# Patient Record
Sex: FEMALE | Race: WHITE | NOT HISPANIC OR LATINO | Employment: UNEMPLOYED | URBAN - METROPOLITAN AREA
[De-identification: names, ages, dates, MRNs, and addresses within clinical notes are randomized per-mention and may not be internally consistent; named-entity substitution may affect disease eponyms.]

---

## 2017-01-05 ENCOUNTER — TRANSFERRED RECORDS (OUTPATIENT)
Dept: HEALTH INFORMATION MANAGEMENT | Facility: CLINIC | Age: 32
End: 2017-01-05

## 2017-01-06 ENCOUNTER — TRANSFERRED RECORDS (OUTPATIENT)
Dept: HEALTH INFORMATION MANAGEMENT | Facility: CLINIC | Age: 32
End: 2017-01-06

## 2017-01-06 ENCOUNTER — APPOINTMENT (OUTPATIENT)
Dept: CT IMAGING | Facility: CLINIC | Age: 32
End: 2017-01-06
Attending: EMERGENCY MEDICINE
Payer: COMMERCIAL

## 2017-01-06 ENCOUNTER — HOSPITAL ENCOUNTER (OUTPATIENT)
Facility: CLINIC | Age: 32
Setting detail: OBSERVATION
Discharge: HOME | End: 2017-01-07
Attending: EMERGENCY MEDICINE | Admitting: INTERNAL MEDICINE
Payer: COMMERCIAL

## 2017-01-06 DIAGNOSIS — R10.12 ABDOMINAL PAIN, LEFT UPPER QUADRANT: Primary | ICD-10-CM

## 2017-01-06 DIAGNOSIS — R11.2 INTRACTABLE VOMITING WITH NAUSEA, UNSPECIFIED VOMITING TYPE: ICD-10-CM

## 2017-01-06 LAB
ALBUMIN SERPL-MCNC: 4 G/DL (ref 3.4–5)
ALP SERPL-CCNC: 46 U/L (ref 40–150)
ALT SERPL W P-5'-P-CCNC: 23 U/L (ref 0–50)
ANION GAP SERPL CALCULATED.3IONS-SCNC: 10 MMOL/L (ref 3–14)
AST SERPL W P-5'-P-CCNC: 20 U/L (ref 0–45)
BASOPHILS # BLD AUTO: 0.1 10E9/L (ref 0–0.2)
BASOPHILS NFR BLD AUTO: 0.4 %
BILIRUB DIRECT SERPL-MCNC: <0.1 MG/DL (ref 0–0.2)
BILIRUB SERPL-MCNC: 0.3 MG/DL (ref 0.2–1.3)
BUN SERPL-MCNC: 10 MG/DL (ref 7–30)
CALCIUM SERPL-MCNC: 8.8 MG/DL (ref 8.5–10.1)
CHLORIDE SERPL-SCNC: 109 MMOL/L (ref 94–109)
CO2 SERPL-SCNC: 23 MMOL/L (ref 20–32)
CREAT SERPL-MCNC: 0.75 MG/DL (ref 0.52–1.04)
DIFFERENTIAL METHOD BLD: ABNORMAL
EOSINOPHIL # BLD AUTO: 0 10E9/L (ref 0–0.7)
EOSINOPHIL NFR BLD AUTO: 0.3 %
ERYTHROCYTE [DISTWIDTH] IN BLOOD BY AUTOMATED COUNT: 12.4 % (ref 10–15)
GFR SERPL CREATININE-BSD FRML MDRD: ABNORMAL ML/MIN/1.7M2
GLUCOSE SERPL-MCNC: 196 MG/DL (ref 70–99)
HCG SERPL QL: NEGATIVE
HCT VFR BLD AUTO: 40.3 % (ref 35–47)
HGB BLD-MCNC: 13.9 G/DL (ref 11.7–15.7)
IMM GRANULOCYTES # BLD: 0 10E9/L (ref 0–0.4)
IMM GRANULOCYTES NFR BLD: 0.2 %
LIPASE SERPL-CCNC: 105 U/L (ref 73–393)
LYMPHOCYTES # BLD AUTO: 1.6 10E9/L (ref 0.8–5.3)
LYMPHOCYTES NFR BLD AUTO: 12.2 %
MCH RBC QN AUTO: 30.9 PG (ref 26.5–33)
MCHC RBC AUTO-ENTMCNC: 34.5 G/DL (ref 31.5–36.5)
MCV RBC AUTO: 90 FL (ref 78–100)
MONOCYTES # BLD AUTO: 0.7 10E9/L (ref 0–1.3)
MONOCYTES NFR BLD AUTO: 5.1 %
NEUTROPHILS # BLD AUTO: 10.7 10E9/L (ref 1.6–8.3)
NEUTROPHILS NFR BLD AUTO: 81.8 %
NRBC # BLD AUTO: 0 10*3/UL
NRBC BLD AUTO-RTO: 0 /100
PLATELET # BLD AUTO: 182 10E9/L (ref 150–450)
POTASSIUM SERPL-SCNC: 3.8 MMOL/L (ref 3.4–5.3)
PROT SERPL-MCNC: 7.2 G/DL (ref 6.8–8.8)
RBC # BLD AUTO: 4.5 10E12/L (ref 3.8–5.2)
SODIUM SERPL-SCNC: 142 MMOL/L (ref 133–144)
WBC # BLD AUTO: 13.1 10E9/L (ref 4–11)

## 2017-01-06 PROCEDURE — 25000128 H RX IP 250 OP 636: Performed by: INTERNAL MEDICINE

## 2017-01-06 PROCEDURE — 96361 HYDRATE IV INFUSION ADD-ON: CPT

## 2017-01-06 PROCEDURE — 99285 EMERGENCY DEPT VISIT HI MDM: CPT | Mod: 25

## 2017-01-06 PROCEDURE — 25500064 ZZH RX 255 OP 636: Performed by: EMERGENCY MEDICINE

## 2017-01-06 PROCEDURE — 25800025 ZZH RX 258: Performed by: EMERGENCY MEDICINE

## 2017-01-06 PROCEDURE — 96365 THER/PROPH/DIAG IV INF INIT: CPT | Mod: 59

## 2017-01-06 PROCEDURE — 80048 BASIC METABOLIC PNL TOTAL CA: CPT | Performed by: EMERGENCY MEDICINE

## 2017-01-06 PROCEDURE — 85025 COMPLETE CBC W/AUTO DIFF WBC: CPT | Performed by: EMERGENCY MEDICINE

## 2017-01-06 PROCEDURE — 99220 ZZC INITIAL OBSERVATION CARE,LEVL III: CPT | Performed by: INTERNAL MEDICINE

## 2017-01-06 PROCEDURE — 25000125 ZZHC RX 250

## 2017-01-06 PROCEDURE — 25000125 ZZHC RX 250: Performed by: EMERGENCY MEDICINE

## 2017-01-06 PROCEDURE — G0378 HOSPITAL OBSERVATION PER HR: HCPCS

## 2017-01-06 PROCEDURE — 83690 ASSAY OF LIPASE: CPT | Performed by: EMERGENCY MEDICINE

## 2017-01-06 PROCEDURE — 96375 TX/PRO/DX INJ NEW DRUG ADDON: CPT

## 2017-01-06 PROCEDURE — 84703 CHORIONIC GONADOTROPIN ASSAY: CPT | Performed by: INTERNAL MEDICINE

## 2017-01-06 PROCEDURE — 71260 CT THORAX DX C+: CPT

## 2017-01-06 PROCEDURE — 96376 TX/PRO/DX INJ SAME DRUG ADON: CPT

## 2017-01-06 PROCEDURE — 96366 THER/PROPH/DIAG IV INF ADDON: CPT

## 2017-01-06 PROCEDURE — 74177 CT ABD & PELVIS W/CONTRAST: CPT

## 2017-01-06 PROCEDURE — 25000125 ZZHC RX 250: Performed by: INTERNAL MEDICINE

## 2017-01-06 PROCEDURE — 80076 HEPATIC FUNCTION PANEL: CPT | Performed by: EMERGENCY MEDICINE

## 2017-01-06 RX ORDER — SODIUM CHLORIDE 9 MG/ML
INJECTION, SOLUTION INTRAVENOUS CONTINUOUS
Status: DISCONTINUED | OUTPATIENT
Start: 2017-01-06 | End: 2017-01-06

## 2017-01-06 RX ORDER — DEXTROSE MONOHYDRATE, SODIUM CHLORIDE, AND POTASSIUM CHLORIDE 50; 1.49; 4.5 G/1000ML; G/1000ML; G/1000ML
INJECTION, SOLUTION INTRAVENOUS ONCE
Status: COMPLETED | OUTPATIENT
Start: 2017-01-06 | End: 2017-01-07

## 2017-01-06 RX ORDER — METOCLOPRAMIDE HYDROCHLORIDE 5 MG/ML
5 INJECTION INTRAMUSCULAR; INTRAVENOUS ONCE
Status: COMPLETED | OUTPATIENT
Start: 2017-01-06 | End: 2017-01-06

## 2017-01-06 RX ORDER — ONDANSETRON 4 MG/1
4 TABLET, ORALLY DISINTEGRATING ORAL EVERY 6 HOURS PRN
Status: DISCONTINUED | OUTPATIENT
Start: 2017-01-06 | End: 2017-01-07 | Stop reason: HOSPADM

## 2017-01-06 RX ORDER — ACETAMINOPHEN 325 MG/1
650 TABLET ORAL EVERY 4 HOURS PRN
Status: DISCONTINUED | OUTPATIENT
Start: 2017-01-06 | End: 2017-01-07 | Stop reason: HOSPADM

## 2017-01-06 RX ORDER — LORAZEPAM 2 MG/ML
INJECTION INTRAMUSCULAR
Status: COMPLETED
Start: 2017-01-06 | End: 2017-01-06

## 2017-01-06 RX ORDER — ALUMINA, MAGNESIA, AND SIMETHICONE 2400; 2400; 240 MG/30ML; MG/30ML; MG/30ML
30 SUSPENSION ORAL ONCE
Status: DISCONTINUED | OUTPATIENT
Start: 2017-01-06 | End: 2017-01-07 | Stop reason: HOSPADM

## 2017-01-06 RX ORDER — PROCHLORPERAZINE MALEATE 5 MG
5-10 TABLET ORAL EVERY 6 HOURS PRN
Status: DISCONTINUED | OUTPATIENT
Start: 2017-01-06 | End: 2017-01-07 | Stop reason: HOSPADM

## 2017-01-06 RX ORDER — NALOXONE HYDROCHLORIDE 0.4 MG/ML
.1-.4 INJECTION, SOLUTION INTRAMUSCULAR; INTRAVENOUS; SUBCUTANEOUS
Status: DISCONTINUED | OUTPATIENT
Start: 2017-01-06 | End: 2017-01-07 | Stop reason: HOSPADM

## 2017-01-06 RX ORDER — ONDANSETRON 2 MG/ML
4 INJECTION INTRAMUSCULAR; INTRAVENOUS EVERY 6 HOURS PRN
Status: DISCONTINUED | OUTPATIENT
Start: 2017-01-06 | End: 2017-01-07 | Stop reason: HOSPADM

## 2017-01-06 RX ORDER — PROCHLORPERAZINE 25 MG
25 SUPPOSITORY, RECTAL RECTAL EVERY 12 HOURS PRN
Status: DISCONTINUED | OUTPATIENT
Start: 2017-01-06 | End: 2017-01-07 | Stop reason: HOSPADM

## 2017-01-06 RX ORDER — HYDROMORPHONE HYDROCHLORIDE 1 MG/ML
0.2 INJECTION, SOLUTION INTRAMUSCULAR; INTRAVENOUS; SUBCUTANEOUS
Status: DISCONTINUED | OUTPATIENT
Start: 2017-01-06 | End: 2017-01-07 | Stop reason: HOSPADM

## 2017-01-06 RX ORDER — IOPAMIDOL 755 MG/ML
76 INJECTION, SOLUTION INTRAVASCULAR ONCE
Status: COMPLETED | OUTPATIENT
Start: 2017-01-06 | End: 2017-01-06

## 2017-01-06 RX ADMIN — METOCLOPRAMIDE HYDROCHLORIDE 5 MG: 5 INJECTION INTRAMUSCULAR; INTRAVENOUS at 14:06

## 2017-01-06 RX ADMIN — LORAZEPAM 0.5 MG: 2 INJECTION INTRAMUSCULAR; INTRAVENOUS at 10:41

## 2017-01-06 RX ADMIN — POTASSIUM CHLORIDE, DEXTROSE MONOHYDRATE AND SODIUM CHLORIDE: 150; 5; 450 INJECTION, SOLUTION INTRAVENOUS at 14:12

## 2017-01-06 RX ADMIN — METOCLOPRAMIDE 5 MG: 5 INJECTION, SOLUTION INTRAMUSCULAR; INTRAVENOUS at 11:37

## 2017-01-06 RX ADMIN — ONDANSETRON HYDROCHLORIDE 4 MG: 2 SOLUTION INTRAMUSCULAR; INTRAVENOUS at 17:06

## 2017-01-06 RX ADMIN — PANTOPRAZOLE SODIUM 40 MG: 40 INJECTION, POWDER, FOR SOLUTION INTRAVENOUS at 20:32

## 2017-01-06 RX ADMIN — IOPAMIDOL 76 ML: 755 INJECTION, SOLUTION INTRAVENOUS at 12:35

## 2017-01-06 RX ADMIN — SODIUM CHLORIDE 86 ML: 9 INJECTION, SOLUTION INTRAVENOUS at 12:35

## 2017-01-06 RX ADMIN — SODIUM CHLORIDE 100 ML: 9 INJECTION, SOLUTION INTRAVENOUS at 17:06

## 2017-01-06 RX ADMIN — DEXTROSE AND SODIUM CHLORIDE: 5; 900 INJECTION, SOLUTION INTRAVENOUS at 10:50

## 2017-01-06 RX ADMIN — PROCHLORPERAZINE EDISYLATE 10 MG: 5 INJECTION INTRAMUSCULAR; INTRAVENOUS at 10:41

## 2017-01-06 ASSESSMENT — ENCOUNTER SYMPTOMS
ABDOMINAL PAIN: 1
DIARRHEA: 0
VOMITING: 1
SHORTNESS OF BREATH: 0
NAUSEA: 1
CHILLS: 0
FEVER: 0

## 2017-01-06 NOTE — ED NOTES
"[facility]  ED Nurse Handoff Report    ED Chief complaint: Abdominal Pain      ED Diagnosis:   Final diagnoses:   Intractable vomiting with nausea, unspecified vomiting type       Code Status: Full Code    Allergies:   Allergies   Allergen Reactions     Ceclor [Cefaclor] Hives       Activity level:  Independent     Needed?: No    Isolation: No  Infection: Not Applicable    Bariatric?: No      Vital Signs:   Filed Vitals:    01/06/17 0937   BP: 140/89   Temp: 98.5  F (36.9  C)   TempSrc: Temporal   Resp: 28   Height: 1.6 m (5' 3\")   Weight: 69.4 kg (153 lb)   SpO2: 99%       Cardiac Rhythm: ,        Pain level: 0-10 Pain Scale: 4    Is this patient confused?: No    Patient Report: Initial Complaint: n/v. abd pain  Focused Assessment: pt arrives via private car. Pt crying c/o lower abd pain and intermittent vomiting and retching. Pt had just come from having an upper gi with biopsy for abd pain. Pt had zofran 8 mgs prior to arrival here. pt treated here with compazine,reglan x 2 and iv fluids.Tests Performed:blood. ctTreatments provided: as stated normal ct scan. Glucose 196. Wbc 13.1  Family Comments:dad and son here    OBS brochure/video discussed/provided to patient: N/A    ED Medications:   Medications   dextrose 5% and 0.9% NaCl infusion ( Intravenous Stopped 1/6/17 1400)   alum & mag hydroxide-simethicone (MYLANTA ES/MAALOX  ES) suspension 30 mL (not administered)   prochlorperazine (COMPAZINE) 5 MG/ML injection (10 mg  Given 1/6/17 1041)   LORazepam (ATIVAN) 2 MG/ML injection (0.5 mg  Given 1/6/17 1041)   metoclopramide (REGLAN) injection 5 mg (5 mg Intravenous Given 1/6/17 1137)   iopamidol (ISOVUE-370) solution 76 mL (76 mLs Intravenous Given 1/6/17 1235)   sodium chloride 0.9 % for CT scan flush dose 86 mL (86 mLs Intravenous Given 1/6/17 1235)   metoclopramide (REGLAN) injection 5 mg (5 mg Intravenous Given 1/6/17 1406)   dextrose 5% and 0.45% NaCl + KCl 20 mEq/L infusion ( " Intravenous New Bag 1/6/17 1412)       Drips infusing?:  No      ED NURSE PHONE NUMBER: [phone]

## 2017-01-06 NOTE — CONSULTS
GASTROINTESTINAL CONSULTATION      PRIMARY CARE PHYSICIAN:  Susan Haase APRN, CNP      REASON FOR CONSULTATION:  Postprocedural abdominal pain, nausea and vomiting. .      HISTORY OF PRESENT ILLNESS:  Earlier today, Jonelle Barba underwent upper GI endoscopy at our [facility] her chronic abdominal pain.  The patient reports for the past year or so she has had left upper quadrant pain on an intermittent basis.  Nothing triggers the pain and nothing makes it better.  She has it about twice a month.      EGD today showed 1 focal area of esophagitis.  This was biopsied.  Gastric biopsies were taken for H. pylori.  There is a note that the stomach was shaped possible and paraesophageal hernia was present.  Duodenum was normal.      As soon as patient woke up, she began feeling nauseous and had nausea and vomiting.  Initially threw up bile with small red flecks in it.  She also had abdominal pain that would not resolve despite burping the air.  She presented to [facility] Emergency Room for evaluation.  Upon presentation to the emergency room, she had a CT chest, abdomen and pelvis.  CT scan was unremarkable.  Chest x-ray was normal.  Blood tests were only remarkable for a mild elevation in her white count and her glucose.      She continues to have some nausea and vomiting.  She has not vomited any more blood.  She continues to have diffuse abdominal pain.  She also had some diarrhea since presenting to the emergency room.      PAST MEDICAL HISTORY:  The patient reports having pneumonia last week.   History of  and cystoscopy.      FAMILY HISTORY:  No family history of colon cancer or colon polyps at an early age.      SOCIAL HISTORY:  The patient does use tobacco.      REVIEW OF SYSTEMS:  A 10-point review of systems was done and other than those mentioned was positive for dizziness.      PHYSICAL EXAMINATION:   VITAL SIGNS:  Temperature 98.5, pulse 92, respiratory rate 28, BP is 127/80, pulse  ox 98% on room air.   GENERAL:  The patient is awake, alert and oriented in no acute distress.   HEENT:  Normocephalic, atraumatic.  Pupils equal, round, reactive to light.  Extraocular eye muscles intact.  Sclerae anicteric.  Oropharynx is clear.  Mucous membranes are moist.   NECK:  Supple without lymphadenopathy.  There is no thyromegaly.   CHEST:  Clear to auscultation bilaterally and nontender to palpation.   ABDOMEN:  Soft, mildly diffusely tender without rebound or guarding.  Bowel sounds are normal.   EXTREMITIES:  Warm, without lower extremity edema.   NEUROLOGIC:  Cranial nerves II-XII grossly intact.   PSYCHIATRIC:  Reveals no obvious anxiety or depression.      ASSESSMENT:  A 31-year-old female with history of post-procedural nausea, vomiting, and abdominal pain.  CT has not shown any worrisome pathology.  Suspect that perhaps air insufflation of her abdomen this triggered her nausea and vomiting.  The blood that she initially vomited were likely due to the biopsies.      PLAN:   1.  Start PPIs.   2.  Start clear liquids and advance as tolerated.   3.  Expect the patient will be able to be discharged tomorrow.      Thank you for allowing me to participate in the care of this patient.  Please contact me with any questions or concerns.         IRON MILLER MD             D: 2017 15:19   T: 2017 17:51   MT: CHERRY      Name:     STARLA LÓPEZ   MRN:      [id number]        Account:       [id number]   :      1985           Consult Date:  2017      Document: [id number]       cc: Susan Haase APRN, CNP

## 2017-01-06 NOTE — ED AVS SNAPSHOT
MRN:[id number]                      After Visit Summary   1/6/2017    Jonelle Barba    MRN: [id number]           Thank you!     Thank you for choosing [facility] for your care. Our goal is always to provide you with excellent care. Hearing back from our patients is one way we can continue to improve our services. Please take a few minutes to complete the written survey that you may receive in the mail after you visit with us. Thank you!        Patient Information     Date Of Birth          1985        About your hospital stay     You were admitted on:  January 6, 2017 [facility]    You were discharged on:  January 7, 2017        Reason for your hospital stay       You were admitted due to severe nausea and vomiting after an upper GI procedure.  You were treated with supportive care and acid reducing medication with improvement.  You are encouraged to trial Omeprazole (Prilosec) for two weeks and monitor symptoms.  Followup as previously indicated with GI.                  Who to Call     For medical emergencies, please call 911.  For non-urgent questions about your medical care, please call your primary care provider or clinic, [phone]          Attending Provider     Provider    Lalo Rain MD Bhattarai, Nimesh, MD       Primary Care Provider Office Phone # Fax #    Susan Rachele Haase, APRN [phone]       [address] [address]        After Care Instructions     Activity       Your activity upon discharge: activity as tolerated            Diet       Follow this diet upon discharge:   Regular Diet Adult                  Follow-up Appointments     Follow-up and recommended labs and tests        Follow up with primary care provider, Susan Haase, within 1-2 weeks, to evaluate medication change and for hospital follow- up. No follow up labs or test are needed.    Follow  "up with Gastroenterology as previously recommended.                  Pending Results     No orders found for last 2 day(s).            Statement of Approval     Ordered          17 1058  I have reviewed and agree with all the recommendations and orders detailed in this document.   EFFECTIVE NOW     Approved and electronically signed by:  Angel Paredes PA-C             Admission Information        Provider Department Dept Phone    2017 Manolo Marley MD [facility] [phone]      Your Vitals Were     Blood Pressure Temperature Respirations    106/60 mmHg 98.9  F (37.2  C) (Oral) 16    Height Weight BMI (Body Mass Index)    1.6 m (5' 3\") 69.4 kg (153 lb) 27.11 kg/m2    Pulse Oximetry          96%        MyChart Information     YouScan lets you send messages to your doctor, view your test results, renew your prescriptions, schedule appointments and more. To sign up, go to [email or web address] . Click on \"Log in\" on the left side of the screen, which will take you to the Welcome page. Then click on \"Sign up Now\" on the right side of the page.     You will be asked to enter the access code listed below, as well as some personal information. Please follow the directions to create your username and password.     Your access code is: [email or web address]  Expires: 3/26/2017 11:52 AM     Your access code will  in 90 days. If you need help or a new code, please call your [facility] clinic or [phone].        Care EveryWhere ID     This is your Care EveryWhere ID. This could be used by other organizations to access your [facility] medical records  [id number]           Review of your medicines      START taking        Dose / Directions    omeprazole 20 MG CR capsule   Commonly known as:  priLOSEC   Used for:  Intractable vomiting with nausea, unspecified vomiting type        Dose:  20 mg   Take 1 capsule (20 mg) by mouth daily   Quantity:  90 capsule   Refills:  0         CONTINUE these medicines which have " NOT CHANGED        Dose / Directions    * albuterol 108 (90 BASE) MCG/ACT Inhaler   Commonly known as:  PROAIR HFA/PROVENTIL HFA/VENTOLIN HFA   Used for:  Mild intermittent asthma without complication        Dose:  2 puff   Inhale 2 puffs into the lungs every 6 hours as needed for shortness of breath / dyspnea or wheezing   Quantity:  1 Inhaler   Refills:  0       * albuterol (2.5 MG/3ML) 0.083% neb solution   Used for:  Cough        Dose:  1 vial   Take 1 vial (2.5 mg) by nebulization every 6 hours as needed for shortness of breath / dyspnea or wheezing   Quantity:  75 mL   Refills:  0       nicotine polacrilex 2 MG gum   Commonly known as:  NICORELIEF   Used for:  Cigarette nicotine dependence without complication        Dose:  2 mg   Place 1 each (2 mg) inside cheek as needed for smoking cessation   Quantity:  90 tablet   Refills:  1       sertraline 100 MG tablet   Commonly known as:  ZOLOFT   Used for:  Anxiety   Notes to Patient:  Resume taking as you do at home        Dose:  100 mg   Take 1 tablet (100 mg) by mouth daily   Quantity:  30 tablet   Refills:  1       * Notice:  This list has 2 medication(s) that are the same as other medications prescribed for you. Read the directions carefully, and ask your doctor or other care provider to review them with you.         Where to get your medicines      Some of these will need a paper prescription and others can be bought over the counter. Ask your nurse if you have questions.     You don't need a prescription for these medications    - omeprazole 20 MG CR capsule             Protect others around you: Learn how to safely use, store and throw away your medicines at www.disposemymeds.org.             Medication List: This is a list of all your medications and when to take them. Check marks below indicate your daily home schedule. Keep this list as a reference.      Medications           Morning Afternoon Evening Bedtime As Needed    * albuterol 108 (90 BASE)  MCG/ACT Inhaler   Commonly known as:  PROAIR HFA/PROVENTIL HFA/VENTOLIN HFA   Inhale 2 puffs into the lungs every 6 hours as needed for shortness of breath / dyspnea or wheezing                                   * albuterol (2.5 MG/3ML) 0.083% neb solution   Take 1 vial (2.5 mg) by nebulization every 6 hours as needed for shortness of breath / dyspnea or wheezing                                   nicotine polacrilex 2 MG gum   Commonly known as:  NICORELIEF   Place 1 each (2 mg) inside cheek as needed for smoking cessation                                   omeprazole 20 MG CR capsule   Commonly known as:  priLOSEC   Take 1 capsule (20 mg) by mouth daily                                sertraline 100 MG tablet   Commonly known as:  ZOLOFT   Take 1 tablet (100 mg) by mouth daily   Notes to Patient:  Resume taking as you do at home                                * Notice:  This list has 2 medication(s) that are the same as other medications prescribed for you. Read the directions carefully, and ask your doctor or other care provider to review them with you.

## 2017-01-06 NOTE — ED AVS SNAPSHOT
[facility] Observation Unit    [address] [address]    Phone:  [phone]                                       Jonelle Barba   MRN: [id number]    Department:  [facility]   Date of Visit:  1/6/2017           After Visit Summary Signature Page     I have received my discharge instructions, and my questions have been answered. I have discussed any challenges I see with this plan with the nurse or doctor.    ..........................................................................................................................................  Patient/Patient Representative Signature      ..........................................................................................................................................  Patient Representative Print Name and Relationship to Patient    ..................................................               ................................................  Date                                            Time    ..........................................................................................................................................  Reviewed by Signature/Title    ...................................................              ..............................................  Date                                                            Time

## 2017-01-06 NOTE — PHARMACY-ADMISSION MEDICATION HISTORY
Admission medication history interview status for the 1/6/2017  admission is complete. See EPIC admission navigator for prior to admission medications     Medication history source reliability:Good    Actions taken by pharmacist (provider contacted, etc):None     Additional medication history information not noted on PTA med list :None    Medication reconciliation/reorder completed by provider prior to medication history? No    Time spent in this activity: 10 minutes    Prior to Admission medications    Medication Sig Last Dose Taking? Auth Provider   albuterol (2.5 MG/3ML) 0.083% neb solution Take 1 vial (2.5 mg) by nebulization every 6 hours as needed for shortness of breath / dyspnea or wheezing prn Yes Aaseby-Aguilera, Ramona Ann, PA-C   nicotine polacrilex (NICORELIEF) 2 MG gum Place 1 each (2 mg) inside cheek as needed for smoking cessation prn Yes Haase, Susan Rachele, APRN CNP   sertraline (ZOLOFT) 100 MG tablet Take 1 tablet (100 mg) by mouth daily 1/5/2017 at evening Yes Haase, Susan Rachele, APRN CNP   albuterol (PROAIR HFA, PROVENTIL HFA, VENTOLIN HFA) 108 (90 BASE) MCG/ACT inhaler Inhale 2 puffs into the lungs every 6 hours as needed for shortness of breath / dyspnea or wheezing prn Yes Haase, Susan Rachele, APRN CNP Tiffany M. Reinitz, PharmD

## 2017-01-06 NOTE — ED PROVIDER NOTES
History     Chief Complaint:  Abdominal Pain    HPI   Jonelle Barba is a 31 year old female who presents immediately after an endoscopy with an acute onset of suprapubic abdominal pain with associated nausea and vomiting. Per the patient's discharge note, today the patient had an endoscopy performed for evaluation of non improving left upper quadrant abdominal she had prior to surgery. During the procedure, she received 200 mg of propofol IV, 8 mg Zofran and 500 ccs of saline. Findings of the endoscopy showed a possible paraesophageal hernia and mild esophagitis. Biopsy of the stomach and distal esophagus was obtained. During the procedure, the patient had a lot of retching and coughing. Following the procedure the patient vomited purple flecks and Dr. Jason, of [facility] who preformed the procedure, became concerned about a possible perforation and that the patient may have a small jennifer wise tear. The patient was subsequently advised to come to the emergency department for further evaluation. The patient does not report any fevers, chills, chest pain, shortness of breath, diarrhea, or other related symptoms. She voices no other concerns at this time.     Allergies:  Ceclor      Medications:    Albuterol   Zithromax   Nicrorelief   Zoloft   Proair       Past Medical History:    Anxiety  Mild major depression  Bipolar 2 disorder     Past Surgical History:    Chalazion removal    section   Cholecystectomy     Family History:    Heart disease (paternal grandfather)  Cancer (maternal grandmother)  Diabetes, substance abuse (paternal aunt)   GI disease (father)    Social History:  The patient smokes lightly. The patient does not use alcohol.   Marital Status:  Single     Review of Systems   Constitutional: Negative for fever and chills.   Respiratory: Negative for shortness of breath.    Cardiovascular: Negative for chest pain.   Gastrointestinal: Positive for nausea, vomiting and abdominal pain.  "Negative for diarrhea.   All other systems reviewed and are negative.      Physical Exam     Patient Vitals for the past 24 hrs:   BP Temp Temp src Heart Rate Resp SpO2 Height Weight   01/06/17 0937 140/89 mmHg 98.5  F (36.9  C) Temporal 92 28 99 % 1.6 m (5' 3\") 69.4 kg (153 lb)      Physical Exam  General: Resting comfortably on the gurney  Head:  The scalp, face, and head appear normal  Eyes:  The pupils are equal, round, and reactive to light    There is no nystagmus    Extraocular muscles are intact    Conjunctivae and sclerae are normal  ENT:    The nose is normal    Pinnae are normal    The oropharynx is normal    Uvula is in the midline  Neck:  Normal range of motion    There is no rigidity noted    There is no midline cervical spine pain/tenderness    Trachea is in the midline    No mass is detected  CV:  Regular rate and underlying rhythm     Normal S1/S2, no S3/S4    No pathological murmur detected  Resp:  Lungs are clear    There is no tachypnea    Non-labored    No rales    No wheezing   GI:  Abdomen is soft, there is no rigidity    No distension    No tympani    No rebound tenderness     Non-surgical without peritoneal features  MS:  Normal muscular tone    Symmetric motor strength    No major joint effusions    No asymmetric leg swelling, no calf tenderness  Skin:  No rash or acute skin lesions noted  Neuro: Speech is normal and fluent  Psych:  Awake. Alert.      Normal affect.  Appropriate interactions.  Lymph: No anterior cervical lymphadenopathy noted    Emergency Department Course     Imaging:  CT Chest/Abdomen/Pelvis w contrast: 1. No evidence for pulmonary embolus. 2. Prominent hilar lymph nodes bilaterally. 3. No evidence for bowel perforation in the chest, abdomen, or pelvis. 4. Fluid in the distal small bowel which can be seen with gastroenteritis. 5. Probable right ovarian cyst. Readings per radiology.     Laboratory:  CBC: WNL (HGB 13.9, ), WBC 13.1 (H), otherwise within normal limits "   BMP: WNL (Creatinine 0.75), glucose 196 (H)  Hepatic Function Panel: all within normal limits   Lipase: 105 (WNL)    Interventions:  1134 Reglan 5 mg IV  1044 Dextrose 1/2 NS  1041 Ativan 2 mg/mL IV (o.5 mg IV)  1038 Compazine 5 mg/mL (10 mg IV)  Reglan 5 mg IV      Emergency Department Course:  Past medical records, nursing notes, and vitals reviewed. I performed an exam of the patient and obtained history, as documented above. Blood was obtained. A peripheral IV was established.     1400: on re-evaluation the patient has ongoing retching, nausea, and vomiting. She continues to not feel well despite numerous antiemetics     Findings and plan explained to the Patient who consents to admission. Discussed the patient with Dr. Alex, who will admit the patient to a obs bed for further monitoring, evaluation, and treatment.    Impression & Plan      Medical Decision Making:  Jonelle Barba is a 31 year old female who presents with sweating, nausea and intractable vomiting after upper endoscopy. The patient was sent over for CT scan imaging to exclude perforation and no perforation is seen. The patient has been in the emergency department for approximately four hours and has ongoing retching, nausea, and vomiting. She continues to have some mild diaphoresis as well. No life threatening pathology is seen on CT imaging. She has been given IV hydration, Zofran, compazine, Reglan, Lorazepam and continues to feel ill. She will be admitted for observation, hydration, and addition antiemetics. This appears to be all post procedural related. Interestingly, the only sedative she was given was Propofol which does have some intrinsic antiemetic effect. The patient will be placed on observation with Dr. Alex with [facility]     Diagnosis:    ICD-10-CM    1. Intractable vomiting with nausea, unspecified vomiting type post EGD R11.2         Disposition:  Placed on obs with Dr. Alex with [facility]     I, Maddie Glover, am serving as a scribe at 10:18 AM on 1/6/2017 to document services personally performed by Lalo Rain MD based on my observations and the provider's statements to me.            Lalo Rain MD  01/06/17 1734

## 2017-01-07 VITALS
HEIGHT: 63 IN | RESPIRATION RATE: 16 BRPM | TEMPERATURE: 98.9 F | OXYGEN SATURATION: 96 % | BODY MASS INDEX: 27.11 KG/M2 | DIASTOLIC BLOOD PRESSURE: 60 MMHG | WEIGHT: 153 LBS | SYSTOLIC BLOOD PRESSURE: 106 MMHG

## 2017-01-07 LAB
ANION GAP SERPL CALCULATED.3IONS-SCNC: 8 MMOL/L (ref 3–14)
BUN SERPL-MCNC: 7 MG/DL (ref 7–30)
CALCIUM SERPL-MCNC: 8.6 MG/DL (ref 8.5–10.1)
CHLORIDE SERPL-SCNC: 110 MMOL/L (ref 94–109)
CO2 SERPL-SCNC: 24 MMOL/L (ref 20–32)
CREAT SERPL-MCNC: 0.59 MG/DL (ref 0.52–1.04)
ERYTHROCYTE [DISTWIDTH] IN BLOOD BY AUTOMATED COUNT: 12.9 % (ref 10–15)
GFR SERPL CREATININE-BSD FRML MDRD: ABNORMAL ML/MIN/1.7M2
GLUCOSE SERPL-MCNC: 89 MG/DL (ref 70–99)
HCT VFR BLD AUTO: 38.4 % (ref 35–47)
HGB BLD-MCNC: 12.9 G/DL (ref 11.7–15.7)
MCH RBC QN AUTO: 30.5 PG (ref 26.5–33)
MCHC RBC AUTO-ENTMCNC: 33.6 G/DL (ref 31.5–36.5)
MCV RBC AUTO: 91 FL (ref 78–100)
PLATELET # BLD AUTO: 175 10E9/L (ref 150–450)
POTASSIUM SERPL-SCNC: 3.5 MMOL/L (ref 3.4–5.3)
RBC # BLD AUTO: 4.23 10E12/L (ref 3.8–5.2)
SODIUM SERPL-SCNC: 142 MMOL/L (ref 133–144)
WBC # BLD AUTO: 9.7 10E9/L (ref 4–11)

## 2017-01-07 PROCEDURE — 96361 HYDRATE IV INFUSION ADD-ON: CPT

## 2017-01-07 PROCEDURE — 25000125 ZZHC RX 250: Performed by: INTERNAL MEDICINE

## 2017-01-07 PROCEDURE — 80048 BASIC METABOLIC PNL TOTAL CA: CPT | Performed by: INTERNAL MEDICINE

## 2017-01-07 PROCEDURE — G0378 HOSPITAL OBSERVATION PER HR: HCPCS

## 2017-01-07 PROCEDURE — 96376 TX/PRO/DX INJ SAME DRUG ADON: CPT

## 2017-01-07 PROCEDURE — 36415 COLL VENOUS BLD VENIPUNCTURE: CPT | Performed by: INTERNAL MEDICINE

## 2017-01-07 PROCEDURE — 85027 COMPLETE CBC AUTOMATED: CPT | Performed by: INTERNAL MEDICINE

## 2017-01-07 PROCEDURE — 99217 ZZC OBSERVATION CARE DISCHARGE: CPT | Performed by: PHYSICIAN ASSISTANT

## 2017-01-07 RX ADMIN — PANTOPRAZOLE SODIUM 40 MG: 40 INJECTION, POWDER, FOR SOLUTION INTRAVENOUS at 09:35

## 2017-01-07 NOTE — PLAN OF CARE
Problem: Goal Outcome Summary  Goal: Goal Outcome Summary  Outcome: Improving  pT DENIES NAUSEA. NO VOMITING SINCE ADMISSION TO FLOOR. NO DIARRHEA WANTING TO DRINK FLUIDS GIVEN.

## 2017-01-07 NOTE — PROGRESS NOTES
Goals to be met before discharge    1. Improve n/v- Partially met    2. Able to tolerate PO- Partially met

## 2017-01-07 NOTE — PROGRESS NOTES
Goals to be met before discharge    1. Improve n/v-partially met. Denies currently  2. able to tolerate PO-partially met. Tolerating clears

## 2017-01-07 NOTE — PLAN OF CARE
Problem: Discharge Planning  Goal: Discharge Planning (Adult, OB, Behavioral, Peds)  Outcome: Adequate for Discharge Date Met:  01/07/17  A&Ox4. Up independently. Tolerating regular diet, denies nausea. VSS on RA. Infrequent dry cough and throat irritation from EGD yesterday. Denies pain. Mother and son at bedside.    Pt given discharge instructions. Questions answered. Discharge medications reviewed with patient- to be picked up OTC. Follow up appointment to be scheduled by pt. Pt to DC home with mother.

## 2017-01-07 NOTE — DISCHARGE SUMMARY
[facility]    Discharge Summary  Hospitalist    Date of Admission:  1/6/2017  Date of Discharge:  1/7/2017  Discharging Provider: Angel Paredes PA-C  Date of Service (when I saw the patient): 01/07/2017    Discharge Diagnoses  Intractable vomiting with nausea, unspecified vomiting type    History of Present Illness  Jonelle Barba is an 31 year old female who presented with intractable nausea, vomiting and abdominal pain following an outpatient EGD performed same-day.      For complete details of admission, refer to H&P performed by Dr. Marley.    Hospital Course  Jonelle Barba was admitted on 1/6/2017.  She underwent outpatient EGD at [facility] due to ongoing abdominal pain.  EGD with findings of paraesophageal hernia and mild esophagitis.  Immediately following procedure, patient with intractable retching, coughing, and severe abdominal pain.  She was referred to the Emergency Department for evaluation, where a CT Chest/Abdomen/Pelvis was obtained and negative for perforation or other acute finding.  She was admitted under observation, provided IV pain control and antiemetics with subsequent improvement.  She was able to advance to a regular diet and discharged home with recommendations to start a PPI daily until follow-up in clinic with GI as previously indicated.  She was discharged home in stable condition with adequate pain control using tylenol and tolerating regular diet.    Angel Paredes PA-C    Significant Results and Procedures  As noted below    Pending Results  These results will be followed up by n/a   Unresulted Labs Ordered in the Past 30 Days of this Admission     No orders found for last 60 day(s).          Code Status  Full Code       Primary Care Physician  Susan Haase    Physical Exam  Temp: 98.9  F (37.2  C) Temp src: Oral BP: 106/60 mmHg   Heart Rate: 77 Resp: 16 SpO2: 96 % O2 Device: None (Room air)    Filed Vitals:    01/06/17 0937   Weight: 69.4 kg (153 lb)      Vital Signs with Ranges  Temp:  [98.5  F (36.9  C)-98.9  F (37.2  C)] 98.9  F (37.2  C)  Heart Rate:  [70-82] 77  Resp:  [16] 16  BP: (106-152)/(58-92) 106/60 mmHg  SpO2:  [96 %-99 %] 96 %  I/O last 3 completed shifts:  In: 1590 [P.O.:120; I.V.:1470]  Out: 300 [Urine:300]    GEN: well-developed, well-nourished, appears comfortable  PULM: lungs CTA bilaterally, no increased work of breathing, no wheeze, rales, rhonchi  CV: RRR, S1 & S2, no murmur  GI: soft, nontender, nondistended, no guarding or rigidity, +BS in all 4 quadrants  SKIN: warm & dry without rash, wound, or pedal edema, no bruising or bleeding    Discharge Disposition  Discharged to home  Condition at discharge: Stable    Consultations This Hospital Stay  GASTROENTEROLOGY IP CONSULT    Time Spent on This Encounter  IAngel, personally saw the patient today and spent less than or equal to 30 minutes discharging this patient.    Discharge Orders    Reason for your hospital stay   You were admitted due to severe nausea and vomiting after an upper GI procedure.  You were treated with supportive care and acid reducing medication with improvement.  You are encouraged to trial Omeprazole (Prilosec) for two weeks and monitor symptoms.  Followup as previously indicated with GI.     Follow-up and recommended labs and tests    Follow up with primary care provider, Susan Haase, within 1-2 weeks, to evaluate medication change and for hospital follow- up. No follow up labs or test are needed.    Follow up with Gastroenterology as previously recommended.     Activity   Your activity upon discharge: activity as tolerated     Diet   Follow this diet upon discharge:   Regular Diet Adult       Discharge Medications  Current Discharge Medication List      START taking these medications    Details   omeprazole (PRILOSEC) 20 MG CR capsule Take 1 capsule (20 mg) by mouth daily  Qty: 90 capsule, Refills: 0    Associated Diagnoses: Intractable vomiting with nausea,  unspecified vomiting type         CONTINUE these medications which have NOT CHANGED    Details   albuterol (2.5 MG/3ML) 0.083% neb solution Take 1 vial (2.5 mg) by nebulization every 6 hours as needed for shortness of breath / dyspnea or wheezing  Qty: 75 mL, Refills: 0    Associated Diagnoses: Cough      nicotine polacrilex (NICORELIEF) 2 MG gum Place 1 each (2 mg) inside cheek as needed for smoking cessation  Qty: 90 tablet, Refills: 1    Associated Diagnoses: Cigarette nicotine dependence without complication      sertraline (ZOLOFT) 100 MG tablet Take 1 tablet (100 mg) by mouth daily  Qty: 30 tablet, Refills: 1    Associated Diagnoses: Anxiety      albuterol (PROAIR HFA, PROVENTIL HFA, VENTOLIN HFA) 108 (90 BASE) MCG/ACT inhaler Inhale 2 puffs into the lungs every 6 hours as needed for shortness of breath / dyspnea or wheezing  Qty: 1 Inhaler, Refills: 0    Comments: Patient will call when needed.  Associated Diagnoses: Mild intermittent asthma without complication           Allergies  Allergies   Allergen Reactions     Ceclor [Cefaclor] Hives     Data  Most Recent 3 CBC's:  Recent Labs   Lab Test  01/07/17   0620  01/06/17   1100  04/27/16   0840   WBC  9.7  13.1*  6.6   HGB  12.9  13.9  13.9   MCV  91  90  93   PLT  175  182  143*      Most Recent 3 BMP's:  Recent Labs   Lab Test  01/07/17   0620  01/06/17   1100  11/11/15   1626   NA  142  142  137   POTASSIUM  3.5  3.8  4.0   CHLORIDE  110*  109  105   CO2  24  23  26   BUN  7  10  9   CR  0.59  0.75  0.72   ANIONGAP  8  10  6   AASHISH  8.6  8.8  9.5   GLC  89  196*  92     Most Recent 2 LFT's:  Recent Labs   Lab Test  01/06/17   1100  09/14/15   1310   AST  20  19   ALT  23  28   ALKPHOS  46  51   BILITOTAL  0.3  0.3     Most Recent INR's and Anticoagulation Dosing History:        Anticoagulation Dose History     There is no flowsheet data to display.        Most Recent 3 Troponin's:  Recent Labs   Lab Test  09/14/15   1310   TROPI  <0.015  The 99th percentile  for upper reference range is 0.045 ug/L.  Troponin values in   the range of 0.045 - 0.120 ug/L may be associated with risks of adverse   clinical events.       Most Recent Cholesterol Panel:  Recent Labs   Lab Test  07/20/10   1022   CHOL  127   LDL  73   HDL  34*   TRIG  100     Most Recent 6 Bacteria Isolates From Any Culture (See EPIC Reports for Culture Details):  Recent Labs   Lab Test  08/18/16   1811  04/27/16   0839  10/22/15   1545  07/06/15   1443  08/07/13   1655  03/30/10   1910   CULT  No Beta Streptococcus isolated  No Beta Streptococcus isolated  No Beta Streptococcus isolated  No Beta Streptococcus isolated  No growth  No growth after 6 days     Most Recent TSH, T4 and A1c Labs:  Recent Labs   Lab Test  03/20/15   1535   TSH  2.55     Results for orders placed or performed during the hospital encounter of 01/06/17   CT Chest/Abdomen/Pelvis w Contrast    Narrative    CT CHEST/ABDOMEN/PELVIS WITH CONTRAST 1/6/2017 12:50 PM    HISTORY: Post endoscopy. Bleeding. Intractable vomiting. Rule out  perforation.    TECHNIQUE: Scans were obtained from the lung apices through the pelvis  with IV contrast. Radiation dose for this scan was reduced using  automated exposure control, adjustment of the mA and/or kV according  to patient size, or iterative reconstruction technique.    CONTRAST GIVEN: 76mL Isovue-370.    COMPARISON: None.    FINDINGS:  Chest: Pulmonary arteries are well-opacified and appear patent without  evidence of pulmonary embolus. Thoracic aorta is normal without  evidence of aneurysm or dissection. Mild gaseous distention of the  esophagus. No extraluminal air or hemorrhage identified in the  posterior mediastinum. Several mildly prominent lymph nodes identified  in both pete slightly more marked on the right. No definite  mediastinal adenopathy. Lungs are clear.    Abdomen and pelvis: Liver, spleen, pancreas, and both kidneys are  normal. Previous cholecystectomy. Moderate amount of fluid and  air in  the stomach. No evidence for pneumoperitoneum. 2.8 x 2.5 cm cyst in  the right adnexa which most likely represents an ovarian cyst. Fluid  in the distal small bowel which can be seen with gastroenteritis.  Colon and small bowel are otherwise unremarkable without evidence of  obstruction. Appendix is normal. No abdominal or pelvic adenopathy.  Remainder of the scan is negative.      Impression    IMPRESSION:   1. No evidence for pulmonary embolus.  2. Prominent hilar lymph nodes bilaterally.  3. No evidence for bowel perforation in the chest, abdomen, or pelvis.  4. Fluid in the distal small bowel which can be seen with  gastroenteritis.  5. Probable right ovarian cyst.    RONNELL GARNER MD

## 2017-01-07 NOTE — PROGRESS NOTES
ASSISTED TO BED. ORIENTED TO ROOM. STATES  PAIN TOLERABLE, BUT HAVING NAUSEA.  ZOFRAN GIVEN WITH RELIEF.

## 2017-01-07 NOTE — PLAN OF CARE
Problem: Goal Outcome Summary  Goal: Goal Outcome Summary  Outcome: Improving  A&Ox4. VSS on RA. Up independently. C/O 2/10 abdominal pain that she has had since admission. No emesis or diarrhea this shift. Tolerating clear liquid diets, will attempt to advance further this AM. GI consult done.

## 2017-01-07 NOTE — PROGRESS NOTES
"[facility]  Gastroenterology Progress note      Assessment        abdominal pain after EGD improved this am.  Discomfort back to mild LUQ.  CT scan of chest abd pelvis negative for PE or perforation. Physical exam benign abdomen      Plan     discharge home      Interval History     improved overnight      Physical Exam    /60 mmHg  Temp(Src) 98.9  F (37.2  C) (Oral)  Resp 16  Ht 1.6 m (5' 3\")  Wt 69.4 kg (153 lb)  BMI 27.11 kg/m2  SpO2 96%  Temp (24hrs), Av.7  F (37.1  C), Min:98.5  F (36.9  C), Max:98.9  F (37.2  C)    Patient Vitals for the past 72 hrs:   Weight   17 0937 69.4 kg (153 lb)       Intake/Output Summary (Last 24 hours) at 17 0912  Last data filed at 17 0428   Gross per 24 hour   Intake   1590 ml   Output    300 ml   Net   1290 ml         Constitutional: NAD, comfortable  Cardiovascular: RRR, normal S1, S2   Respiratory: CTAB  Abdomen: soft, non-tender, nondistended,  BS+      Additional Comments     ROS, FH, SH: See initial GI consult for details.    Lab Data     Recent Labs   Lab Test  17   0620  17   1100  16   0840   WBC  9.7  13.1*  6.6   HGB  12.9  13.9  13.9   MCV  91  90  93   PLT  175  182  143*     Recent Labs   Lab Test  17   0620  17   1100  11/11/15   1626   NA  142  142  137   POTASSIUM  3.5  3.8  4.0   CHLORIDE  110*  109  105   CO2  24  23  26   BUN  7  10  9   CR  0.59  0.75  0.72   ANIONGAP  8  10  6   AASHISH  8.6  8.8  9.5     Recent Labs   Lab Test  17   1100  16   1718  12/23/15   1422  11/11/15   1618   09/14/15   1310   03/20/15   1535   13   2254   ALBUMIN  4.0   --    --    --    --   4.1   --   4.3   --   5.1   BILITOTAL  0.3   --    --    --    --   0.3   --   0.4   --   0.7   ALT  23   --    --    --    --   28   --   22   < >  38   AST  20   --    --    --    --   19   --   15   < >  46*   ALKPHOS  46   --    --    --    --   51   --   66   --   58   PROTEIN   --   " Negative  Negative  Negative   < >   --    < >   --    < >   --    LIPASE  105   --    --    --    --   227   --    --    --   42    < > = values in this interval not displayed.               MONA Ruiz MD  [facility]  Office: [phone] call if needed after 5PM or on weekends  Cell: [phone], not available after 5PM at this number

## 2017-01-07 NOTE — H&P
PRIMARY CARE PROVIDER:  Susan Haase       DATE OF ADMISSION:  01/06/2017      CHIEF COMPLAINT:  Intractable nausea and vomiting, abdominal pain.      HISTORY OF PRESENT ILLNESS:  Ms. Jonelle Barba is a 31-year-old female with history of mild intermittent asthma, anxiety and depression and ongoing abdominal pain who presents to the emergency room after she had sudden-onset abdominal pain and retching after upper GI endoscopy today.  The patient reportedly has been having on-and-off abdominal pain for the past year and eventually she had an endoscopy done today.  It was done by Dr. Jason of [facility], and she was found to have paraesophageal hernia with mild esophagitis and biopsy of the stomach was done.  Right after the procedure, she started having intractable retching and coughing and severe abdominal pain.  She also had vomiting of purplish flecks and clear emesis and there was concern about either perforation or Lubna-Tse tear and so she was sent to the emergency room.  The patient, after coming to the emergency room, had a CT done, which was negative for perforation.  Abdominal pain was slightly better, but she continued to have ongoing retching, was not able to tolerate even clear liquids and is being admitted for symptom control.  On questioning further, she denies any other ongoing symptoms.  No chest pain, shortness of breath or palpitations.  No dysuria, urinary urgency or frequency.  No fever or chills.  Her urine output has been slightly low because she has not had much to eat or drink.  Last menstrual period was 2 weeks ago.      PAST MEDICAL HISTORY:    1.  Depression and anxiety.   2.  Mild intermittent asthma.      ALLERGIES:  Ceclor.      SOCIAL AND PERSONAL HISTORY:  She is single.  She works at [email or web address]; she does  work.  Was smoking about 1 pack a week until today.  Drinks alcohol socially.  No recreational drug use.      FAMILY HISTORY:  Paternal grandfather with heart disease  and maternal grandmother with cancer, otherwise unremarkable.      HOME MEDICATIONS:   1.  Albuterol inhaler as needed.   2.  Zoloft 100 mg daily.      REVIEW OF SYSTEMS:  A complete review of system was done and was negative for anything else other than that mentioned in the HPI.      PHYSICAL EXAMINATION:   GENERAL:  A 31-year-old female.  She is in mild distress because of ongoing nausea and retching.   VITAL SIGNS:  Temperature 98.5, blood pressure 127/80, pulse 92, respiration rate 28, O2 sats 98% on room air.   HEENT:  Atraumatic, normocephalic.  No pallor or icterus.   NECK:  Supple with good range of motion.   RESPIRATORY:  Good air entry bilaterally with normal effort of breathing.  No crackles or wheeze.   CARDIOVASCULAR:  Regular rate and rhythm.     GASTROINTESTINAL:  She has got nonspecific tenderness throughout her abdomen.  There is no guarding, rebound or rigidity.  Bowel sound is normoactive.   EXTREMITIES:  No edema.   SKIN:  Warm and dry.   NEUROLOGIC:  Awake, alert, oriented.  Cranial nerves II-XII are intact.      LABORATORY AND DIAGNOSTIC DATA:  Electrolytes are okay with normal lipase and LFTs.  Blood glucose was 196.  White cell count is 13.1.      IMAGING:  CT chest, abdomen and pelvis is negative for bowel perforation.      ASSESSMENT AND PLAN:  Ms. Jonelle Barba is a 31-year-old female with history of on-and-off abdominal pain for a year who underwent EGD today and had intractable retching and abdominal pain post procedure.   1.  Intractable nausea and vomiting, unclear as far as the etiology of this.  Procedure just revealed paraesophageal hernia with mild esophagitis.  Unsure if this is an intolerant reaction to the endoscopy probe.  At this time she continues to have ongoing nausea, vomiting and retching.  She will be admitted for symptom control.  I will put her on Zofran and Compazine.  Will also place her on IV Protonix b.i.d.  GI will be consulted.  CT abdomen does not show any  perforation.  She did have small flecks of purplish vomiting, so will watch her closely for any clinical evidence of [place]-Tse tear.  I will keep her n.p.o. for now.   2.  Depression and anxiety.  Will resume her Zoloft when she starts taking p.o.      Anticipated length of stay less than 2 midnights.      CODE STATUS:  Full code.         PATO MENA MD             D: 2017 15:08   T: 2017 18:50   MT: BAMBI      Name:     STARLA LÓPEZ   MRN:      [id number]        Account:      [id number]   :      1985           Admitted:     [id number]      Document: [id number]

## 2017-01-09 ENCOUNTER — TELEPHONE (OUTPATIENT)
Dept: FAMILY MEDICINE | Facility: CLINIC | Age: 32
End: 2017-01-09

## 2017-01-09 NOTE — TELEPHONE ENCOUNTER
"Hospital/TCU/ED for chronic condition Discharge Protocol    \"Hi, my name is Madalyn NEWBY, a registered nurse, and I am calling from [facility].  I am calling to follow up and see how things are going for you after your recent emergency visit/hospital/TCU stay.\"    Tell me how you are doing now that you are home?\" good home      Discharge Instructions    \"Let's review your discharge instructions.  What is/are the follow-up recommendations?  Pt. Response: see PCP    \"Has an appointment with your primary care provider been scheduled?\"   No (schedule appointment)    \"When you see the provider, I would recommend that you bring your medications with you.\"    Medications    \"Tell me what changed about your medicines when you discharged?\"    Changes to chronic meds?    none    \"What questions do you have about your medications?\"    None     New diagnoses of heart failure, COPD, diabetes, or MI?    No              Medication reconciliation completed? Yes  Was MTM referral placed (*Make sure to put transitions as reason for referral)?   No    Call Summary    \"What questions or concerns do you have about your recent visit and your follow-up care?\"     none    \"If you have questions or things don't continue to improve, we encourage you contact us through the main clinic number (give number).  Even if the clinic is not open, triage nurses are available 24/7 to help you.     We would like you to know that our clinic has extended hours (provide information).  We also have urgent care (provide details on closest location and hours/contact info)\"      \"Thank you for your time and take care!\"  Madalyn López RN, BSN  Message handled by Nurse Triage.               "

## 2017-01-09 NOTE — TELEPHONE ENCOUNTER
ED / Discharge Outreach Protocol    Patient Contact    Attempt # 1    Was call answered?  No.  Left message on voicemail with information to call me back.  Madalyn López RN, BSN  Message handled by Nurse Triage.

## 2017-01-09 NOTE — TELEPHONE ENCOUNTER
Chief Complaint   Patient presents with     Hospital F/U     Inpatient discharge from [facility] on 1/7/2017 Abdominal Pain, Left Upper Quadrant, Intractable Vomiting With Nausea, Unspecified Vomiting Type       Kristal Araujo/

## 2017-01-11 ENCOUNTER — OFFICE VISIT (OUTPATIENT)
Dept: FAMILY MEDICINE | Facility: CLINIC | Age: 32
End: 2017-01-11
Payer: COMMERCIAL

## 2017-01-11 VITALS
WEIGHT: 148 LBS | OXYGEN SATURATION: 98 % | RESPIRATION RATE: 14 BRPM | SYSTOLIC BLOOD PRESSURE: 124 MMHG | BODY MASS INDEX: 26.22 KG/M2 | TEMPERATURE: 98 F | DIASTOLIC BLOOD PRESSURE: 86 MMHG | HEART RATE: 84 BPM | HEIGHT: 63 IN

## 2017-01-11 DIAGNOSIS — R11.0 NAUSEA: ICD-10-CM

## 2017-01-11 DIAGNOSIS — F41.9 ANXIETY: ICD-10-CM

## 2017-01-11 DIAGNOSIS — R10.32 ABDOMINAL PAIN, LEFT LOWER QUADRANT: Primary | ICD-10-CM

## 2017-01-11 PROCEDURE — 99214 OFFICE O/P EST MOD 30 MIN: CPT | Performed by: NURSE PRACTITIONER

## 2017-01-11 RX ORDER — ONDANSETRON 4 MG/1
4 TABLET, FILM COATED ORAL EVERY 8 HOURS PRN
Qty: 12 TABLET | Refills: 0 | Status: ON HOLD | OUTPATIENT
Start: 2017-01-11 | End: 2018-12-22

## 2017-01-11 RX ORDER — HYDROXYZINE HYDROCHLORIDE 25 MG/1
25-50 TABLET, FILM COATED ORAL EVERY 6 HOURS PRN
Qty: 60 TABLET | Refills: 1 | Status: SHIPPED | OUTPATIENT
Start: 2017-01-11 | End: 2017-05-16

## 2017-01-11 NOTE — MR AVS SNAPSHOT
"              After Visit Summary   2017    Jonelle Barba    MRN: [id number]           Patient Information     Date Of Birth          1985        Visit Information        Provider Department      2017 2:30 PM Haase, Susan Rachele, APRN CNP [facility]        Today's Diagnoses     Anxiety    -  1     Nausea            Follow-ups after your visit        Follow-up notes from your care team     Return in about 2 weeks (around 2017).      Who to contact     If you have questions or need follow up information about today's clinic visit or your schedule please contact [facility] directly at [phone].  Normal or non-critical lab and imaging results will be communicated to you by MyChart, letter or phone within 4 business days after the clinic has received the results. If you do not hear from us within 7 days, please contact the clinic through MyChart or phone. If you have a critical or abnormal lab result, we will notify you by phone as soon as possible.  Submit refill requests through [employer] or call your pharmacy and they will forward the refill request to us. Please allow 3 business days for your refill to be completed.          Additional Information About Your Visit        MyChart Information     [employer] lets you send messages to your doctor, view your test results, renew your prescriptions, schedule appointments and more. To sign up, go to [email or web address] . Click on \"Log in\" on the left side of the screen, which will take you to the Welcome page. Then click on \"Sign up Now\" on the right side of the page.     You will be asked to enter the access code listed below, as well as some personal information. Please follow the directions to create your username and password.     Your access code is: [email or web address]  Expires: 3/26/2017 11:52 AM     Your access code will  in 90 days. If you need help or a new code, please call your [facility] or " "[phone].        Care EveryWhere ID     This is your Care EveryWhere ID. This could be used by other organizations to access your [facility] medical records  [id number]        Your Vitals Were     Pulse Temperature Respirations Height BMI (Body Mass Index) Pulse Oximetry    84 98  F (36.7  C) (Oral) 14 5' 3\" (1.6 m) 26.22 kg/m2 98%       Blood Pressure from Last 3 Encounters:   01/11/17 124/86   01/07/17 106/60   12/26/16 137/83    Weight from Last 3 Encounters:   01/11/17 148 lb (67.132 kg)   01/06/17 153 lb (69.4 kg)   12/26/16 151 lb 0.6 oz (68.511 kg)              We Performed the Following     Asthma Action Plan (AAP)          Today's Medication Changes          These changes are accurate as of: 1/11/17  3:07 PM.  If you have any questions, ask your nurse or doctor.               Start taking these medicines.        Dose/Directions    hydrOXYzine 25 MG tablet   Commonly known as:  ATARAX   Used for:  Anxiety   Started by:  Haase, Susan Rachele, APRN CNP        Dose:  25-50 mg   Take 1-2 tablets (25-50 mg) by mouth every 6 hours as needed for anxiety   Quantity:  60 tablet   Refills:  1       ondansetron 4 MG tablet   Commonly known as:  ZOFRAN   Used for:  Nausea   Started by:  Haase, Susan Rachele, APRN CNP        Dose:  4 mg   Take 1 tablet (4 mg) by mouth every 8 hours as needed for nausea   Quantity:  12 tablet   Refills:  0            Where to get your medicines      These medications were sent to [facility] [address] [address]     Phone:  [phone]    - hydrOXYzine 25 MG tablet  - ondansetron 4 MG tablet             Primary Care Provider Office Phone # Fax #    Susan Rachele Haase, APRN [phone]       [address] [address]        Thank you!     Thank you for choosing [facility]  for your care. Our goal is always to provide you with " excellent care. Hearing back from our patients is one way we can continue to improve our services. Please take a few minutes to complete the written survey that you may receive in the mail after your visit with us. Thank you!             Your Updated Medication List - Protect others around you: Learn how to safely use, store and throw away your medicines at www.disposemymeds.org.          This list is accurate as of: 1/11/17  3:07 PM.  Always use your most recent med list.                   Brand Name Dispense Instructions for use    * albuterol 108 (90 BASE) MCG/ACT Inhaler    PROAIR HFA/PROVENTIL HFA/VENTOLIN HFA    1 Inhaler    Inhale 2 puffs into the lungs every 6 hours as needed for shortness of breath / dyspnea or wheezing       * albuterol (2.5 MG/3ML) 0.083% neb solution     75 mL    Take 1 vial (2.5 mg) by nebulization every 6 hours as needed for shortness of breath / dyspnea or wheezing       hydrOXYzine 25 MG tablet    ATARAX    60 tablet    Take 1-2 tablets (25-50 mg) by mouth every 6 hours as needed for anxiety       nicotine polacrilex 2 MG gum    NICORELIEF    90 tablet    Place 1 each (2 mg) inside cheek as needed for smoking cessation       omeprazole 20 MG CR capsule    priLOSEC    90 capsule    Take 1 capsule (20 mg) by mouth daily       ondansetron 4 MG tablet    ZOFRAN    12 tablet    Take 1 tablet (4 mg) by mouth every 8 hours as needed for nausea       sertraline 100 MG tablet    ZOLOFT    30 tablet    Take 1 tablet (100 mg) by mouth daily       * Notice:  This list has 2 medication(s) that are the same as other medications prescribed for you. Read the directions carefully, and ask your doctor or other care provider to review them with you.

## 2017-01-11 NOTE — PROGRESS NOTES
SUBJECTIVE:                                                    Jonelle Barba is a 31 year old female who presents to clinic today for the following health issues:    Hospital Follow-up Visit:  Hospital/Nursing Home/IP Rehab Facility: [facility]  Date of Admission: 1/6/2017  Date of Discharge: 1/7/2017  Reason(s) for Admission: abdominal pain            Problems taking medications regularly:  None       Medication changes since discharge: None       Problems adhering to non-medication therapy:  None    Summary of hospitalization:  [facility] discharge summary reviewed  Diagnostic Tests/Treatments reviewed.  Follow up needed: [facility] and barium swallow, scheduled for 1/12/2017.    Other Healthcare Providers Involved in Patient s Care: [facility]       Update since discharge: over the past year has had abdominal pain, gradually increasing.  Was seen by [facility] and an endoscopy was ordered for further evaluation of the pain.  On 1/16 had the endoscopy completed, after the procedure developed increased abdominal pain with nausea and vomiting  was admitted overnight.  Since discharge on 1/17 has continues to have abdominal pain that is located in the LUQ and LLQ.  Nauseated, tolerating sips of fluid and crackers.  Denies vomiting, fever.  Last BM was this morning and was diarrhea.  Taking omeprazole on daily basis as prescribed.   Anxiety:  Feels that anxiety has been increased over the past 2 weeks, unknown reason since work, home life has been stable.  Taking sertraline 100 mg every day.  Denies thoughts of harming self or others.     Post Discharge Medication Reconciliation: discharge medications reconciled, continue medications without change.  Plan of care communicated with patient     Coding guidelines for this visit:  Type of Medical   Decision Making Face-to-Face Visit       within 7 Days of discharge Face-to-Face Visit        within 14 days of discharge   Moderate Complexity [id number]   High  Complexity [id number]        Problem list and histories reviewed & adjusted, as indicated.  Additional history: as documented    Patient Active Problem List   Diagnosis     Allergic rhinitis     Health Care Home     ACP (advance care planning)     Bipolar 2 disorder (H)     Migraine without aura and without status migrainosus, not intractable     Mild intermittent asthma without complication     Intractable nausea and vomiting     Past Surgical History   Procedure Laterality Date     C nonspecific procedure  age 10      chalazion removal under general       section  12/10/2013     Procedure:  SECTION;   Section for failure to progress. ;  Surgeon: Monique Can MD;  Location: [facility] L+D     Laparoscopic cholecystectomy N/A 2015     Procedure: LAPAROSCOPIC CHOLECYSTECTOMY;  Surgeon: Yuliet Hutton MD;  Location: [facility] OR       Social History   Substance Use Topics     Smoking status: Light Tobacco Smoker     Types: Cigarettes     Smokeless tobacco: Never Used      Comment: occasional     Alcohol Use: No     Family History   Problem Relation Age of Onset     HEART DISEASE Paternal Grandfather      MI in his 60's      Breast Cancer Maternal Grandmother       M great grandmother  in her 60's      Depression Maternal Grandmother      Family History Negative Mother      DIABETES Paternal Aunt      Dx in her mid to late 30's (sounds like DM2)     Depression Paternal Aunt      Anxiety Disorder Paternal Aunt      Substance Abuse Paternal Aunt      CANCER Maternal Grandfather      skin CA      Asthma Father      GASTROINTESTINAL DISEASE Father      s/p maninder. Also Paunt and Mgf s/p maninder.      Asthma Brother      Asthma Sister      Depression Sister      Anxiety Disorder Sister      C.A.D. Paternal Grandmother      Stent placed age 63      Depression Paternal Grandmother      Depression Paternal Uncle      Substance Abuse Paternal Uncle      Anxiety Disorder Cousin      Gallbladder  "Disease Mother      Gallbladder Disease Maternal Grandfather      Coronary Artery Disease Maternal Grandmother          Current Outpatient Prescriptions   Medication Sig Dispense Refill     omeprazole (PRILOSEC) 20 MG CR capsule Take 1 capsule (20 mg) by mouth daily 90 capsule 0     albuterol (2.5 MG/3ML) 0.083% neb solution Take 1 vial (2.5 mg) by nebulization every 6 hours as needed for shortness of breath / dyspnea or wheezing 75 mL 0     nicotine polacrilex (NICORELIEF) 2 MG gum Place 1 each (2 mg) inside cheek as needed for smoking cessation 90 tablet 1     sertraline (ZOLOFT) 100 MG tablet Take 1 tablet (100 mg) by mouth daily 30 tablet 1     albuterol (PROAIR HFA, PROVENTIL HFA, VENTOLIN HFA) 108 (90 BASE) MCG/ACT inhaler Inhale 2 puffs into the lungs every 6 hours as needed for shortness of breath / dyspnea or wheezing 1 Inhaler 0     Allergies   Allergen Reactions     Ceclor [Cefaclor] Hives       ROS:  C: NEGATIVE for fever, chills, change in weight  E/M: NEGATIVE for ear, mouth and throat problems  R: NEGATIVE for significant cough or SOB  CV: NEGATIVE for chest pain, palpitations or peripheral edema  GI: see HPI  PSYCH:  See HPI  OBJECTIVE:                                                    /86 mmHg  Pulse 84  Temp(Src) 98  F (36.7  C) (Oral)  Resp 14  Ht 5' 3\" (1.6 m)  Wt 148 lb (67.132 kg)  BMI 26.22 kg/m2  SpO2 98%  Body mass index is 26.22 kg/(m^2).   GENERAL: healthy, alert and no distress  NECK: no adenopathy, no asymmetry, masses, or scars and thyroid normal to palpation  RESP: lungs clear to auscultation - no rales, rhonchi or wheezes  CV: regular rate and rhythm, normal S1 S2, no S3 or S4, no murmur, click or rub, no peripheral edema  ABDOMEN: soft, LUQ and LLQ tenderness, no hepatosplenomegaly, no masses and bowel sounds normal     ASSESSMENT:                                                    See below    PLAN:                                                    Jonelle was seen " today for hospital f/u.    Diagnoses and all orders for this visit:    Abdominal pain, left lower quadrant:  Encouraged taking frequent sips of water, bland foods, Continue on omeprazole 20 mg every day 30 min prior to breakfast,  GI follow up tomorrow.   -     ondansetron (ZOFRAN) 4 MG tablet; Take 1 tablet (4 mg) by mouth every 8 hours as needed for nausea    Nausea:  Sips of fluid and bland foods.  -     ondansetron (ZOFRAN) 4 MG tablet; Take 1 tablet (4 mg) by mouth every 8 hours as needed for nausea    Anxiety: continue on sertraline 100 mg every day, will also prescribe hydroxyzine to take for prn increased anxiety  -     hydrOXYzine (ATARAX) 25 MG tablet; Take 1-2 tablets (25-50 mg) by mouth every 6 hours as needed for anxiety  FUTURE APPOINTMENTS:       - Follow-up visit in 2 weeks, sooner as needed.    Susan Haase, APRN [facility]

## 2017-01-11 NOTE — NURSING NOTE
"Chief Complaint   Patient presents with     Hospital F/U       Initial /86 mmHg  Pulse 84  Temp(Src) 98  F (36.7  C) (Oral)  Resp 14  Ht 5' 3\" (1.6 m)  Wt 148 lb (67.132 kg)  BMI 26.22 kg/m2  SpO2 98% Estimated body mass index is 26.22 kg/(m^2) as calculated from the following:    Height as of this encounter: 5' 3\" (1.6 m).    Weight as of this encounter: 148 lb (67.132 kg).  BP completed using cuff size: lien Quinn CMA      "

## 2017-01-12 ENCOUNTER — TRANSFERRED RECORDS (OUTPATIENT)
Dept: HEALTH INFORMATION MANAGEMENT | Facility: CLINIC | Age: 32
End: 2017-01-12

## 2017-01-18 ENCOUNTER — HOSPITAL ENCOUNTER (OUTPATIENT)
Dept: GENERAL RADIOLOGY | Facility: CLINIC | Age: 32
Discharge: HOME | End: 2017-01-18
Attending: PHYSICIAN ASSISTANT | Admitting: PHYSICIAN ASSISTANT
Payer: COMMERCIAL

## 2017-01-18 DIAGNOSIS — R10.12 LEFT UPPER QUADRANT PAIN: ICD-10-CM

## 2017-01-18 PROCEDURE — 74240 X-RAY XM UPR GI TRC 1CNTRST: CPT

## 2017-01-18 PROCEDURE — 25500045 ZZH RX 255: Performed by: PHYSICIAN ASSISTANT

## 2017-01-18 RX ADMIN — ANTACID/ANTIFLATULENT 4 G: 380; 550; 10; 10 GRANULE, EFFERVESCENT ORAL at 08:10

## 2017-02-08 ENCOUNTER — TRANSFERRED RECORDS (OUTPATIENT)
Dept: HEALTH INFORMATION MANAGEMENT | Facility: CLINIC | Age: 32
End: 2017-02-08

## 2017-02-14 ENCOUNTER — OFFICE VISIT (OUTPATIENT)
Dept: URGENT CARE | Facility: URGENT CARE | Age: 32
End: 2017-02-14
Payer: COMMERCIAL

## 2017-02-14 VITALS
OXYGEN SATURATION: 98 % | SYSTOLIC BLOOD PRESSURE: 102 MMHG | BODY MASS INDEX: 25.69 KG/M2 | WEIGHT: 145 LBS | DIASTOLIC BLOOD PRESSURE: 76 MMHG | HEART RATE: 74 BPM | TEMPERATURE: 99.1 F

## 2017-02-14 DIAGNOSIS — M54.50 ACUTE LEFT-SIDED LOW BACK PAIN WITHOUT SCIATICA: Primary | ICD-10-CM

## 2017-02-14 DIAGNOSIS — G43.019 INTRACTABLE MIGRAINE WITHOUT AURA AND WITHOUT STATUS MIGRAINOSUS: ICD-10-CM

## 2017-02-14 PROCEDURE — 96372 THER/PROPH/DIAG INJ SC/IM: CPT | Performed by: PHYSICIAN ASSISTANT

## 2017-02-14 PROCEDURE — 99214 OFFICE O/P EST MOD 30 MIN: CPT | Mod: 25 | Performed by: PHYSICIAN ASSISTANT

## 2017-02-14 RX ORDER — CYCLOBENZAPRINE HCL 10 MG
10 TABLET ORAL
Qty: 14 TABLET | Refills: 0 | Status: SHIPPED | OUTPATIENT
Start: 2017-02-14 | End: 2017-05-16

## 2017-02-14 RX ORDER — KETOROLAC TROMETHAMINE 30 MG/ML
30 INJECTION, SOLUTION INTRAMUSCULAR; INTRAVENOUS ONCE
Qty: 1 ML | Refills: 0 | Status: SHIPPED | OUTPATIENT
Start: 2017-02-14 | End: 2017-02-14

## 2017-02-14 RX ORDER — IBUPROFEN 600 MG/1
600 TABLET, FILM COATED ORAL EVERY 6 HOURS PRN
Qty: 30 TABLET | Refills: 1 | Status: SHIPPED | OUTPATIENT
Start: 2017-02-14 | End: 2017-07-17

## 2017-02-14 NOTE — PROGRESS NOTES
SUBJECTIVE  HPI: Jonelle Barba is a 31 year old female who presents for evaluation of back pain  Symptoms began 1 day(s) ago, have been onset acute and frequency constant and are unchanged.  Pain is located in the low back bilateral region, with radiation to radiates into the left buttocks  Personal hx of back pain is self limited episodes of low back pain in the past.  Pain is exacerbated by: sitting.  Pain is relieved by: standing  ASSOCIATED sx include: denies.  Red flag symptoms: negative.    Pt also complains of migraine onset.  Began 1 hour ago, following exposure to strong scent in female restroom at work.  Patient refuses Work comp process.      Past Medical History   Diagnosis Date     Anxiety      Asthma      Kidney infection 2010     LSIL (low grade squamous intraepithelial lesion) on Pap smear 7/20/10     resolved     Mild major depression (H)      Sleep apnea      Current Outpatient Prescriptions   Medication Sig Dispense Refill     ibuprofen (ADVIL/MOTRIN) 600 MG tablet Take 1 tablet (600 mg) by mouth every 6 hours as needed for moderate pain 30 tablet 1     cyclobenzaprine (FLEXERIL) 10 MG tablet Take 1 tablet (10 mg) by mouth nightly as needed for muscle spasms 14 tablet 0     ketorolac (TORADOL) 30 MG/ML injection Inject 1 mL (30 mg) into the muscle once for 1 dose 1 mL 0     hydrOXYzine (ATARAX) 25 MG tablet Take 1-2 tablets (25-50 mg) by mouth every 6 hours as needed for anxiety 60 tablet 1     albuterol (2.5 MG/3ML) 0.083% neb solution Take 1 vial (2.5 mg) by nebulization every 6 hours as needed for shortness of breath / dyspnea or wheezing 75 mL 0     nicotine polacrilex (NICORELIEF) 2 MG gum Place 1 each (2 mg) inside cheek as needed for smoking cessation 90 tablet 1     sertraline (ZOLOFT) 100 MG tablet Take 1 tablet (100 mg) by mouth daily 30 tablet 1     albuterol (PROAIR HFA, PROVENTIL HFA, VENTOLIN HFA) 108 (90 BASE) MCG/ACT inhaler Inhale 2 puffs into the lungs every 6 hours as needed  for shortness of breath / dyspnea or wheezing 1 Inhaler 0     ondansetron (ZOFRAN) 4 MG tablet Take 1 tablet (4 mg) by mouth every 8 hours as needed for nausea (Patient not taking: Reported on 2/14/2017) 12 tablet 0     omeprazole (PRILOSEC) 20 MG CR capsule Take 1 capsule (20 mg) by mouth daily (Patient not taking: Reported on 2/14/2017) 90 capsule 0     Social History   Substance Use Topics     Smoking status: Light Tobacco Smoker     Types: Cigarettes     Smokeless tobacco: Never Used      Comment: occasional     Alcohol use No       ROS:  Review of systems negative except as stated above.    OBJECTIVE:  /76 (BP Location: Right arm, Patient Position: Chair, Cuff Size: Adult Regular)  Pulse 74  Temp 99.1  F (37.3  C) (Tympanic)  Wt 145 lb (65.8 kg)  SpO2 98%  BMI 25.69 kg/m2  Back examination: Back symmetric, no curvature. ROM normal. No CVA tenderness.  Pain with flexion and extension.  GENERAL APPEARANCE: healthy, alert and no distress  RESP: lungs clear to auscultation - no rales, rhonchi or wheezes  CV: regular rates and rhythm, normal S1 S2, no murmur noted  ABDOMEN:  soft, nontender, no HSM or masses and bowel sounds normal  NEURO: Normal strength and tone with no weakness or sensory deficit noted, reflexes normal   SKIN: no suspicious lesions or rashes    ASSESSMENT/IMPRESSION:  (M54.5) Acute left-sided low back pain without sciatica  (primary encounter diagnosis)  Plan: ibuprofen (ADVIL/MOTRIN) 600 MG tablet,         cyclobenzaprine (FLEXERIL) 10 MG tablet       Ice, movement    (G43.019) Intractable migraine without aura and without status migrainosus  Plan: ketorolac (TORADOL) 30 MG/ML injection    Follow up with PCP if symptoms worsen or fail to improve

## 2017-02-14 NOTE — MR AVS SNAPSHOT
"              After Visit Summary   2017    Jonelle Barba    MRN: [id number]           Patient Information     Date Of Birth          1985        Visit Information        Provider Department      2017 11:45 AM Juan José Michel PA-C [facility]        Today's Diagnoses     Acute left-sided low back pain without sciatica    -  1    Intractable migraine without aura and without status migrainosus           Follow-ups after your visit        Who to contact     If you have questions or need follow up information about today's clinic visit or your schedule please contact [facility] directly at [phone].  Normal or non-critical lab and imaging results will be communicated to you by [employer], letter or phone within 4 business days after the clinic has received the results. If you do not hear from us within 7 days, please contact the clinic through [employer] or phone. If you have a critical or abnormal lab result, we will notify you by phone as soon as possible.  Submit refill requests through [employer] or call your pharmacy and they will forward the refill request to us. Please allow 3 business days for your refill to be completed.          Additional Information About Your Visit        MyChart Information     [employer] lets you send messages to your doctor, view your test results, renew your prescriptions, schedule appointments and more. To sign up, go to [email or web address] . Click on \"Log in\" on the left side of the screen, which will take you to the Welcome page. Then click on \"Sign up Now\" on the right side of the page.     You will be asked to enter the access code listed below, as well as some personal information. Please follow the directions to create your username and password.     Your access code is: [email or web address]  Expires: 3/26/2017 11:52 AM     Your access code will  in 90 days. If you need help or a new code, please call your [place] clinic or " [phone].        Care EveryWhere ID     This is your Care EveryWhere ID. This could be used by other organizations to access your [facility] medical records  [id number]        Your Vitals Were     Pulse Temperature Pulse Oximetry BMI (Body Mass Index)          74 99.1  F (37.3  C) (Tympanic) 98% 25.69 kg/m2         Blood Pressure from Last 3 Encounters:   02/14/17 102/76   01/11/17 124/86   01/07/17 106/60    Weight from Last 3 Encounters:   02/14/17 145 lb (65.8 kg)   01/11/17 148 lb (67.1 kg)   01/06/17 153 lb (69.4 kg)              Today, you had the following     No orders found for display         Today's Medication Changes          These changes are accurate as of: 2/14/17  3:52 PM.  If you have any questions, ask your nurse or doctor.               Start taking these medicines.        Dose/Directions    cyclobenzaprine 10 MG tablet   Commonly known as:  FLEXERIL   Used for:  Acute left-sided low back pain without sciatica   Started by:  Juan José Michel PA-C        Dose:  10 mg   Take 1 tablet (10 mg) by mouth nightly as needed for muscle spasms   Quantity:  14 tablet   Refills:  0       ibuprofen 600 MG tablet   Commonly known as:  ADVIL/MOTRIN   Used for:  Acute left-sided low back pain without sciatica   Started by:  Juan José Michel PA-C        Dose:  600 mg   Take 1 tablet (600 mg) by mouth every 6 hours as needed for moderate pain   Quantity:  30 tablet   Refills:  1       ketorolac 30 MG/ML injection   Commonly known as:  TORADOL   Used for:  Intractable migraine without aura and without status migrainosus   Started by:  Juan José Michel PA-C        Dose:  30 mg   Inject 1 mL (30 mg) into the muscle once for 1 dose   Quantity:  1 mL   Refills:  0            Where to get your medicines      These medications were sent to [facility] [address]   [address]  [address], [place] [address]     Phone:  [phone]      cyclobenzaprine 10 MG tablet    ibuprofen 600 MG tablet         Some of these will need a paper prescription and others can be bought over the counter.  Ask your nurse if you have questions.     Bring a paper prescription for each of these medications     ketorolac 30 MG/ML injection                Primary Care Provider Office Phone # Fax #    Susan Rachele Haase, APRN [phone]       [address] [address]        Thank you!     Thank you for choosing [facility]  for your care. Our goal is always to provide you with excellent care. Hearing back from our patients is one way we can continue to improve our services. Please take a few minutes to complete the written survey that you may receive in the mail after your visit with us. Thank you!             Your Updated Medication List - Protect others around you: Learn how to safely use, store and throw away your medicines at www.disposemymeds.org.          This list is accurate as of: 2/14/17  3:52 PM.  Always use your most recent med list.                   Brand Name Dispense Instructions for use    * albuterol 108 (90 BASE) MCG/ACT Inhaler    PROAIR HFA/PROVENTIL HFA/VENTOLIN HFA    1 Inhaler    Inhale 2 puffs into the lungs every 6 hours as needed for shortness of breath / dyspnea or wheezing       * albuterol (2.5 MG/3ML) 0.083% neb solution     75 mL    Take 1 vial (2.5 mg) by nebulization every 6 hours as needed for shortness of breath / dyspnea or wheezing       cyclobenzaprine 10 MG tablet    FLEXERIL    14 tablet    Take 1 tablet (10 mg) by mouth nightly as needed for muscle spasms       hydrOXYzine 25 MG tablet    ATARAX    60 tablet    Take 1-2 tablets (25-50 mg) by mouth every 6 hours as needed for anxiety       ibuprofen 600 MG tablet    ADVIL/MOTRIN    30 tablet    Take 1 tablet (600 mg) by mouth every 6 hours as needed for moderate pain       ketorolac 30 MG/ML injection     TORADOL    1 mL    Inject 1 mL (30 mg) into the muscle once for 1 dose       nicotine polacrilex 2 MG gum    NICORELIEF    90 tablet    Place 1 each (2 mg) inside cheek as needed for smoking cessation       omeprazole 20 MG CR capsule    priLOSEC    90 capsule    Take 1 capsule (20 mg) by mouth daily       ondansetron 4 MG tablet    ZOFRAN    12 tablet    Take 1 tablet (4 mg) by mouth every 8 hours as needed for nausea       sertraline 100 MG tablet    ZOLOFT    30 tablet    Take 1 tablet (100 mg) by mouth daily       * Notice:  This list has 2 medication(s) that are the same as other medications prescribed for you. Read the directions carefully, and ask your doctor or other care provider to review them with you.

## 2017-03-21 ENCOUNTER — OFFICE VISIT (OUTPATIENT)
Dept: PEDIATRICS | Facility: CLINIC | Age: 32
End: 2017-03-21
Payer: COMMERCIAL

## 2017-03-21 VITALS
SYSTOLIC BLOOD PRESSURE: 124 MMHG | HEART RATE: 73 BPM | TEMPERATURE: 97.8 F | DIASTOLIC BLOOD PRESSURE: 72 MMHG | BODY MASS INDEX: 26.17 KG/M2 | OXYGEN SATURATION: 98 % | HEIGHT: 63 IN | WEIGHT: 147.7 LBS

## 2017-03-21 DIAGNOSIS — J02.9 ACUTE PHARYNGITIS, UNSPECIFIED ETIOLOGY: ICD-10-CM

## 2017-03-21 DIAGNOSIS — R20.2 PARESTHESIAS: Primary | ICD-10-CM

## 2017-03-21 DIAGNOSIS — N91.2 AMENORRHEA: ICD-10-CM

## 2017-03-21 LAB
BETA HCG QUAL IFA URINE: NEGATIVE
DEPRECATED S PYO AG THROAT QL EIA: NORMAL
MICRO REPORT STATUS: NORMAL
SPECIMEN SOURCE: NORMAL

## 2017-03-21 PROCEDURE — 99214 OFFICE O/P EST MOD 30 MIN: CPT | Performed by: PHYSICIAN ASSISTANT

## 2017-03-21 PROCEDURE — 84703 CHORIONIC GONADOTROPIN ASSAY: CPT | Performed by: PHYSICIAN ASSISTANT

## 2017-03-21 PROCEDURE — 87081 CULTURE SCREEN ONLY: CPT | Performed by: PHYSICIAN ASSISTANT

## 2017-03-21 PROCEDURE — 87880 STREP A ASSAY W/OPTIC: CPT | Performed by: PHYSICIAN ASSISTANT

## 2017-03-21 NOTE — PROGRESS NOTES
"  SUBJECTIVE:                                                    Jonelle Barba is a 31 year old female who presents to clinic today for the following health issues:    Joint Pain     Onset: 1 week ago    Description:   Location: left foot  Character: tingly, numb    Intensity: severe    Progression of Symptoms: worse; constant    Accompanying Signs & Symptoms:  Other symptoms: no back pain or hip pain  No redness, swelling, bruising of foot  No pain  No new activities, new shoes  Tingling of the dorsal distal foot including toes   History:   Previous similar pain: no       Precipitating factors:   Trauma or overuse: no     Alleviating factors:  Improved by: massage makes it feel better but sx do not go away       Therapies Tried and outcome: nothing  Three days late with period. LMP: 2/17  Sexually active. Protection: intermittent  Lower abdominal cramping.   No fatigue, nausea, vomiting, breast tenderness.     ST x few days. Intermittent. Achy, sore. No fevers, chills. No ear pain. Intermittent cough. Lots of exposures.     ROS:  ROS otherwise negative    OBJECTIVE:                                                    /72 (BP Location: Right arm, Patient Position: Chair, Cuff Size: Adult Regular)  Pulse 73  Temp 97.8  F (36.6  C) (Oral)  Ht 5' 2.5\" (1.588 m)  Wt 147 lb 11.2 oz (67 kg)  SpO2 98%  BMI 26.58 kg/m2  Body mass index is 26.58 kg/(m^2).   GENERAL: alert, no distress  HENT: ear canals- normal; TMs- normal; Nose- normal; Mouth- mildly erythemic posterior pharynx; no sinus tenderness   NECK: no tenderness, no adenopathy  RESP: lungs clear to auscultation - no rales, no rhonchi, no wheezes  CV: regular rates and rhythm, normal S1 S2, no S3 or S4 and no murmur, no click or rub   Foot Exam: Inspection:  No swelling, redness, bruising. No tenderness of foot or ankle.   No decreased sensation.  Range of Motion: full ROM    Diagnostic test results:  Results for orders placed or performed in visit on " 03/21/17 (from the past 24 hour(s))   Strep, Rapid Screen   Result Value Ref Range    Specimen Description Throat     Rapid Strep A Screen       NEGATIVE: No Group A streptococcal antigen detected by immunoassay, await   culture report.      Micro Report Status FINAL 03/21/2017    Beta HCG qual IFA urine   Result Value Ref Range    Beta HCG Qual IFA Urine Negative NEG        ASSESSMENT/PLAN:                                                    (R20.2) Paresthesias  (primary encounter diagnosis)  Comment: given distribution of symptoms, likely posterior tibial nerve compression. Discussed with patient. Will proceed with NSAIDs and supportive shoes. Referral to ortho will be completed if symptoms persist.   Plan:       (J02.9) Acute pharyngitis, unspecified etiology  Comment: TC pending.   Plan: Strep, Rapid Screen, Beta strep group A         culture            (N91.2) Amenorrhea  Comment: NEGATIVE. Continue to monitor symptoms.   Plan: Beta HCG qual IFA urine            See Patient Instructions    Alise Wilkinson PA-C  [facility]

## 2017-03-21 NOTE — NURSING NOTE
"Chief Complaint   Patient presents with     Foot Pain       Initial /72 (BP Location: Right arm, Patient Position: Chair, Cuff Size: Adult Regular)  Pulse 73  Temp 97.8  F (36.6  C) (Oral)  Ht 5' 2.5\" (1.588 m)  Wt 147 lb 11.2 oz (67 kg)  SpO2 98%  BMI 26.58 kg/m2 Estimated body mass index is 26.58 kg/(m^2) as calculated from the following:    Height as of this encounter: 5' 2.5\" (1.588 m).    Weight as of this encounter: 147 lb 11.2 oz (67 kg).  Medication Reconciliation: complete  "

## 2017-03-21 NOTE — MR AVS SNAPSHOT
After Visit Summary   3/21/2017    Jonelle Barba    MRN: [id number]           Patient Information     Date Of Birth          1985        Visit Information        Provider Department      3/21/2017 11:10 AM Alise Wilkinson PA-C [facility]        Today's Diagnoses     Amenorrhea    -  1    Acute pharyngitis, unspecified etiology        Paresthesias          Care Instructions    1. Posterior tibial nerve compression: begin ibuprofen four times daily and wear supportive shoes. If no improvement, will refer to ortho.   2. Rapid strep test is negative. Throat culture is pending.   Continue to check temperatures three times daily. Continue with rest and fluids. May use ibuprofen or salt water rinses for symptomatic relief.   Do not share drinks/food with others.   Return to clinic if symptoms persist or worsen.   3. Pregnancy test is NEGATIVE.             Follow-ups after your visit        Your next 10 appointments already scheduled     Mar 23, 2017  6:30 PM CDT   SHORT with Susan Rachele Haase, APRN CNP   [facility] ([facility])    [address] [place] [id number] [phone]              Who to contact     If you have questions or need follow up information about today's clinic visit or your schedule please contact [facility] directly at [phone].  Normal or non-critical lab and imaging results will be communicated to you by MyChart, letter or phone within 4 business days after the clinic has received the results. If you do not hear from us within 7 days, please contact the clinic through MyChart or phone. If you have a critical or abnormal lab result, we will notify you by phone as soon as possible.  Submit refill requests through [employer] or call your pharmacy and they will forward the refill request to us. Please allow 3 business days for your refill to be completed.          Additional Information  "About Your Visit        [employer] Information     [employer] lets you send messages to your doctor, view your test results, renew your prescriptions, schedule appointments and more. To sign up, go to [email or web address] . Click on \"Log in\" on the left side of the screen, which will take you to the Welcome page. Then click on \"Sign up Now\" on the right side of the page.     You will be asked to enter the access code listed below, as well as some personal information. Please follow the directions to create your username and password.     Your access code is: [email or web address]  Expires: 3/26/2017 12:52 PM     Your access code will  in 90 days. If you need help or a new code, please call your [place] clinic or [phone].        Care EveryWhere ID     This is your Care EveryWhere ID. This could be used by other organizations to access your [place] medical records  [id number]        Your Vitals Were     Pulse Temperature Height Pulse Oximetry BMI (Body Mass Index)       73 97.8  F (36.6  C) (Oral) 5' 2.5\" (1.588 m) 98% 26.58 kg/m2        Blood Pressure from Last 3 Encounters:   17 124/72   17 102/76   17 124/86    Weight from Last 3 Encounters:   17 147 lb 11.2 oz (67 kg)   17 145 lb (65.8 kg)   17 148 lb (67.1 kg)              We Performed the Following     Beta HCG qual IFA urine     Beta strep group A culture     Strep, Rapid Screen          Today's Medication Changes          These changes are accurate as of: 3/21/17 11:47 AM.  If you have any questions, ask your nurse or doctor.               Stop taking these medicines if you haven't already. Please contact your care team if you have questions.     nicotine polacrilex 2 MG gum   Commonly known as:  NICORELIEF   Stopped by:  Alise Wilkinson PA-C           omeprazole 20 MG CR capsule   Commonly known as:  priLOSEC   Stopped by:  Alise Wilkinson PA-C                    Primary Care Provider Office Phone " # Fax #    Susan Rachele Haase, APRN -[phone]       [address] [address]        Thank you!     Thank you for choosing [facility]  for your care. Our goal is always to provide you with excellent care. Hearing back from our patients is one way we can continue to improve our services. Please take a few minutes to complete the written survey that you may receive in the mail after your visit with us. Thank you!             Your Updated Medication List - Protect others around you: Learn how to safely use, store and throw away your medicines at www.disposemymeds.org.          This list is accurate as of: 3/21/17 11:47 AM.  Always use your most recent med list.                   Brand Name Dispense Instructions for use    * albuterol 108 (90 BASE) MCG/ACT Inhaler    PROAIR HFA/PROVENTIL HFA/VENTOLIN HFA    1 Inhaler    Inhale 2 puffs into the lungs every 6 hours as needed for shortness of breath / dyspnea or wheezing       * albuterol (2.5 MG/3ML) 0.083% neb solution     75 mL    Take 1 vial (2.5 mg) by nebulization every 6 hours as needed for shortness of breath / dyspnea or wheezing       cyclobenzaprine 10 MG tablet    FLEXERIL    14 tablet    Take 1 tablet (10 mg) by mouth nightly as needed for muscle spasms       hydrOXYzine 25 MG tablet    ATARAX    60 tablet    Take 1-2 tablets (25-50 mg) by mouth every 6 hours as needed for anxiety       ibuprofen 600 MG tablet    ADVIL/MOTRIN    30 tablet    Take 1 tablet (600 mg) by mouth every 6 hours as needed for moderate pain       ondansetron 4 MG tablet    ZOFRAN    12 tablet    Take 1 tablet (4 mg) by mouth every 8 hours as needed for nausea       sertraline 100 MG tablet    ZOLOFT    30 tablet    Take 1 tablet (100 mg) by mouth daily       * Notice:  This list has 2 medication(s) that are the same as other medications prescribed for you. Read the directions carefully, and ask your doctor or  other care provider to review them with you.

## 2017-03-21 NOTE — PATIENT INSTRUCTIONS
1. Posterior tibial nerve compression: begin ibuprofen four times daily and wear supportive shoes. If no improvement, will refer to ortho.   2. Rapid strep test is negative. Throat culture is pending.   Continue to check temperatures three times daily. Continue with rest and fluids. May use ibuprofen or salt water rinses for symptomatic relief.   Do not share drinks/food with others.   Return to clinic if symptoms persist or worsen.   3. Pregnancy test is NEGATIVE.

## 2017-03-23 LAB
BACTERIA SPEC CULT: NORMAL
MICRO REPORT STATUS: NORMAL
SPECIMEN SOURCE: NORMAL

## 2017-05-16 ENCOUNTER — OFFICE VISIT (OUTPATIENT)
Dept: FAMILY MEDICINE | Facility: CLINIC | Age: 32
End: 2017-05-16
Payer: COMMERCIAL

## 2017-05-16 VITALS
BODY MASS INDEX: 25.34 KG/M2 | RESPIRATION RATE: 14 BRPM | SYSTOLIC BLOOD PRESSURE: 100 MMHG | OXYGEN SATURATION: 99 % | DIASTOLIC BLOOD PRESSURE: 60 MMHG | HEIGHT: 63 IN | TEMPERATURE: 98.7 F | WEIGHT: 143 LBS | HEART RATE: 88 BPM

## 2017-05-16 DIAGNOSIS — F41.9 ANXIETY: ICD-10-CM

## 2017-05-16 DIAGNOSIS — J45.20 MILD INTERMITTENT ASTHMA WITHOUT COMPLICATION: ICD-10-CM

## 2017-05-16 DIAGNOSIS — N92.6 IRREGULAR MENSES: ICD-10-CM

## 2017-05-16 DIAGNOSIS — R07.0 THROAT PAIN: Primary | ICD-10-CM

## 2017-05-16 PROCEDURE — 84703 CHORIONIC GONADOTROPIN ASSAY: CPT | Performed by: NURSE PRACTITIONER

## 2017-05-16 PROCEDURE — 99214 OFFICE O/P EST MOD 30 MIN: CPT | Performed by: NURSE PRACTITIONER

## 2017-05-16 PROCEDURE — 87880 STREP A ASSAY W/OPTIC: CPT | Performed by: NURSE PRACTITIONER

## 2017-05-16 PROCEDURE — 87081 CULTURE SCREEN ONLY: CPT | Performed by: NURSE PRACTITIONER

## 2017-05-16 RX ORDER — ALBUTEROL SULFATE 90 UG/1
2 AEROSOL, METERED RESPIRATORY (INHALATION) EVERY 6 HOURS PRN
Qty: 1 INHALER | Refills: 11 | Status: SHIPPED | OUTPATIENT
Start: 2017-05-16 | End: 2017-12-23

## 2017-05-16 RX ORDER — SERTRALINE HYDROCHLORIDE 100 MG/1
100 TABLET, FILM COATED ORAL DAILY
Qty: 90 TABLET | Refills: 1 | Status: SHIPPED | OUTPATIENT
Start: 2017-05-16 | End: 2017-11-09

## 2017-05-16 ASSESSMENT — ANXIETY QUESTIONNAIRES
2. NOT BEING ABLE TO STOP OR CONTROL WORRYING: NOT AT ALL
IF YOU CHECKED OFF ANY PROBLEMS ON THIS QUESTIONNAIRE, HOW DIFFICULT HAVE THESE PROBLEMS MADE IT FOR YOU TO DO YOUR WORK, TAKE CARE OF THINGS AT HOME, OR GET ALONG WITH OTHER PEOPLE: NOT DIFFICULT AT ALL
GAD7 TOTAL SCORE: 2
5. BEING SO RESTLESS THAT IT IS HARD TO SIT STILL: NOT AT ALL
6. BECOMING EASILY ANNOYED OR IRRITABLE: NOT AT ALL
7. FEELING AFRAID AS IF SOMETHING AWFUL MIGHT HAPPEN: NOT AT ALL
3. WORRYING TOO MUCH ABOUT DIFFERENT THINGS: NOT AT ALL
1. FEELING NERVOUS, ANXIOUS, OR ON EDGE: SEVERAL DAYS

## 2017-05-16 ASSESSMENT — PATIENT HEALTH QUESTIONNAIRE - PHQ9: 5. POOR APPETITE OR OVEREATING: SEVERAL DAYS

## 2017-05-16 NOTE — PROGRESS NOTES
SUBJECTIVE:                                                    Jonelle Barba is a 31 year old female who presents to clinic today for the following health issues:    RESPIRATORY SYMPTOMS    Duration: 3 days    Description  sore throat and cough    Severity: moderate    Accompanying signs and symptoms: None    History (predisposing factors):  none    Precipitating or alleviating factors: None    Therapies tried and outcome:  None    Jonelle reports sore throat and cough worsening over the past 3 days. Symptoms have been present for past month. Currently has headache also. Her 3 y/o son has had similar symptoms ongoing for past week.     Asthma -- States that symptoms are not well controlled. Would like refill on albuterol inhaler and neb. Denies wheeze, frequent cough. Smokes 3-4 cigg per day.  ACT SCORE: 9.    Allergies -- Seasonal. Not taking regular medication for problem.    GYN -- Last 2 periods have been about 2 days late and lasting only 4-5 days with light flow which is not normal for her. Period supposed to start today but has not, possibly late again. Experienced cramps and fatigue over past week like normal before periods start. She is not currently using birth control. Is sexually active with male partner (fiance).    Anxiety -- Taking sertraline (100 mg) daily.   PHQ-9: 6  LUIS FELIPE-7: 2     Problem list and histories reviewed & adjusted, as indicated.  Additional history: as documented    Patient Active Problem List   Diagnosis     Allergic rhinitis     Health Care Home     ACP (advance care planning)     Bipolar 2 disorder (H)     Migraine without aura and without status migrainosus, not intractable     Mild intermittent asthma without complication     Intractable nausea and vomiting     Anxiety     Past Surgical History:   Procedure Laterality Date     C NONSPECIFIC PROCEDURE  age 10     chalazion removal under general       SECTION  12/10/2013    Procedure:  SECTION;   Section  for failure to progress. ;  Surgeon: Monique Can MD;  Location: [facility] L+D     LAPAROSCOPIC CHOLECYSTECTOMY N/A 2015    Procedure: LAPAROSCOPIC CHOLECYSTECTOMY;  Surgeon: Yuliet Hutton MD;  Location: [facility] OR       Social History   Substance Use Topics     Smoking status: Light Tobacco Smoker     Types: Cigarettes     Smokeless tobacco: Never Used      Comment: occasional     Alcohol use 0.0 oz/week     0 Standard drinks or equivalent per week      Comment: 1-2 a month     Family History   Problem Relation Age of Onset     Family History Negative Mother      Gallbladder Disease Mother      CANCER Maternal Grandfather      skin CA      Gallbladder Disease Maternal Grandfather      Asthma Father      GASTROINTESTINAL DISEASE Father      s/p maninder. Also Paunt and Mgf s/p maninder.      Asthma Brother      Asthma Sister      Depression Sister      Anxiety Disorder Sister      HEART DISEASE Paternal Grandfather      MI in his 60's      Breast Cancer Maternal Grandmother       M great grandmother  in her 60's      Depression Maternal Grandmother      Coronary Artery Disease Maternal Grandmother      DIABETES Paternal Aunt      Dx in her mid to late 30's (sounds like DM2)     Depression Paternal Aunt      Anxiety Disorder Paternal Aunt      Substance Abuse Paternal Aunt      C.A.D. Paternal Grandmother      Stent placed age 63      Depression Paternal Grandmother      Depression Paternal Uncle      Substance Abuse Paternal Uncle      Anxiety Disorder Cousin          Current Outpatient Prescriptions   Medication Sig Dispense Refill     albuterol (PROAIR HFA/PROVENTIL HFA/VENTOLIN HFA) 108 (90 BASE) MCG/ACT Inhaler Inhale 2 puffs into the lungs every 6 hours as needed for shortness of breath / dyspnea or wheezing 1 Inhaler 11     sertraline (ZOLOFT) 100 MG tablet Take 1 tablet (100 mg) by mouth daily 90 tablet 1     ibuprofen (ADVIL/MOTRIN) 600 MG tablet Take 1 tablet (600 mg) by mouth every 6 hours as  "needed for moderate pain 30 tablet 1     albuterol (2.5 MG/3ML) 0.083% neb solution Take 1 vial (2.5 mg) by nebulization every 6 hours as needed for shortness of breath / dyspnea or wheezing 75 mL 0     ondansetron (ZOFRAN) 4 MG tablet Take 1 tablet (4 mg) by mouth every 8 hours as needed for nausea (Patient not taking: Reported on 5/16/2017) 12 tablet 0     [DISCONTINUED] sertraline (ZOLOFT) 100 MG tablet Take 1 tablet (100 mg) by mouth daily 30 tablet 1     [DISCONTINUED] albuterol (PROAIR HFA, PROVENTIL HFA, VENTOLIN HFA) 108 (90 BASE) MCG/ACT inhaler Inhale 2 puffs into the lungs every 6 hours as needed for shortness of breath / dyspnea or wheezing 1 Inhaler 0     Allergies   Allergen Reactions     Ceclor [Cefaclor] Hives     Reviewed and updated as needed this visit by clinical staff    Reviewed and updated as needed this visit by Provider    ROS:  Constitutional, HEENT, cardiovascular, pulmonary, GI, , musculoskeletal, neuro, skin, endocrine and psych systems are negative, except as otherwise noted.    This document serves as a record of the services and decisions personally performed and made by Susan Haase, CNP. It was created on her behalf by Ruth Fierro, a trained medical scribe. The creation of this document is based the provider's statements to the medical scribe.  Ruth Fierro May 16, 2017 10:21 AM   OBJECTIVE:                                                    /60 (BP Location: Left arm, Patient Position: Chair, Cuff Size: Adult Regular)  Pulse 88  Temp 98.7  F (37.1  C) (Oral)  Resp 14  Ht 1.588 m (5' 2.5\")  Wt 64.9 kg (143 lb)  SpO2 99%  BMI 25.74 kg/m2  Body mass index is 25.74 kg/(m^2).  GENERAL: healthy, alert and no distress  HENT: ear canals and TM's normal, nose and mouth without ulcers or lesions, posterior oropharynx erythematous  NECK: cervical lymph nodes tender to palpation, no adenopathy, no asymmetry, masses, or scars and thyroid normal to palpation  RESP: lungs " clear to auscultation - no rales, rhonchi or wheezes  CV: regular rate and rhythm, normal S1 S2, no S3 or S4, no murmur, click or rub, no peripheral edema  PSYCH: mentation appears normal, affect normal/bright    Diagnostic Test Results:  Results for orders placed or performed in visit on 05/16/17 (from the past 24 hour(s))   Strep, Rapid Screen   Result Value Ref Range    Specimen Description Throat     Rapid Strep A Screen       NEGATIVE: No Group A streptococcal antigen detected by immunoassay, await   culture report.      Micro Report Status FINAL 05/16/2017         ASSESSMENT/PLAN:                                                    Jonelle was seen today for uri and abnormal bleeding problem.    Diagnoses and all orders for this visit:    Throat pain:  Rapid strep negative, patient informed.  -     Strep, Rapid Screen  -     Beta strep group A culture    Mild intermittent asthma without complication:  Poorly controlled, will refill inhaler, also discussed using a daily allergy medication such as Claritin.  -     albuterol (PROAIR HFA/PROVENTIL HFA/VENTOLIN HFA) 108 (90 BASE) MCG/ACT Inhaler; Inhale 2 puffs into the lungs every 6 hours as needed for shortness of breath / dyspnea or wheezing    Irregular menses: HCG negative, patient informed.   -     Beta HCG qual IFA urine    Anxiety:  Well controlled, refill Zoloft  -     sertraline (ZOLOFT) 100 MG tablet; Take 1 tablet (100 mg) by mouth daily    Follow up visit as needed if symptoms worsen or do not improve, sooner as needed.    The information in this document, created by the medical scribe for me, accurately reflects the services I personally performed and the decisions made by me. I have reviewed and approved this document for accuracy.   Susan Haase, APRN [facility]

## 2017-05-16 NOTE — MR AVS SNAPSHOT
"              After Visit Summary   2017    Jonelle Barba    MRN: [id number]           Patient Information     Date Of Birth          1985        Visit Information        Provider Department      2017 10:15 AM Haase, Susan Rachele, APRN CNP [facility]        Today's Diagnoses     Throat pain    -  1    Mild intermittent asthma without complication        Irregular menses        Anxiety           Follow-ups after your visit        Follow-up notes from your care team     Return if symptoms worsen or fail to improve.      Who to contact     If you have questions or need follow up information about today's clinic visit or your schedule please contact [facility] directly at [phone].  Normal or non-critical lab and imaging results will be communicated to you by MyChart, letter or phone within 4 business days after the clinic has received the results. If you do not hear from us within 7 days, please contact the clinic through MyChart or phone. If you have a critical or abnormal lab result, we will notify you by phone as soon as possible.  Submit refill requests through [email or web address] or call your pharmacy and they will forward the refill request to us. Please allow 3 business days for your refill to be completed.          Additional Information About Your Visit        MyChart Information     [email or web address] lets you send messages to your doctor, view your test results, renew your prescriptions, schedule appointments and more. To sign up, go to [email or web address] . Click on \"Log in\" on the left side of the screen, which will take you to the Welcome page. Then click on \"Sign up Now\" on the right side of the page.     You will be asked to enter the access code listed below, as well as some personal information. Please follow the directions to create your username and password.     Your access code is: [email or web address]  Expires: 2017 10:50 AM     Your access code will  " "in 90 days. If you need help or a new code, please call your [place] clinic or [phone].        Care EveryWhere ID     This is your Care EveryWhere ID. This could be used by other organizations to access your [facility] medical records  [id number]        Your Vitals Were     Pulse Temperature Respirations Height Pulse Oximetry BMI (Body Mass Index)    88 98.7  F (37.1  C) (Oral) 14 5' 2.5\" (1.588 m) 99% 25.74 kg/m2       Blood Pressure from Last 3 Encounters:   05/16/17 100/60   03/21/17 124/72   02/14/17 102/76    Weight from Last 3 Encounters:   05/16/17 143 lb (64.9 kg)   03/21/17 147 lb 11.2 oz (67 kg)   02/14/17 145 lb (65.8 kg)              We Performed the Following     Beta HCG qual IFA urine     Beta strep group A culture     Strep, Rapid Screen          Where to get your medicines      These medications were sent to [facility] [address]  [address] [facility] [address]     Phone:  [phone]     albuterol 108 (90 BASE) MCG/ACT Inhaler    sertraline 100 MG tablet          Primary Care Provider Office Phone # Fax #    Susan Rachele Haase, APRN [phone]       [facility] [address] [place] [id number]        Thank you!     Thank you for choosing [facility]  for your care. Our goal is always to provide you with excellent care. Hearing back from our patients is one way we can continue to improve our services. Please take a few minutes to complete the written survey that you may receive in the mail after your visit with us. Thank you!             Your Updated Medication List - Protect others around you: Learn how to safely use, store and throw away your medicines at www.disposemymeds.org.          This list is accurate as of: 5/16/17 10:50 AM.  Always use your most recent med list.                   Brand Name Dispense Instructions for use    * albuterol (2.5 MG/3ML) 0.083% neb solution "     75 mL    Take 1 vial (2.5 mg) by nebulization every 6 hours as needed for shortness of breath / dyspnea or wheezing       * albuterol 108 (90 BASE) MCG/ACT Inhaler    PROAIR HFA/PROVENTIL HFA/VENTOLIN HFA    1 Inhaler    Inhale 2 puffs into the lungs every 6 hours as needed for shortness of breath / dyspnea or wheezing       ibuprofen 600 MG tablet    ADVIL/MOTRIN    30 tablet    Take 1 tablet (600 mg) by mouth every 6 hours as needed for moderate pain       ondansetron 4 MG tablet    ZOFRAN    12 tablet    Take 1 tablet (4 mg) by mouth every 8 hours as needed for nausea       sertraline 100 MG tablet    ZOLOFT    90 tablet    Take 1 tablet (100 mg) by mouth daily       * Notice:  This list has 2 medication(s) that are the same as other medications prescribed for you. Read the directions carefully, and ask your doctor or other care provider to review them with you.

## 2017-05-17 LAB
BACTERIA SPEC CULT: NORMAL
MICRO REPORT STATUS: NORMAL
SPECIMEN SOURCE: NORMAL

## 2017-05-17 ASSESSMENT — PATIENT HEALTH QUESTIONNAIRE - PHQ9: SUM OF ALL RESPONSES TO PHQ QUESTIONS 1-9: 6

## 2017-05-17 ASSESSMENT — ANXIETY QUESTIONNAIRES: GAD7 TOTAL SCORE: 2

## 2017-05-17 ASSESSMENT — ASTHMA QUESTIONNAIRES: ACT_TOTALSCORE: 9

## 2017-06-13 ENCOUNTER — TELEPHONE (OUTPATIENT)
Dept: FAMILY MEDICINE | Facility: CLINIC | Age: 32
End: 2017-06-13

## 2017-06-13 NOTE — TELEPHONE ENCOUNTER
Panel Management Review      Patient has the following on her problem list:     Depression / Dysthymia review  PHQ-9 SCORE 4/27/2016 9/15/2016 5/16/2017   Total Score - - -   Total Score MyChart - - -   Total Score 9 9 6      Patient is due for:  None    Asthma review     ACT Total Scores 5/16/2017   ACT TOTAL SCORE (Goal Greater than or Equal to 20) 9   In the past 12 months, how many times did you visit the emergency room for your asthma without being admitted to the hospital? 0   In the past 12 months, how many times were you hospitalized overnight because of your asthma? 0      1. Is Asthma diagnosis on the Problem List? Yes    2. Is Asthma listed on Health Maintenance? Yes    3. Patient is due for:  ACT      Composite cancer screening  Chart review shows that this patient is due/due soon for the following None  Summary:    Patient is due/failing the following:   ACT    Action needed:   Patient needs to do ACT.    Type of outreach:    spoke with pt and had her fill out paper copy of ACT    Questions for provider review:    Pts ACT score is 17. Do you recommend any follow up?                                                                                                                                    Jonelle Quinn [facility]       Chart routed to Provider .

## 2017-06-14 ASSESSMENT — ASTHMA QUESTIONNAIRES: ACT_TOTALSCORE: 17

## 2017-06-14 NOTE — TELEPHONE ENCOUNTER
Can you ask her to add a daily allergy medication such as singulair?  If she is already doing this have her set up an appt with me we may need to add a daily controller medication.  Thanks,  Susan Haase, CNP

## 2017-07-17 ENCOUNTER — OFFICE VISIT (OUTPATIENT)
Dept: FAMILY MEDICINE | Facility: CLINIC | Age: 32
End: 2017-07-17
Payer: COMMERCIAL

## 2017-07-17 VITALS
BODY MASS INDEX: 25.74 KG/M2 | SYSTOLIC BLOOD PRESSURE: 110 MMHG | TEMPERATURE: 98.3 F | HEART RATE: 76 BPM | WEIGHT: 143 LBS | OXYGEN SATURATION: 97 % | RESPIRATION RATE: 12 BRPM | DIASTOLIC BLOOD PRESSURE: 64 MMHG

## 2017-07-17 DIAGNOSIS — G43.009 MIGRAINE WITHOUT AURA AND WITHOUT STATUS MIGRAINOSUS, NOT INTRACTABLE: Primary | ICD-10-CM

## 2017-07-17 DIAGNOSIS — J45.20 MILD INTERMITTENT ASTHMA WITHOUT COMPLICATION: ICD-10-CM

## 2017-07-17 DIAGNOSIS — N92.6 IRREGULAR PERIODS: ICD-10-CM

## 2017-07-17 DIAGNOSIS — Z32.01 PREGNANCY TEST POSITIVE: ICD-10-CM

## 2017-07-17 DIAGNOSIS — F31.81 BIPOLAR 2 DISORDER (H): ICD-10-CM

## 2017-07-17 LAB — BETA HCG QUAL IFA URINE: POSITIVE

## 2017-07-17 PROCEDURE — 99214 OFFICE O/P EST MOD 30 MIN: CPT | Performed by: NURSE PRACTITIONER

## 2017-07-17 PROCEDURE — 84703 CHORIONIC GONADOTROPIN ASSAY: CPT | Performed by: NURSE PRACTITIONER

## 2017-07-17 NOTE — MR AVS SNAPSHOT
After Visit Summary   7/17/2017    Jonelle Barba    MRN: [id number]           Patient Information     Date Of Birth          1985        Visit Information        Provider Department      7/17/2017 1:45 PM Haase, Susan Rachele, APRN CNP [facility]        Today's Diagnoses     Migraine without aura and without status migrainosus, not intractable    -  1    Mild intermittent asthma without complication        Irregular periods        Pregnancy test positive        Bipolar 2 disorder (H)           Follow-ups after your visit        Follow-up notes from your care team     Return in about 3 days (around 7/20/2017).      Your next 10 appointments already scheduled     Jul 20, 2017  6:00 PM CDT   Office Visit with Susan Rachele Haase, APRN CNP   [facility] ([facility])    [address] [place] [id number] [phone]           Bring a current list of meds and any records pertaining to this visit.  For Physicals, please bring immunization records and any forms needing to be filled out.  Please arrive 10 minutes early to complete paperwork.              Who to contact     If you have questions or need follow up information about today's clinic visit or your schedule please contact [facility] directly at [phone].  Normal or non-critical lab and imaging results will be communicated to you by MyChart, letter or phone within 4 business days after the clinic has received the results. If you do not hear from us within 7 days, please contact the clinic through MyChart or phone. If you have a critical or abnormal lab result, we will notify you by phone as soon as possible.  Submit refill requests through [employer] or call your pharmacy and they will forward the refill request to us. Please allow 3 business days for your refill to be completed.          Additional Information About Your Visit        [employer]  "Information     [employer] lets you send messages to your doctor, view your test results, renew your prescriptions, schedule appointments and more. To sign up, go to [email or web address] . Click on \"Log in\" on the left side of the screen, which will take you to the Welcome page. Then click on \"Sign up Now\" on the right side of the page.     You will be asked to enter the access code listed below, as well as some personal information. Please follow the directions to create your username and password.     Your access code is: [email or web address]  Expires: 2017 10:50 AM     Your access code will  in 90 days. If you need help or a new code, please call your [place] clinic or [phone].        Care EveryWhere ID     This is your [facility] EveryWhere ID. This could be used by other organizations to access your [place] medical records  [id number]        Your Vitals Were     Pulse Temperature Respirations Pulse Oximetry BMI (Body Mass Index)       76 98.3  F (36.8  C) (Oral) 12 97% 25.74 kg/m2        Blood Pressure from Last 3 Encounters:   17 110/64   17 100/60   17 124/72    Weight from Last 3 Encounters:   17 143 lb (64.9 kg)   17 143 lb (64.9 kg)   17 147 lb 11.2 oz (67 kg)              We Performed the Following     Beta HCG qual IFA urine        Primary Care Provider Office Phone # Fax #    Susan Rachele Haase, JUAN [phone]       [address] [address]        Equal Access to Services     WILFREDO AVERY : Hadii joseline coulter Sotory, waaxda luqadaha, qaybta kaalmada adejazlynyada, chago paniagua. So [facility] [phone].    ATENCIÓN: Si habla español, tiene a crockett disposición servicios gratuitos de asistencia lingüística. Llame al [phone].    We comply with applicable federal civil rights laws and [place] laws. We do not discriminate on the basis of race, color, national origin, age, " disability sex, sexual orientation or gender identity.            Thank you!     Thank you for choosing [facility]  for your care. Our goal is always to provide you with excellent care. Hearing back from our patients is one way we can continue to improve our services. Please take a few minutes to complete the written survey that you may receive in the mail after your visit with us. Thank you!             Your Updated Medication List - Protect others around you: Learn how to safely use, store and throw away your medicines at www.disposemymeds.org.          This list is accurate as of: 7/17/17  7:16 PM.  Always use your most recent med list.                   Brand Name Dispense Instructions for use Diagnosis    * albuterol (2.5 MG/3ML) 0.083% neb solution     75 mL    Take 1 vial (2.5 mg) by nebulization every 6 hours as needed for shortness of breath / dyspnea or wheezing    Cough       * albuterol 108 (90 BASE) MCG/ACT Inhaler    PROAIR HFA/PROVENTIL HFA/VENTOLIN HFA    1 Inhaler    Inhale 2 puffs into the lungs every 6 hours as needed for shortness of breath / dyspnea or wheezing    Mild intermittent asthma without complication       ondansetron 4 MG tablet    ZOFRAN    12 tablet    Take 1 tablet (4 mg) by mouth every 8 hours as needed for nausea    Nausea, Abdominal pain, left lower quadrant       sertraline 100 MG tablet    ZOLOFT    90 tablet    Take 1 tablet (100 mg) by mouth daily    Anxiety       * Notice:  This list has 2 medication(s) that are the same as other medications prescribed for you. Read the directions carefully, and ask your doctor or other care provider to review them with you.

## 2017-07-17 NOTE — NURSING NOTE
"Chief Complaint   Patient presents with     Headache       Initial /64 (BP Location: Right arm, Patient Position: Chair, Cuff Size: Adult Regular)  Pulse 76  Temp 98.3  F (36.8  C) (Oral)  Resp 12  Wt 143 lb (64.9 kg)  SpO2 97%  BMI 25.74 kg/m2 Estimated body mass index is 25.74 kg/(m^2) as calculated from the following:    Height as of 5/16/17: 5' 2.5\" (1.588 m).    Weight as of this encounter: 143 lb (64.9 kg).  Medication Reconciliation: complete   Jonelle Quinn CMA      "

## 2017-07-17 NOTE — PROGRESS NOTES
SUBJECTIVE:                                                    Jonelle Barba is a 31 year old female who presents to clinic today for the following health issues:    Migraine Follow-Up    Headaches symptoms:  Worsened     Frequency: several times in the past month     Duration of headaches: 2 days    Able to do normal daily activities/work with migraines: No     Rescue/Relief medication:none     Preventative medication: None    Neurologic complications: No new stroke-like symptoms, loss of vision or speech, numbness or weakness    In the past 4 weeks, how often have you gone to Urgent Care or the emergency room because of your headaches?  0    Has had headaches off and on and worsening today. States that she vomited at work after inhaling chemicals of sanitary wipes that someone opened near her and has felt nauseated since then. Is not taking anything for headaches at this time. Has tried tylenol in the past but does not help.    Asthma -- ACT Score of 20. Symptoms are mostly controled. Sometimes worsened by heat outside.    GYN -- LMP was 6/18-6/19. This was early but lasted shorter than usual. Previously periods had been coming late and lasting longer. Has been having cramps since LMP. Patient is very excited to find out that she is pregnant.       Amount of exercise or physical activity: walking dog    Problems taking medications regularly: No    Medication side effects: none    Diet: regular (no restrictions)  Bipolar disorder: well controlled with counseling and Zoloft 100 mg every day.  Problem list and histories reviewed & adjusted, as indicated.  Additional history: as documented    Patient Active Problem List   Diagnosis     Allergic rhinitis     Health Care Home     ACP (advance care planning)     Bipolar 2 disorder (H)     Migraine without aura and without status migrainosus, not intractable     Mild intermittent asthma without complication     Intractable nausea and vomiting     Anxiety     Past  Surgical History:   Procedure Laterality Date     C NONSPECIFIC PROCEDURE  age 10     chalazion removal under general       SECTION  12/10/2013    Procedure:  SECTION;   Section for failure to progress. ;  Surgeon: Monique Can MD;  Location: [facility] L[facility]     LAPAROSCOPIC CHOLECYSTECTOMY N/A 2015    Procedure: LAPAROSCOPIC CHOLECYSTECTOMY;  Surgeon: Yuliet Hutton MD;  Location: [facility] OR       Social History   Substance Use Topics     Smoking status: Light Tobacco Smoker     Types: Cigarettes     Smokeless tobacco: Never Used      Comment: occasional     Alcohol use 0.0 oz/week     0 Standard drinks or equivalent per week      Comment: 1-2 a month     Family History   Problem Relation Age of Onset     Family History Negative Mother      Gallbladder Disease Mother      CANCER Maternal Grandfather      skin CA      Gallbladder Disease Maternal Grandfather      Asthma Father      GASTROINTESTINAL DISEASE Father      s/p maninder. Also Paunt and Mgf s/p maninder.      Asthma Brother      Asthma Sister      Depression Sister      Anxiety Disorder Sister      HEART DISEASE Paternal Grandfather      MI in his 60's      Breast Cancer Maternal Grandmother       M great grandmother  in her 60's      Depression Maternal Grandmother      Coronary Artery Disease Maternal Grandmother      DIABETES Paternal Aunt      Dx in her mid to late 30's (sounds like DM2)     Depression Paternal Aunt      Anxiety Disorder Paternal Aunt      Substance Abuse Paternal Aunt      C.A.D. Paternal Grandmother      Stent placed age 63      Depression Paternal Grandmother      Depression Paternal Uncle      Substance Abuse Paternal Uncle      Anxiety Disorder Cousin          Current Outpatient Prescriptions   Medication Sig Dispense Refill     albuterol (PROAIR HFA/PROVENTIL HFA/VENTOLIN HFA) 108 (90 BASE) MCG/ACT Inhaler Inhale 2 puffs into the lungs every 6 hours as needed for shortness of breath / dyspnea or  wheezing 1 Inhaler 11     sertraline (ZOLOFT) 100 MG tablet Take 1 tablet (100 mg) by mouth daily 90 tablet 1     albuterol (2.5 MG/3ML) 0.083% neb solution Take 1 vial (2.5 mg) by nebulization every 6 hours as needed for shortness of breath / dyspnea or wheezing 75 mL 0     ondansetron (ZOFRAN) 4 MG tablet Take 1 tablet (4 mg) by mouth every 8 hours as needed for nausea (Patient not taking: Reported on 5/16/2017) 12 tablet 0     Allergies   Allergen Reactions     Ceclor [Cefaclor] Hives     Reviewed and updated as needed this visit by clinical staff  Reviewed and updated as needed this visit by Provider    ROS:  Constitutional, HEENT, cardiovascular, pulmonary, GI, , musculoskeletal, neuro, skin, endocrine and psych systems are negative, except as otherwise noted.    This document serves as a record of the services and decisions personally performed and made by Susan Haase, CNP. It was created on her behalf by Ruth Fierro, a trained medical scribe. The creation of this document is based the provider's statements to the medical scribe.  Ruth Fierro July 17, 2017 1:15 PM   OBJECTIVE:     /64 (BP Location: Right arm, Patient Position: Chair, Cuff Size: Adult Regular)  Pulse 76  Temp 98.3  F (36.8  C) (Oral)  Resp 12  Wt 64.9 kg (143 lb)  SpO2 97%  BMI 25.74 kg/m2  Body mass index is 25.74 kg/(m^2).  GENERAL: healthy, alert and no distress  PSYCH: mentation appears normal, affect normal/bright    Diagnostic Test Results:  Recent Results (from the past 168 hour(s))   Beta HCG qual IFA urine   Result Value Ref Range Status    Beta HCG Qual IFA Urine Positive (A) NEG Final     ASSESSMENT/PLAN:   Jonelle was seen today for headache.    Diagnoses and all orders for this visit:    Migraine without aura and without status migrainosus, not intractable:  Due to pregnancy discussed use of tylenol, dark room, cool washcloth.      Mild intermittent asthma without complication: well controlled ACT  20    Irregular periods: HCG positive, patient informed, very excited.  Will follow up with [facility], unsure of dates due to irregular periods, will call for OB appt, will need Ultrasound to determine.  Is aware of need to start prenatal vitamins, no other OTC medications.    -     Beta HCG qual IFA urine; Future  -     Beta HCG qual IFA urine    Bipolar 2 disorder:  Well controlled, Plans to continue Zoloft during pregnancy  Follow up visit in next 2-4 week with OB, sooner as needed.    The information in this document, created by the medical scribe for me, accurately reflects the services I personally performed and the decisions made by me. I have reviewed and approved this document for accuracy.   Susan Haase, APRN [facility]

## 2017-07-18 ASSESSMENT — ASTHMA QUESTIONNAIRES: ACT_TOTALSCORE: 20

## 2017-07-20 ENCOUNTER — OFFICE VISIT (OUTPATIENT)
Dept: FAMILY MEDICINE | Facility: CLINIC | Age: 32
End: 2017-07-20
Payer: COMMERCIAL

## 2017-07-20 VITALS
DIASTOLIC BLOOD PRESSURE: 60 MMHG | BODY MASS INDEX: 25.74 KG/M2 | HEART RATE: 114 BPM | SYSTOLIC BLOOD PRESSURE: 118 MMHG | TEMPERATURE: 98.6 F | OXYGEN SATURATION: 100 % | RESPIRATION RATE: 12 BRPM | WEIGHT: 143 LBS

## 2017-07-20 DIAGNOSIS — Z02.9 ADMINISTRATIVE ENCOUNTER: Primary | ICD-10-CM

## 2017-07-20 DIAGNOSIS — G43.009 MIGRAINE WITHOUT AURA AND WITHOUT STATUS MIGRAINOSUS, NOT INTRACTABLE: ICD-10-CM

## 2017-07-20 DIAGNOSIS — R11.2 INTRACTABLE VOMITING WITH NAUSEA, UNSPECIFIED VOMITING TYPE: ICD-10-CM

## 2017-07-20 PROCEDURE — 99212 OFFICE O/P EST SF 10 MIN: CPT | Performed by: NURSE PRACTITIONER

## 2017-07-20 NOTE — MR AVS SNAPSHOT
"              After Visit Summary   7/20/2017    Jonelle Barba    MRN: [id number]           Patient Information     Date Of Birth          1985        Visit Information        Provider Department      7/20/2017 6:00 PM Haase, Susan Rachele, APRN CNP [facility]        Today's Diagnoses     Administrative encounter    -  1    Migraine without aura and without status migrainosus, not intractable        Intractable vomiting with nausea, unspecified vomiting type           Follow-ups after your visit        Follow-up notes from your care team     Return in about 6 months (around 1/20/2018).      Who to contact     If you have questions or need follow up information about today's clinic visit or your schedule please contact [facility] directly at [phone].  Normal or non-critical lab and imaging results will be communicated to you by MyChart, letter or phone within 4 business days after the clinic has received the results. If you do not hear from us within 7 days, please contact the clinic through MyChart or phone. If you have a critical or abnormal lab result, we will notify you by phone as soon as possible.  Submit refill requests through [employer] or call your pharmacy and they will forward the refill request to us. Please allow 3 business days for your refill to be completed.          Additional Information About Your Visit        MyChart Information     [employer] lets you send messages to your doctor, view your test results, renew your prescriptions, schedule appointments and more. To sign up, go to [email or web address] . Click on \"Log in\" on the left side of the screen, which will take you to the Welcome page. Then click on \"Sign up Now\" on the right side of the page.     You will be asked to enter the access code listed below, as well as some personal information. Please follow the directions to create your username and password.     Your access code is: " [email or web address]  Expires: 2017 10:50 AM     Your access code will  in 90 days. If you need help or a new code, please call your [place] clinic or [phone].        Care EveryWhere ID     This is your Care EveryWhere ID. This could be used by other organizations to access your [place] medical records  [id number]        Your Vitals Were     Pulse Temperature Respirations Pulse Oximetry BMI (Body Mass Index)       114 98.6  F (37  C) (Oral) 12 100% 25.74 kg/m2        Blood Pressure from Last 3 Encounters:   17 118/60   17 110/64   17 100/60    Weight from Last 3 Encounters:   17 143 lb (64.9 kg)   17 143 lb (64.9 kg)   17 143 lb (64.9 kg)              Today, you had the following     No orders found for display       Primary Care Provider Office Phone # Fax #    Swapna Rachele Haase, APRN [phone]       [address] [address]        Equal Access to Services     AZAR AVERY : Hadii aad ku hadasho Soomaali, waaxda luqadaha, qaybta kaalmada adeegyada, waxay idiin hayaan adejazlyn brandaralydia labarbara . So [facility] [phone].    ATENCIÓN: Si habla español, tiene a crockett disposición servicios gratuitos de asistencia lingüística. Llame al [phone].    We comply with applicable federal civil rights laws and [place] laws. We do not discriminate on the basis of race, color, national origin, age, disability sex, sexual orientation or gender identity.            Thank you!     Thank you for choosing [facility]  for your care. Our goal is always to provide you with excellent care. Hearing back from our patients is one way we can continue to improve our services. Please take a few minutes to complete the written survey that you may receive in the mail after your visit with us. Thank you!             Your Updated Medication List - Protect others around you: Learn how to safely use, store and throw away  your medicines at www.disposemymeds.org.          This list is accurate as of: 7/20/17  7:54 PM.  Always use your most recent med list.                   Brand Name Dispense Instructions for use Diagnosis    * albuterol (2.5 MG/3ML) 0.083% neb solution     75 mL    Take 1 vial (2.5 mg) by nebulization every 6 hours as needed for shortness of breath / dyspnea or wheezing    Cough       * albuterol 108 (90 BASE) MCG/ACT Inhaler    PROAIR HFA/PROVENTIL HFA/VENTOLIN HFA    1 Inhaler    Inhale 2 puffs into the lungs every 6 hours as needed for shortness of breath / dyspnea or wheezing    Mild intermittent asthma without complication       ondansetron 4 MG tablet    ZOFRAN    12 tablet    Take 1 tablet (4 mg) by mouth every 8 hours as needed for nausea    Nausea, Abdominal pain, left lower quadrant       sertraline 100 MG tablet    ZOLOFT    90 tablet    Take 1 tablet (100 mg) by mouth daily    Anxiety       * Notice:  This list has 2 medication(s) that are the same as other medications prescribed for you. Read the directions carefully, and ask your doctor or other care provider to review them with you.

## 2017-07-20 NOTE — PROGRESS NOTES
SUBJECTIVE:                                                    Jonelle Barba is a 31 year old female who presents to clinic today for the following health issues:    Form: FMLA.  Has recurrent migraines and abdominal pain which causes periodic absences from work  Last form completed in 2017, due every 6 months.          Problem list and histories reviewed & adjusted, as indicated.  Additional history: as documented    Patient Active Problem List   Diagnosis     Allergic rhinitis     Health Care Home     ACP (advance care planning)     Bipolar 2 disorder (H)     Migraine without aura and without status migrainosus, not intractable     Mild intermittent asthma without complication     Intractable nausea and vomiting     Anxiety     Pregnancy test positive     Past Surgical History:   Procedure Laterality Date     C NONSPECIFIC PROCEDURE  age 10     chalazion removal under general       SECTION  12/10/2013    Procedure:  SECTION;   Section for failure to progress. ;  Surgeon: Monique Can MD;  Location: [facility] L+D     LAPAROSCOPIC CHOLECYSTECTOMY N/A 2015    Procedure: LAPAROSCOPIC CHOLECYSTECTOMY;  Surgeon: Yuliet Hutton MD;  Location: [facility] OR       Social History   Substance Use Topics     Smoking status: Light Tobacco Smoker     Types: Cigarettes     Smokeless tobacco: Never Used      Comment: occasional     Alcohol use 0.0 oz/week     0 Standard drinks or equivalent per week      Comment: 1-2 a month     Family History   Problem Relation Age of Onset     Family History Negative Mother      Gallbladder Disease Mother      CANCER Maternal Grandfather      skin CA      Gallbladder Disease Maternal Grandfather      Asthma Father      GASTROINTESTINAL DISEASE Father      s/p maninder. Also Paunt and Mgf s/p maninder.      Asthma Brother      Asthma Sister      Depression Sister      Anxiety Disorder Sister      HEART DISEASE Paternal Grandfather      MI in his 60's      Breast  Cancer Maternal Grandmother       M great grandmother  in her 60's      Depression Maternal Grandmother      Coronary Artery Disease Maternal Grandmother      DIABETES Paternal Aunt      Dx in her mid to late 30's (sounds like DM2)     Depression Paternal Aunt      Anxiety Disorder Paternal Aunt      Substance Abuse Paternal Aunt      AVILAABhargavD. Paternal Grandmother      Stent placed age 63      Depression Paternal Grandmother      Depression Paternal Uncle      Substance Abuse Paternal Uncle      Anxiety Disorder Cousin          Current Outpatient Prescriptions   Medication Sig Dispense Refill     albuterol (PROAIR HFA/PROVENTIL HFA/VENTOLIN HFA) 108 (90 BASE) MCG/ACT Inhaler Inhale 2 puffs into the lungs every 6 hours as needed for shortness of breath / dyspnea or wheezing 1 Inhaler 11     sertraline (ZOLOFT) 100 MG tablet Take 1 tablet (100 mg) by mouth daily 90 tablet 1     ondansetron (ZOFRAN) 4 MG tablet Take 1 tablet (4 mg) by mouth every 8 hours as needed for nausea 12 tablet 0     albuterol (2.5 MG/3ML) 0.083% neb solution Take 1 vial (2.5 mg) by nebulization every 6 hours as needed for shortness of breath / dyspnea or wheezing 75 mL 0     Allergies   Allergen Reactions     Ceclor [Cefaclor] Hives     Reviewed and updated as needed this visit by clinical staff  Reviewed and updated as needed this visit by Provider    ROS:  Constitutional, HEENT, cardiovascular, pulmonary, GI, , musculoskeletal, neuro, skin, endocrine and psych systems are negative, except as otherwise noted.    This document serves as a record of the services and decisions personally performed and made by Susan Haase, CNP. It was created on her behalf by Ruth Fierro, a trained medical scribe. The creation of this document is based the provider's statements to the medical scribe.  Ruth Fierro 2017 6:08 PM   OBJECTIVE:   /60 (BP Location: Right arm, Patient Position: Chair, Cuff Size: Adult Regular)  Pulse 114   Temp 98.6  F (37  C) (Oral)  Resp 12  Wt 64.9 kg (143 lb)  SpO2 100%  BMI 25.74 kg/m2  Body mass index is 25.74 kg/(m^2).  GENERAL: healthy, alert and no distress  PSYCH: mentation appears normal, affect normal/bright    Diagnostic Test Results:  none     ASSESSMENT/PLAN:   Jonelle was seen today for forms.    Diagnoses and all orders for this visit:    Administrative encounter:  [facility] papers completed and faxed, original given back to Jonelle.    Migraine without aura and without status migrainosus, not intractable:  Well controlled at this time.    Intractable vomiting with nausea, unspecified vomiting type:  Well controlled at this time.       Follow up visit in 6 months, sooner as needed.    The information in this document, created by the medical scribe for me, accurately reflects the services I personally performed and the decisions made by me. I have reviewed and approved this document for accuracy.   Susan Haase, APRN [facility]

## 2017-07-20 NOTE — NURSING NOTE
"Chief Complaint   Patient presents with     Forms       Initial /60 (BP Location: Right arm, Patient Position: Chair, Cuff Size: Adult Regular)  Pulse 114  Temp 98.6  F (37  C) (Oral)  Resp 12  Wt 143 lb (64.9 kg)  SpO2 100%  BMI 25.74 kg/m2 Estimated body mass index is 25.74 kg/(m^2) as calculated from the following:    Height as of 5/16/17: 5' 2.5\" (1.588 m).    Weight as of this encounter: 143 lb (64.9 kg).  Medication Reconciliation: complete   Jonelle Quinn CMA      "

## 2017-08-28 ENCOUNTER — TRANSFERRED RECORDS (OUTPATIENT)
Dept: HEALTH INFORMATION MANAGEMENT | Facility: CLINIC | Age: 32
End: 2017-08-28

## 2017-09-05 ENCOUNTER — TRANSFERRED RECORDS (OUTPATIENT)
Dept: HEALTH INFORMATION MANAGEMENT | Facility: CLINIC | Age: 32
End: 2017-09-05

## 2017-09-06 ENCOUNTER — HOSPITAL PATHOLOGY (OUTPATIENT)
Dept: OTHER | Facility: CLINIC | Age: 32
End: 2017-09-06

## 2017-09-06 ENCOUNTER — HOSPITAL LABORATORY (OUTPATIENT)
Dept: OTHER | Facility: CLINIC | Age: 32
End: 2017-09-06

## 2017-09-13 LAB
DNA INDEX SPEC FC-ACNC: 1
DNA PLOIDY SPEC FC: NORMAL
PATHOLOGY STUDY: NORMAL
SPECIMEN SOURCE: NORMAL

## 2017-10-06 LAB — COPATH REPORT: NORMAL

## 2017-10-26 ENCOUNTER — OFFICE VISIT (OUTPATIENT)
Dept: URGENT CARE | Facility: URGENT CARE | Age: 32
End: 2017-10-26
Payer: COMMERCIAL

## 2017-10-26 VITALS
BODY MASS INDEX: 25.74 KG/M2 | SYSTOLIC BLOOD PRESSURE: 132 MMHG | OXYGEN SATURATION: 98 % | WEIGHT: 143 LBS | DIASTOLIC BLOOD PRESSURE: 91 MMHG | TEMPERATURE: 98.4 F | HEART RATE: 85 BPM

## 2017-10-26 DIAGNOSIS — J98.01 ACUTE BRONCHOSPASM: ICD-10-CM

## 2017-10-26 DIAGNOSIS — J45.901 EXACERBATION OF ASTHMA, UNSPECIFIED ASTHMA SEVERITY, UNSPECIFIED WHETHER PERSISTENT: ICD-10-CM

## 2017-10-26 DIAGNOSIS — J20.9 ACUTE BRONCHITIS WITH SYMPTOMS > 10 DAYS: Primary | ICD-10-CM

## 2017-10-26 PROCEDURE — 99214 OFFICE O/P EST MOD 30 MIN: CPT | Mod: 25 | Performed by: PHYSICIAN ASSISTANT

## 2017-10-26 PROCEDURE — 94640 AIRWAY INHALATION TREATMENT: CPT | Performed by: PHYSICIAN ASSISTANT

## 2017-10-26 RX ORDER — AZITHROMYCIN 250 MG/1
TABLET, FILM COATED ORAL
Qty: 6 TABLET | Refills: 0 | Status: SHIPPED | OUTPATIENT
Start: 2017-10-26 | End: 2017-11-09

## 2017-10-26 RX ORDER — ALBUTEROL SULFATE 0.83 MG/ML
1 SOLUTION RESPIRATORY (INHALATION) EVERY 6 HOURS PRN
Qty: 25 VIAL | Refills: 0 | Status: SHIPPED | OUTPATIENT
Start: 2017-10-26 | End: 2017-12-23

## 2017-10-26 RX ORDER — IPRATROPIUM BROMIDE AND ALBUTEROL SULFATE 2.5; .5 MG/3ML; MG/3ML
1 SOLUTION RESPIRATORY (INHALATION) ONCE
Qty: 3 ML | Refills: 0 | Status: SHIPPED | OUTPATIENT
Start: 2017-10-26 | End: 2019-05-08

## 2017-10-26 NOTE — PATIENT INSTRUCTIONS
Bronchitis with Wheezing (Viral or Bacterial: Adult)    Bronchitis is an infection of the air passages. It often occurs during a cold and is usually caused by a virus. Symptoms include cough with mucus (phlegm) and low-grade fever. This illness is contagious during the first few days and is spread through the air by coughing and sneezing, or by direct contact (touching the sick person and then touching your own eyes, nose, or mouth).  If there is a lot of inflammation, air flow is restricted. The air passages may also go into spasm, especially if you have asthma. This causes wheezing and difficulty breathing even in people who do not have asthma.  Bronchitis usually lasts 7 to 14 days. The wheezing should improve with treatment during the first week. An inhaler is often prescribed to relax the air passages and stop wheezing. Antibiotics will be prescribed if your doctor thinks there is also a secondary bacterial infection.  Home care    If symptoms are severe, rest at home for the first 2 to 3 days. When you go back to your usual activities, don't let yourself get too tired.    Do not smoke. Also avoid being exposed to secondhand smoke.    You may use over-the-counter medicine to control fever or pain, unless another medicine was prescribed. Note: If you have chronic liver or kidney disease or have ever had a stomach ulcer or gastrointestinal bleeding, talk with your healthcare provider before using these medicines. Also talk to your provider if you are taking medicine to prevent blood clots.) Aspirin should never be given to anyone younger than 18 years of age who is ill with a viral infection or fever. It may cause severe liver or brain damage.    Your appetite may be poor, so a light diet is fine. Avoid dehydration by drinking 6 to 8 glasses of fluids per day (such as water, soft drinks, sports drinks, juices, tea, or soup). Extra fluids will help loosen secretions in the nose and lungs.    Over-the-counter  cough, cold, and sore-throat medicines will not shorten the length of the illness, but they may be helpful to reduce symptoms. (Note: Do not use decongestants if you have high blood pressure.)    If you were given an inhaler, use it exactly as directed. If you need to use it more often than prescribed, your condition may be worsening. If this happens, contact your healthcare provider.    If prescribed, finish all antibiotic medicine, even if you are feeling better after only a few days.  Follow-up care  Follow up with your healthcare provider, or as advised. If you had an X-ray or ECG (electrocardiogram), a specialist will review it. You will be notified of any new findings that may affect your care.  Note: If you are age 65 or older, or if you have a chronic lung disease or condition that affects your immune system, or you smoke, talk to your healthcare provider about having a pneumococcal vaccinations and a yearly influenza vaccination (flu shot).  When to seek medical advice  Call your healthcare provider right away if any of these occur:    Fever of 100.4 F (38 C) or higher    Coughing up increasing amounts of colored sputum    Weakness, drowsiness, headache, facial pain, ear pain, or a stiff neck  Call 911, or get immediate medical care  Contact emergency services right away if any of these occur.    Coughing up blood    Worsening weakness, drowsiness, headache, or stiff neck    Increased wheezing not helped with medication, shortness of breath, or pain with breathing  Date Last Reviewed: 9/13/2015 2000-2017 [employer]. [address] [place] [address]. All rights reserved. This information is not intended as a substitute for professional medical care. Always follow your healthcare professional's instructions.    10/26/17 Urgent Care Visit Plan:     1. Start the Azithromycin (Z-Pack) to treat your bacterial bronchitis.     2.  Use your Albuterol MDI and/or your Albuterol nebs every 6  hours as needed to treat your asthma exacerbation.     3.  Follow-up with your primary care provider if your symptoms do not start improving after 2 days of antibiotics or sooner if your symptoms change or worsen.     4. Follow-up immediately if, unexpectedly, you develop any of the below:     Contact emergency services right away if any of these occur.    Coughing up blood    Worsening weakness, drowsiness, headache, or stiff neck    Increased wheezing not helped with medication, shortness of breath, or pain with breathing

## 2017-10-26 NOTE — PROGRESS NOTES
SUBJECTIVE:    Jonelle Barba is a 32 y.o. Woman with a pmhx that includes intermittent asthma presenting today with c/o nasal congestion, purulent rhinorrhea, productive cough with purulent sputum and waxing and waning body aches x 2 weeks duration.  No fever.     ROS:     HEENT: Positive nasal and sinus congestion.   RESP: Positive cough and wheezing as per above. Despite cough, denies any severe SOB.  Positive hx of intermittent asthma. Patient states typically has exercise and URI triggers. Has been using her home Albuterol MDI prn with some improvement. She has a neb machine at home but hasn't been using because she's out of solution.   GI: Denies any N/V/D. No abdominal pain. Normal BM's  SKIN: Denies rash  NEURO: Positive mild, waxing and waning sinus  HA. Denies any severe headaches, neck stiffness, photophobia, rash, mental status changes or lethargy.     Past Medical History:   Diagnosis Date     Anxiety      Asthma      Kidney infection 2010     LSIL (low grade squamous intraepithelial lesion) on Pap smear 7/20/10    resolved     Mild major depression (H)      Sleep apnea      Current Outpatient Prescriptions   Medication     sertraline (ZOLOFT) 100 MG tablet     albuterol (PROAIR HFA/PROVENTIL HFA/VENTOLIN HFA) 108 (90 BASE) MCG/ACT Inhaler     ondansetron (ZOFRAN) 4 MG tablet     albuterol (2.5 MG/3ML) 0.083% neb solution     No current facility-administered medications for this visit.      Allergies   Allergen Reactions     Ceclor [Cefaclor] Hives       OBJECTIVE:  BP (!) 132/91 (BP Location: Right arm, Patient Position: Chair, Cuff Size: Adult Regular)  Pulse 85  Temp 98.4  F (36.9  C) (Oral)  Wt 143 lb (64.9 kg)  SpO2 98%  BMI 25.74 kg/m2    General appearance: alert and no apparent distress  Skin color is pink and without rash.  HEENT:   Conjunctiva not injected.  Sclera clear.  Left TM is normal: no effusions, no erythema, and normal landmarks.  Right TM is normal: no effusions, no  erythema, and normal landmarks.  Nasal mucosa is congested   Oropharyngeal exam is normal other than evidence of post-nasal drip: no lesions, erythema, adenopathy or exudate.  Neck is supple, FROM with no adenopathy  CARDIAC:NORMAL - regular rate and rhythm without murmur.  RESP:   PRE-NEB: No wheezing, rales or rhonchi on normal tidal volume breathing. Positive wheezing on forced expiration.   POST-NEB: Normal - CTA without rales, rhonchi, or wheezing. No wheezing on forced expiration post-neb. Good movement into all listening areas including bases bilaterally.   NEURO: Alert and oriented.  Normal speech and mentation.  CN II/XII grossly intact.  Gait within normal limits.          ASSESSMENT/PLAN:    (J20.9) Acute bronchitis with symptoms > 10 days  (primary encounter diagnosis)  Plan: azithromycin (ZITHROMAX) 250 MG tablet    10/26/17 Urgent Care Visit Plan:     1. Start the Azithromycin (Z-Pack) to treat your bacterial bronchitis.     2.  Use your Albuterol MDI and/or your Albuterol nebs every 6 hours as needed to treat your asthma exacerbation.     3.  Follow-up with your primary care provider if your symptoms do not start improving after 2 days of antibiotics or sooner if your symptoms change or worsen.     4. Follow-up immediately if, unexpectedly, you develop any of the below:     Contact emergency services right away if any of these occur.    Coughing up blood    Worsening weakness, drowsiness, headache, or stiff neck    Increased wheezing not helped with medication, shortness of breath, or pain with breathing    (J45.901) Exacerbation of asthma, unspecified asthma severity, unspecified whether persistent  Plan: albuterol (2.5 MG/3ML) 0.083% neb solution  As per above     (J98.01) Acute bronchospasm  Plan: ipratropium - albuterol 0.5 mg/2.5 mg/3 mL         (DUONEB) 0.5-2.5 (3) MG/3ML neb solution,         INHALATION/NEBULIZER TREATMENT, INITIAL  As per above

## 2017-10-26 NOTE — MR AVS SNAPSHOT
After Visit Summary   10/26/2017    Jonelle Barba    MRN: [id number]           Patient Information     Date Of Birth          1985        Visit Information        Provider Department      10/26/2017 9:55 AM Angel Bansal PA-C [facility]        Today's Diagnoses     Acute bronchitis with symptoms > 10 days    -  1    Exacerbation of asthma, unspecified asthma severity, unspecified whether persistent        Acute bronchospasm          Care Instructions      Bronchitis with Wheezing (Viral or Bacterial: Adult)    Bronchitis is an infection of the air passages. It often occurs during a cold and is usually caused by a virus. Symptoms include cough with mucus (phlegm) and low-grade fever. This illness is contagious during the first few days and is spread through the air by coughing and sneezing, or by direct contact (touching the sick person and then touching your own eyes, nose, or mouth).  If there is a lot of inflammation, air flow is restricted. The air passages may also go into spasm, especially if you have asthma. This causes wheezing and difficulty breathing even in people who do not have asthma.  Bronchitis usually lasts 7 to 14 days. The wheezing should improve with treatment during the first week. An inhaler is often prescribed to relax the air passages and stop wheezing. Antibiotics will be prescribed if your doctor thinks there is also a secondary bacterial infection.  Home care    If symptoms are severe, rest at home for the first 2 to 3 days. When you go back to your usual activities, don't let yourself get too tired.    Do not smoke. Also avoid being exposed to secondhand smoke.    You may use over-the-counter medicine to control fever or pain, unless another medicine was prescribed. Note: If you have chronic liver or kidney disease or have ever had a stomach ulcer or gastrointestinal bleeding, talk with your healthcare provider before using these  medicines. Also talk to your provider if you are taking medicine to prevent blood clots.) Aspirin should never be given to anyone younger than 18 years of age who is ill with a viral infection or fever. It may cause severe liver or brain damage.    Your appetite may be poor, so a light diet is fine. Avoid dehydration by drinking 6 to 8 glasses of fluids per day (such as water, soft drinks, sports drinks, juices, tea, or soup). Extra fluids will help loosen secretions in the nose and lungs.    Over-the-counter cough, cold, and sore-throat medicines will not shorten the length of the illness, but they may be helpful to reduce symptoms. (Note: Do not use decongestants if you have high blood pressure.)    If you were given an inhaler, use it exactly as directed. If you need to use it more often than prescribed, your condition may be worsening. If this happens, contact your healthcare provider.    If prescribed, finish all antibiotic medicine, even if you are feeling better after only a few days.  Follow-up care  Follow up with your healthcare provider, or as advised. If you had an X-ray or ECG (electrocardiogram), a specialist will review it. You will be notified of any new findings that may affect your care.  Note: If you are age 65 or older, or if you have a chronic lung disease or condition that affects your immune system, or you smoke, talk to your healthcare provider about having a pneumococcal vaccinations and a yearly influenza vaccination (flu shot).  When to seek medical advice  Call your healthcare provider right away if any of these occur:    Fever of 100.4 F (38 C) or higher    Coughing up increasing amounts of colored sputum    Weakness, drowsiness, headache, facial pain, ear pain, or a stiff neck  Call 911, or get immediate medical care  Contact emergency services right away if any of these occur.    Coughing up blood    Worsening weakness, drowsiness, headache, or stiff neck    Increased wheezing not  helped with medication, shortness of breath, or pain with breathing  Date Last Reviewed: 9/13/2015 2000-2017 [employer]. [address] [place] [address], [place] [address]. All rights reserved. This information is not intended as a substitute for professional medical care. Always follow your healthcare professional's instructions.    10/26/17 Urgent Care Visit Plan:     1. Start the Azithromycin (Z-Pack) to treat your bacterial bronchitis.     2.  Use your Albuterol MDI and/or your Albuterol nebs every 6 hours as needed to treat your asthma exacerbation.     3.  Follow-up with your primary care provider if your symptoms do not start improving after 2 days of antibiotics or sooner if your symptoms change or worsen.     4. Follow-up immediately if, unexpectedly, you develop any of the below:     Contact emergency services right away if any of these occur.    Coughing up blood    Worsening weakness, drowsiness, headache, or stiff neck    Increased wheezing not helped with medication, shortness of breath, or pain with breathing                Follow-ups after your visit        Your next 10 appointments already scheduled     Nov 09, 2017  5:15 PM CST   PHYSICAL with Susan Rachele Haase, APRN [facility]    [address] [place] [id number] [phone]              Who to contact     If you have questions or need follow up information about today's clinic visit or your schedule please contact [facility] directly at [phone].  Normal or non-critical lab and imaging results will be communicated to you by MyChart, letter or phone within 4 business days after the clinic has received the results. If you do not hear from us within 7 days, please contact the clinic through [employer] or phone. If you have a critical or abnormal lab result, we will notify you by phone as soon as possible.  Submit refill requests through [employer]  "or call your pharmacy and they will forward the refill request to us. Please allow 3 business days for your refill to be completed.          Additional Information About Your Visit        MyChart Information     [employer] lets you send messages to your doctor, view your test results, renew your prescriptions, schedule appointments and more. To sign up, go to [email or web address] . Click on \"Log in\" on the left side of the screen, which will take you to the Welcome page. Then click on \"Sign up Now\" on the right side of the page.     You will be asked to enter the access code listed below, as well as some personal information. Please follow the directions to create your username and password.     Your access code is: [email or web address]  Expires: 2018 10:43 AM     Your access code will  in 90 days. If you need help or a new code, please call your [facility] clinic or [phone].        Care EveryWhere ID     This is your Care EveryWhere ID. This could be used by other organizations to access your [facility] medical records  [id number]        Your Vitals Were     Pulse Temperature Pulse Oximetry BMI (Body Mass Index)          85 98.4  F (36.9  C) (Oral) 98% 25.74 kg/m2         Blood Pressure from Last 3 Encounters:   10/26/17 (!) 132/91   17 118/60   17 110/64    Weight from Last 3 Encounters:   10/26/17 143 lb (64.9 kg)   17 143 lb (64.9 kg)   17 143 lb (64.9 kg)              We Performed the Following     INHALATION/NEBULIZER TREATMENT, INITIAL          Today's Medication Changes          These changes are accurate as of: 10/26/17 10:43 AM.  If you have any questions, ask your nurse or doctor.               Start taking these medicines.        Dose/Directions    azithromycin 250 MG tablet   Commonly known as:  ZITHROMAX   Used for:  Acute bronchitis with symptoms > 10 days   Started by:  Angel Bansal PA-C        Two tablets first day, then one tablet daily for four " days.   Quantity:  6 tablet   Refills:  0       ipratropium - albuterol 0.5 mg/2.5 mg/3 mL 0.5-2.5 (3) MG/3ML neb solution   Commonly known as:  DUONEB   Used for:  Acute bronchospasm   Started by:  Angel Bansal PA-C        Dose:  1 vial   Take 1 vial (3 mLs) by nebulization once for 1 dose   Quantity:  3 mL   Refills:  0         These medicines have changed or have updated prescriptions.        Dose/Directions    * albuterol (2.5 MG/3ML) 0.083% neb solution   This may have changed:  Another medication with the same name was added. Make sure you understand how and when to take each.   Used for:  Cough   Changed by:  Aaseby-Aguilera, Ramona Ann, PA-C        Dose:  1 vial   Take 1 vial (2.5 mg) by nebulization every 6 hours as needed for shortness of breath / dyspnea or wheezing   Quantity:  75 mL   Refills:  0       * albuterol 108 (90 BASE) MCG/ACT Inhaler   Commonly known as:  PROAIR HFA/PROVENTIL HFA/VENTOLIN HFA   This may have changed:  Another medication with the same name was added. Make sure you understand how and when to take each.   Used for:  Mild intermittent asthma without complication   Changed by:  Haase, Susan Rachele, APRN CNP        Dose:  2 puff   Inhale 2 puffs into the lungs every 6 hours as needed for shortness of breath / dyspnea or wheezing   Quantity:  1 Inhaler   Refills:  11       * albuterol (2.5 MG/3ML) 0.083% neb solution   This may have changed:  You were already taking a medication with the same name, and this prescription was added. Make sure you understand how and when to take each.   Used for:  Exacerbation of asthma, unspecified asthma severity, unspecified whether persistent   Changed by:  Angel Bansal PA-C        Dose:  1 vial   Take 1 vial (2.5 mg) by nebulization every 6 hours as needed for shortness of breath / dyspnea or wheezing   Quantity:  25 vial   Refills:  0       * Notice:  This list has 3 medication(s) that are the same as  other medications prescribed for you. Read the directions carefully, and ask your doctor or other care provider to review them with you.         Where to get your medicines      These medications were sent to [facility] - [address]   [address], [place] [address]     Phone:  [phone]     albuterol (2.5 MG/3ML) 0.083% neb solution    azithromycin 250 MG tablet    ipratropium - albuterol 0.5 mg/2.5 mg/3 mL 0.5-2.5 (3) MG/3ML neb solution                Primary Care Provider Office Phone # Fax #    Susan Rachele Haase, APRN [phone] [address] [facility] [address]        Equal Access to Services     WILFREDO AVERY : Hadii aad ku hadasho Sotory, waaxda luqadaha, qaybta kaalmada adeegyada, chago ramirez . So [facility] [phone].    ATENCIÓN: Si habla español, tiene a crockett disposición servicios gratuitos de asistencia lingüística. Llame al [phone].    We comply with applicable federal civil rights laws and [place] laws. We do not discriminate on the basis of race, color, national origin, age, disability, sex, sexual orientation, or gender identity.            Thank you!     Thank you for choosing [facility]  for your care. Our goal is always to provide you with excellent care. Hearing back from our patients is one way we can continue to improve our services. Please take a few minutes to complete the written survey that you may receive in the mail after your visit with us. Thank you!             Your Updated Medication List - Protect others around you: Learn how to safely use, store and throw away your medicines at www.disposemymeds.org.          This list is accurate as of: 10/26/17 10:43 AM.  Always use your most recent med list.                   Brand Name Dispense Instructions for use Diagnosis    * albuterol (2.5 MG/3ML) 0.083% neb solution     75 mL    Take 1 vial  (2.5 mg) by nebulization every 6 hours as needed for shortness of breath / dyspnea or wheezing    Cough       * albuterol 108 (90 BASE) MCG/ACT Inhaler    PROAIR HFA/PROVENTIL HFA/VENTOLIN HFA    1 Inhaler    Inhale 2 puffs into the lungs every 6 hours as needed for shortness of breath / dyspnea or wheezing    Mild intermittent asthma without complication       * albuterol (2.5 MG/3ML) 0.083% neb solution     25 vial    Take 1 vial (2.5 mg) by nebulization every 6 hours as needed for shortness of breath / dyspnea or wheezing    Exacerbation of asthma, unspecified asthma severity, unspecified whether persistent       azithromycin 250 MG tablet    ZITHROMAX    6 tablet    Two tablets first day, then one tablet daily for four days.    Acute bronchitis with symptoms > 10 days       ipratropium - albuterol 0.5 mg/2.5 mg/3 mL 0.5-2.5 (3) MG/3ML neb solution    DUONEB    3 mL    Take 1 vial (3 mLs) by nebulization once for 1 dose    Acute bronchospasm       ondansetron 4 MG tablet    ZOFRAN    12 tablet    Take 1 tablet (4 mg) by mouth every 8 hours as needed for nausea    Nausea, Abdominal pain, left lower quadrant       sertraline 100 MG tablet    ZOLOFT    90 tablet    Take 1 tablet (100 mg) by mouth daily    Anxiety       * Notice:  This list has 3 medication(s) that are the same as other medications prescribed for you. Read the directions carefully, and ask your doctor or other care provider to review them with you.

## 2017-10-26 NOTE — NURSING NOTE
"Chief Complaint   Patient presents with     Urgent Care     Cough     Pt states has had cough, green drainage, headache, pain in back, body aches x 2 weeks. Pt has not taken any otc medications.        Initial BP (!) 132/91 (BP Location: Right arm, Patient Position: Chair, Cuff Size: Adult Regular)  Pulse 85  Temp 98.4  F (36.9  C) (Oral)  Wt 143 lb (64.9 kg)  SpO2 98%  BMI 25.74 kg/m2 Estimated body mass index is 25.74 kg/(m^2) as calculated from the following:    Height as of 5/16/17: 5' 2.5\" (1.588 m).    Weight as of this encounter: 143 lb (64.9 kg).  Medication Reconciliation: unable or not appropriate to perform   Alayna Hedrick CMA (AAMA) 10/26/2017 10:03 AM    "

## 2017-11-09 ENCOUNTER — OFFICE VISIT (OUTPATIENT)
Dept: FAMILY MEDICINE | Facility: CLINIC | Age: 32
End: 2017-11-09
Payer: COMMERCIAL

## 2017-11-09 VITALS
HEIGHT: 63 IN | TEMPERATURE: 99.7 F | SYSTOLIC BLOOD PRESSURE: 124 MMHG | OXYGEN SATURATION: 98 % | HEART RATE: 111 BPM | DIASTOLIC BLOOD PRESSURE: 72 MMHG | BODY MASS INDEX: 26.03 KG/M2 | WEIGHT: 146.9 LBS | RESPIRATION RATE: 16 BRPM

## 2017-11-09 DIAGNOSIS — K04.7 TOOTH INFECTION: ICD-10-CM

## 2017-11-09 DIAGNOSIS — J45.20 MILD INTERMITTENT ASTHMA WITHOUT COMPLICATION: Primary | ICD-10-CM

## 2017-11-09 DIAGNOSIS — F41.9 ANXIETY: ICD-10-CM

## 2017-11-09 PROBLEM — Z32.01 PREGNANCY TEST POSITIVE: Status: RESOLVED | Noted: 2017-07-17 | Resolved: 2017-11-09

## 2017-11-09 PROCEDURE — 99214 OFFICE O/P EST MOD 30 MIN: CPT | Performed by: NURSE PRACTITIONER

## 2017-11-09 RX ORDER — BENZONATATE 200 MG/1
200 CAPSULE ORAL 3 TIMES DAILY PRN
Qty: 21 CAPSULE | Refills: 0 | Status: SHIPPED | OUTPATIENT
Start: 2017-11-09 | End: 2017-12-23

## 2017-11-09 RX ORDER — HYDROXYZINE HYDROCHLORIDE 25 MG/1
25-50 TABLET, FILM COATED ORAL EVERY 6 HOURS PRN
Qty: 60 TABLET | Refills: 1 | Status: SHIPPED | OUTPATIENT
Start: 2017-11-09 | End: 2018-04-12

## 2017-11-09 RX ORDER — SERTRALINE HYDROCHLORIDE 100 MG/1
100 TABLET, FILM COATED ORAL DAILY
Qty: 90 TABLET | Refills: 1 | Status: SHIPPED | OUTPATIENT
Start: 2017-11-09 | End: 2018-04-12

## 2017-11-09 RX ORDER — CLINDAMYCIN HCL 300 MG
300 CAPSULE ORAL 4 TIMES DAILY
Qty: 40 CAPSULE | Refills: 0 | Status: SHIPPED | OUTPATIENT
Start: 2017-11-09 | End: 2017-12-23

## 2017-11-09 ASSESSMENT — ANXIETY QUESTIONNAIRES
5. BEING SO RESTLESS THAT IT IS HARD TO SIT STILL: SEVERAL DAYS
7. FEELING AFRAID AS IF SOMETHING AWFUL MIGHT HAPPEN: NEARLY EVERY DAY
1. FEELING NERVOUS, ANXIOUS, OR ON EDGE: NEARLY EVERY DAY
7. FEELING AFRAID AS IF SOMETHING AWFUL MIGHT HAPPEN: NEARLY EVERY DAY
GAD7 TOTAL SCORE: 17
3. WORRYING TOO MUCH ABOUT DIFFERENT THINGS: NEARLY EVERY DAY
2. NOT BEING ABLE TO STOP OR CONTROL WORRYING: NEARLY EVERY DAY
4. TROUBLE RELAXING: SEVERAL DAYS
GAD7 TOTAL SCORE: 17
6. BECOMING EASILY ANNOYED OR IRRITABLE: NEARLY EVERY DAY
GAD7 TOTAL SCORE: 17

## 2017-11-09 ASSESSMENT — PATIENT HEALTH QUESTIONNAIRE - PHQ9
10. IF YOU CHECKED OFF ANY PROBLEMS, HOW DIFFICULT HAVE THESE PROBLEMS MADE IT FOR YOU TO DO YOUR WORK, TAKE CARE OF THINGS AT HOME, OR GET ALONG WITH OTHER PEOPLE: SOMEWHAT DIFFICULT
SUM OF ALL RESPONSES TO PHQ QUESTIONS 1-9: 14
SUM OF ALL RESPONSES TO PHQ QUESTIONS 1-9: 14

## 2017-11-09 NOTE — MR AVS SNAPSHOT
After Visit Summary   11/9/2017    Jonelle Barba    MRN: [id number]           Patient Information     Date Of Birth          1985        Visit Information        Provider Department      11/9/2017 5:15 PM Haase, Susan Rachele, APRN [facility]        Today's Diagnoses     Mild intermittent asthma without complication    -  1    Anxiety        Tooth infection          Care Instructions      Preventive Health Recommendations  Female Ages 26 - 39  Yearly exam:   See your health care provider every year in order to    Review health changes.     Discuss preventive care.      Review your medicines if you your doctor has prescribed any.    Until age 30: Get a Pap test every three years (more often if you have had an abnormal result).    After age 30: Talk to your doctor about whether you should have a Pap test every 3 years or have a Pap test with HPV screening every 5 years.   You do not need a Pap test if your uterus was removed (hysterectomy) and you have not had cancer.  You should be tested each year for STDs (sexually transmitted diseases), if you're at risk.   Talk to your provider about how often to have your cholesterol checked.  If you are at risk for diabetes, you should have a diabetes test (fasting glucose).  Shots: Get a flu shot each year. Get a tetanus shot every 10 years.   Nutrition:     Eat at least 5 servings of fruits and vegetables each day.    Eat whole-grain bread, whole-wheat pasta and brown rice instead of white grains and rice.    Talk to your provider about Calcium and Vitamin D.     Lifestyle    Exercise at least 150 minutes a week (30 minutes a day, 5 days of the week). This will help you control your weight and prevent disease.    Limit alcohol to one drink per day.    No smoking.     Wear sunscreen to prevent skin cancer.    See your dentist every six months for an exam and cleaning.            Follow-ups after your visit        Follow-up notes  "from your care team     Return in about 1 week (around 2017).      Who to contact     If you have questions or need follow up information about today's clinic visit or your schedule please contact [facility] directly at [phone].  Normal or non-critical lab and imaging results will be communicated to you by MyChart, letter or phone within 4 business days after the clinic has received the results. If you do not hear from us within 7 days, please contact the clinic through MyChart or phone. If you have a critical or abnormal lab result, we will notify you by phone as soon as possible.  Submit refill requests through [employer] or call your pharmacy and they will forward the refill request to us. Please allow 3 business days for your refill to be completed.          Additional Information About Your Visit        MyChart Information     [employer] lets you send messages to your doctor, view your test results, renew your prescriptions, schedule appointments and more. To sign up, go to [email or web address] . Click on \"Log in\" on the left side of the screen, which will take you to the Welcome page. Then click on \"Sign up Now\" on the right side of the page.     You will be asked to enter the access code listed below, as well as some personal information. Please follow the directions to create your username and password.     Your access code is: [email or web address]  Expires: 2018  9:43 AM     Your access code will  in 90 days. If you need help or a new code, please call your [facility] or [phone].        Care EveryWhere ID     This is your Care EveryWhere ID. This could be used by other organizations to access your [place] medical records  [id number]        Your Vitals Were     Pulse Temperature Respirations Height Last Period Pulse Oximetry    111 99.7  F (37.6  C) (Oral) 16 5' 2.5\" (1.588 m) 2017 98%    BMI (Body Mass Index)                   26.44 kg/m2            Blood " Pressure from Last 3 Encounters:   11/09/17 124/72   10/26/17 (!) 132/91   07/20/17 118/60    Weight from Last 3 Encounters:   11/09/17 146 lb 14.4 oz (66.6 kg)   10/26/17 143 lb (64.9 kg)   07/20/17 143 lb (64.9 kg)              We Performed the Following     DEPRESSION ACTION PLAN (DAP)          Today's Medication Changes          These changes are accurate as of: 11/9/17  5:29 PM.  If you have any questions, ask your nurse or doctor.               Start taking these medicines.        Dose/Directions    benzonatate 200 MG capsule   Commonly known as:  TESSALON   Used for:  Mild intermittent asthma without complication   Started by:  Haase, Susan Rachele, APRN CNP        Dose:  200 mg   Take 1 capsule (200 mg) by mouth 3 times daily as needed for cough   Quantity:  21 capsule   Refills:  0       clindamycin 300 MG capsule   Commonly known as:  CLEOCIN   Used for:  Tooth infection   Started by:  Haase, Susan Rachele, APRN CNP        Dose:  300 mg   Take 1 capsule (300 mg) by mouth 4 times daily   Quantity:  40 capsule   Refills:  0       hydrOXYzine 25 MG tablet   Commonly known as:  ATARAX   Used for:  Anxiety   Started by:  Haase, Susan Rachele, APRN CNP        Dose:  25-50 mg   Take 1-2 tablets (25-50 mg) by mouth every 6 hours as needed for anxiety   Quantity:  60 tablet   Refills:  1            Where to get your medicines      These medications were sent to [address]  [id number] [place] [address]     Phone:  [phone]     benzonatate 200 MG capsule    clindamycin 300 MG capsule    hydrOXYzine 25 MG tablet                Primary Care Provider Office Phone # Fax #    Susan Rachele Haase, APRN [phone] [address] [facility] [address]        Equal Access to Services     WILFREDO AVERY AH: Pato Gunn, christopher luflo, qaybta kaalmafred salamanca, chago paniagua. So [facility]  [phone].    ATENCIÓN: Si zoe roper, tiene a crockett disposición servicios gratuitos de asistencia lingüística. Cesar estrada [phone].    We comply with applicable federal civil rights laws and [place] laws. We do not discriminate on the basis of race, color, national origin, age, disability, sex, sexual orientation, or gender identity.            Thank you!     Thank you for choosing [facility]  for your care. Our goal is always to provide you with excellent care. Hearing back from our patients is one way we can continue to improve our services. Please take a few minutes to complete the written survey that you may receive in the mail after your visit with us. Thank you!             Your Updated Medication List - Protect others around you: Learn how to safely use, store and throw away your medicines at www.disposemymeds.org.          This list is accurate as of: 11/9/17  5:29 PM.  Always use your most recent med list.                   Brand Name Dispense Instructions for use Diagnosis    * albuterol (2.5 MG/3ML) 0.083% neb solution     75 mL    Take 1 vial (2.5 mg) by nebulization every 6 hours as needed for shortness of breath / dyspnea or wheezing    Cough       * albuterol 108 (90 BASE) MCG/ACT Inhaler    PROAIR HFA/PROVENTIL HFA/VENTOLIN HFA    1 Inhaler    Inhale 2 puffs into the lungs every 6 hours as needed for shortness of breath / dyspnea or wheezing    Mild intermittent asthma without complication       * albuterol (2.5 MG/3ML) 0.083% neb solution     25 vial    Take 1 vial (2.5 mg) by nebulization every 6 hours as needed for shortness of breath / dyspnea or wheezing    Exacerbation of asthma, unspecified asthma severity, unspecified whether persistent       azithromycin 250 MG tablet    ZITHROMAX    6 tablet    Two tablets first day, then one tablet daily for four days.    Acute bronchitis with symptoms > 10 days       benzonatate 200 MG capsule    TESSALON    21 capsule    Take 1  capsule (200 mg) by mouth 3 times daily as needed for cough    Mild intermittent asthma without complication       clindamycin 300 MG capsule    CLEOCIN    40 capsule    Take 1 capsule (300 mg) by mouth 4 times daily    Tooth infection       hydrOXYzine 25 MG tablet    ATARAX    60 tablet    Take 1-2 tablets (25-50 mg) by mouth every 6 hours as needed for anxiety    Anxiety       ipratropium - albuterol 0.5 mg/2.5 mg/3 mL 0.5-2.5 (3) MG/3ML neb solution    DUONEB    3 mL    Take 1 vial (3 mLs) by nebulization once for 1 dose    Acute bronchospasm       ondansetron 4 MG tablet    ZOFRAN    12 tablet    Take 1 tablet (4 mg) by mouth every 8 hours as needed for nausea    Nausea, Abdominal pain, left lower quadrant       sertraline 100 MG tablet    ZOLOFT    90 tablet    Take 1 tablet (100 mg) by mouth daily    Anxiety       * Notice:  This list has 3 medication(s) that are the same as other medications prescribed for you. Read the directions carefully, and ask your doctor or other care provider to review them with you.

## 2017-11-09 NOTE — PROGRESS NOTES
SUBJECTIVE:   Jonelle Barba is a 32 year old female who presents to clinic today for the following health issues:    Upper respiratory    HPI  RESPIRATORY SYMPTOMS      Duration: 3 weeks    Description  nasal congestion, rhinorrhea and cough    Severity: severe    Accompanying signs and symptoms: None    History (predisposing factors):  tobacco abuse    Precipitating or alleviating factors: None    Therapies tried and outcome:  Antibiotics without relief.  Continues to have frequent cough, non productive along with body aches.    Smokes 2-3 cigg per day.  Using her inhaler every morning and nebulizer at night.   She can't use her CPAP because of her cough. She has not been sleeping well.    Depression and Anxiety Follow-Up    Status since last visit: Worsened     Other associated symptoms:None    Complicating factors:     Significant life event: Yes-  Miscarriage     Current substance abuse: tobacco    PHQ-9 Score and MyChart F/U Questions 9/15/2016 5/16/2017 11/9/2017   Total Score 9 6 14   Q9: Suicide Ideation Not at all Not at all Not at all     LUIS FELIPE-7 SCORE 9/15/2016 5/16/2017 11/9/2017   Total Score - - -   Total Score - - 17 (severe anxiety)   Total Score 6 2 17     Jonelle had a miscarriage in September and has been having a really hard time since. Between the miscarriage and being sick, she said she feels like she is on the verge of a mental breakdown. She complains of mood swings, and said she had a panic attack several weeks ago in front of her .    She is taking her Zoloft regularly, and she is not in counseling. She says this is because she has been very busy at work, and is on new insurance and isn't sure what is covered.  Denies thoughts of harming self or others.    Smoking: Jonelle has been smoking a few cigarettes/day. Her  has been urging her to quit but she says it has been the only way she can keep calm recently.    Dental: Jonelle recently found out she has an infection the  "root of a tooth in her lower left jaw. She needs to see an endodontist in the near future.     Problem list and histories reviewed & adjusted, as indicated.  Additional history: as documented    Reviewed and updated as needed this visit by clinical staff  Tobacco  Allergies  Med Hx  Surg Hx  Fam Hx  Soc Hx      Reviewed and updated as needed this visit by Provider       Review of Systems   Constitutional, HEENT, cardiovascular, pulmonary, GI, , musculoskeletal, neuro, skin, endocrine and psych systems are negative, except as otherwise noted.    This document serves as a record of the services and decisions personally performed and made by Susan Haase, CNP. It was created on her behalf by Anthony Cornell, a trained medical scribe. The creation of this document is based the provider's statements to the medical scribe.  Anthony Cornell November 9, 2017 5:36 PM      OBJECTIVE:   Physical Exam  /72 (BP Location: Right arm, Patient Position: Chair, Cuff Size: Adult Regular)  Pulse 111  Temp 99.7  F (37.6  C) (Oral)  Resp 16  Ht 1.588 m (5' 2.5\")  Wt 66.6 kg (146 lb 14.4 oz)  LMP 11/02/2017  SpO2 98%  BMI 26.44 kg/m2  Body mass index is 26.44 kg/(m^2).  GENERAL: healthy, alert  EYES: Eyes grossly normal to inspection, PERRL and conjunctivae and sclerae normal  HENT: ear canals and TM's normal, left lower jaw with a pocket of swelling along tooth #20, no trismus or facial swelling, tooth tender to percussion.  NECK: no adenopathy, no asymmetry, masses, or scars and thyroid normal to palpation  RESP: lungs clear to auscultation - no rales, rhonchi or wheezes,frequent cough during exam.  CV: regular rate and rhythm, normal S1 S2, no S3 or S4, no murmur, click or rub, no peripheral edema   PSYCH: mentation appears normal, affect tearful and crying during the visit.     Diagnostic Test Results:  No results found for this or any previous visit (from the past 24 hour(s)).    ASSESSMENT/PLAN:     Jonelle was seen " today for physical.    Diagnoses and all orders for this visit:    Mild intermittent asthma without complication:  Encouraged smoking cessation. Continue albuterol inhaler 2-3 times per day  -     benzonatate (TESSALON) 200 MG capsule; Take 1 capsule (200 mg) by mouth 3 times daily as needed for cough    Anxiety:  Continue zoloft 100 mg every day. Encouraged counseling due to recent miscarriage, will call [facility].    -     hydrOXYzine (ATARAX) 25 MG tablet; Take 1-2 tablets (25-50 mg) by mouth every 6 hours as needed for anxiety    Tooth infection:  Has follow up with endodontist. If swelling increases or develops fever instructed to follow up or go to the ED.  -     clindamycin (CLEOCIN) 300 MG capsule; Take 1 capsule (300 mg) by mouth 4 times daily      Follow up in 1 week, sooner as needed.     The information in this document, created by the medical scribe for me, accurately reflects the services I personally performed and the decisions made by me. I have reviewed and approved this document for accuracy.   Susan Haase, CNP Susan Haase, APRN CNP  [facility]

## 2017-11-10 ASSESSMENT — PATIENT HEALTH QUESTIONNAIRE - PHQ9: SUM OF ALL RESPONSES TO PHQ QUESTIONS 1-9: 14

## 2017-11-10 ASSESSMENT — ANXIETY QUESTIONNAIRES: GAD7 TOTAL SCORE: 17

## 2017-12-07 ENCOUNTER — OFFICE VISIT (OUTPATIENT)
Dept: URGENT CARE | Facility: URGENT CARE | Age: 32
End: 2017-12-07
Payer: COMMERCIAL

## 2017-12-07 VITALS
SYSTOLIC BLOOD PRESSURE: 120 MMHG | OXYGEN SATURATION: 99 % | TEMPERATURE: 98.5 F | DIASTOLIC BLOOD PRESSURE: 64 MMHG | HEART RATE: 69 BPM

## 2017-12-07 DIAGNOSIS — J02.9 ACUTE PHARYNGITIS, UNSPECIFIED ETIOLOGY: ICD-10-CM

## 2017-12-07 DIAGNOSIS — J02.9 VIRAL PHARYNGITIS: Primary | ICD-10-CM

## 2017-12-07 LAB
BASOPHILS # BLD AUTO: 0.1 10E9/L (ref 0–0.2)
BASOPHILS NFR BLD AUTO: 0.5 %
DEPRECATED S PYO AG THROAT QL EIA: NORMAL
DIFFERENTIAL METHOD BLD: NORMAL
EOSINOPHIL # BLD AUTO: 0.3 10E9/L (ref 0–0.7)
EOSINOPHIL NFR BLD AUTO: 2.6 %
ERYTHROCYTE [DISTWIDTH] IN BLOOD BY AUTOMATED COUNT: 12 % (ref 10–15)
HCT VFR BLD AUTO: 42.5 % (ref 35–47)
HETEROPH AB SER QL: NEGATIVE
HGB BLD-MCNC: 14.1 G/DL (ref 11.7–15.7)
LYMPHOCYTES # BLD AUTO: 2.7 10E9/L (ref 0.8–5.3)
LYMPHOCYTES NFR BLD AUTO: 26.6 %
MCH RBC QN AUTO: 30.9 PG (ref 26.5–33)
MCHC RBC AUTO-ENTMCNC: 33.2 G/DL (ref 31.5–36.5)
MCV RBC AUTO: 93 FL (ref 78–100)
MONOCYTES # BLD AUTO: 0.7 10E9/L (ref 0–1.3)
MONOCYTES NFR BLD AUTO: 6.9 %
NEUTROPHILS # BLD AUTO: 6.4 10E9/L (ref 1.6–8.3)
NEUTROPHILS NFR BLD AUTO: 63.4 %
PLATELET # BLD AUTO: 220 10E9/L (ref 150–450)
RBC # BLD AUTO: 4.56 10E12/L (ref 3.8–5.2)
SPECIMEN SOURCE: NORMAL
WBC # BLD AUTO: 10.1 10E9/L (ref 4–11)

## 2017-12-07 PROCEDURE — 86308 HETEROPHILE ANTIBODY SCREEN: CPT | Performed by: PHYSICIAN ASSISTANT

## 2017-12-07 PROCEDURE — 87880 STREP A ASSAY W/OPTIC: CPT | Performed by: PHYSICIAN ASSISTANT

## 2017-12-07 PROCEDURE — 36415 COLL VENOUS BLD VENIPUNCTURE: CPT | Performed by: PHYSICIAN ASSISTANT

## 2017-12-07 PROCEDURE — 85025 COMPLETE CBC W/AUTO DIFF WBC: CPT | Performed by: PHYSICIAN ASSISTANT

## 2017-12-07 PROCEDURE — 99213 OFFICE O/P EST LOW 20 MIN: CPT | Performed by: PHYSICIAN ASSISTANT

## 2017-12-07 PROCEDURE — 87081 CULTURE SCREEN ONLY: CPT | Performed by: PHYSICIAN ASSISTANT

## 2017-12-07 RX ORDER — DIPHENHYDRAMINE HYDROCHLORIDE AND LIDOCAINE HYDROCHLORIDE AND ALUMINUM HYDROXIDE AND MAGNESIUM HYDRO
5-10 KIT EVERY 6 HOURS PRN
Qty: 237 ML | Refills: 1 | Status: SHIPPED | OUTPATIENT
Start: 2017-12-07 | End: 2017-12-23

## 2017-12-07 NOTE — MR AVS SNAPSHOT
After Visit Summary   12/7/2017    Jonelle Khan    MRN: [id number]           Patient Information     Date Of Birth          1985        Visit Information        Provider Department      12/7/2017 2:45 PM Angel Bansal PA-C [facility]        Today's Diagnoses     Acute pharyngitis, unspecified etiology    -  1      Care Instructions      Viral Pharyngitis (Sore Throat)    You (or your child, if your child is the patient) have pharyngitis (sore throat). This infection is caused by a virus. It can cause throat pain that is worse when swallowing, aching all over, headache, and fever. The infection may be spread by coughing, kissing, or touching others after touching your mouth or nose. Antibiotic medications do not work against viruses, so they are not used for treating this condition.  Home care    If your symptoms are severe, rest at home. Return to work or school when you feel well enough.     Drink plenty of fluids to avoid dehydration.    For children: Use acetaminophen for fever, fussiness or discomfort. In infants over six months of age, you may use ibuprofen instead of acetaminophen. (NOTE: If your child has chronic liver or kidney disease or ever had a stomach ulcer or GI bleeding, talk with your doctor before using these medicines.) (NOTE: Aspirin should never be used in anyone under 18 years of age who is ill with a fever. It may cause severe liver damage.)     For adults: You may use acetaminophen or ibuprofen to control pain or fever, unless another medicine was prescribed for this. (NOTE: If you have chronic liver or kidney disease or ever had a stomach ulcer or GI bleeding, talk with your doctor before using these medicines.)    Throat lozenges or numbing throat sprays can help reduce pain. Gargling with warm salt water will also help reduce throat pain. For this, dissolve 1/2 teaspoon of salt in 1 glass of warm water. To help soothe a sore  throat, children can sip on juice or a popsicle. Children 5 years and older can also suck on a lollipop or hard candy.    Avoid salty or spicy foods, which can be irritating to the throat.  Follow-up care  Follow up with your healthcare provider or our staff if you are not improving over the next week.  When to seek medical advice  Call your healthcare provider right away if any of these occur:    Fever as directed by your doctor.  For children, seek care if:    Your child is of any age and has repeated fevers above 104 F (40 C).    Your child is younger than 2 years of age and has a fever of 100.4 F (38 C) that continues for more than 1 day.    Your child is 2 years old or older and has a fever of 100.4 F (38 C) that continues for more than 3 days.    New or worsening ear pain, sinus pain, or headache    Painful lumps in the back of neck    Stiff neck    Lymph nodes are getting larger    Inability to swallow liquids, excessive drooling, or inability to open mouth wide due to throat pain    Signs of dehydration (very dark urine or no urine, sunken eyes, dizziness)    Trouble breathing or noisy breathing    Muffled voice    New rash    Child appears to be getting sicker  Date Last Reviewed: 4/13/2015 2000-2017 The "[employer]. [address] [place] [address]. All rights reserved. This information is not intended as a substitute for professional medical care. Always follow your healthcare professional's instructions.                Follow-ups after your visit        Your next 10 appointments already scheduled     Dec 23, 2017 11:00 AM CST   PHYSICAL with Susan Rachele Haase, APRN CNP   [facility]    [address] [place] [id number] [phone]              Who to contact     If you have questions or need follow up information about today's clinic visit or your schedule please contact [facility] directly at  "[phone].  Normal or non-critical lab and imaging results will be communicated to you by [employer], letter or phone within 4 business days after the clinic has received the results. If you do not hear from us within 7 days, please contact the clinic through [employer] or phone. If you have a critical or abnormal lab result, we will notify you by phone as soon as possible.  Submit refill requests through "[employer] or call your pharmacy and they will forward the refill request to us. Please allow 3 business days for your refill to be completed.          Additional Information About Your Visit        [employer] Information     "[employer] lets you send messages to your doctor, view your test results, renew your prescriptions, schedule appointments and more. To sign up, go to [email or web address] . Click on \"Log in\" on the left side of the screen, which will take you to the Welcome page. Then click on \"Sign up Now\" on the right side of the page.     You will be asked to enter the access code listed below, as well as some personal information. Please follow the directions to create your username and password.     Your access code is: [email or web address]  Expires: 2018  9:43 AM     Your access code will  in 90 days. If you need help or a new code, please call your [place] clinic or [phone].        Care EveryWhere ID     This is your Care EveryWhere ID. This could be used by other organizations to access your [place] medical records  [id number]        Your Vitals Were     Pulse Temperature Last Period Pulse Oximetry          69 98.5  F (36.9  C) (Oral) 2017 99%         Blood Pressure from Last 3 Encounters:   17 120/64   17 124/72   10/26/17 (!) 132/91    Weight from Last 3 Encounters:   17 146 lb 14.4 oz (66.6 kg)   10/26/17 143 lb (64.9 kg)   17 143 lb (64.9 kg)              We Performed the Following     Beta strep group A culture     CBC with platelets differential     Mononucleosis " screen     Strep, Rapid Screen          Today's Medication Changes          These changes are accurate as of: 12/7/17  3:54 PM.  If you have any questions, ask your nurse or doctor.               Start taking these medicines.        Dose/Directions    FIRST-MOUTHWASH BLM MT Susp compounding kit   Used for:  Acute pharyngitis, unspecified etiology   Started by:  Angel Bansal PA-C        Dose:  5-10 mL   Swish and swallow 5-10 mLs in mouth every 6 hours as needed for mouth sores   Quantity:  237 mL   Refills:  1            Where to get your medicines      These medications were sent to [facility] - [address]   [address]  [address], [place] [address]     Phone:  [phone]     FIRST-MOUTHWASH BLM MT Susp compounding kit                Primary Care Provider Office Phone # Fax #    Swapna Maria Alejandra Harrisonase, APRN [phone] [address] [address] [facility] [address]        Equal Access to Services     [facility] AH: Hadii aad ku hadasho Soomaali, waaxda luqadaha, qaybta kaalmada adeegyada, waxay idiin hayaan adeeg kharalydia ramirez . So [facility] [phone].    ATENCIÓN: Si habla español, tiene a crockett disposición servicios gratuitos de asistencia lingüística. Ll[facility] [phone].    We comply with applicable federal civil rights laws and [place] laws. We do not discriminate on the basis of race, color, national origin, age, disability, sex, sexual orientation, or gender identity.            Thank you!     Thank you for choosing [facility]  for your care. Our goal is always to provide you with excellent care. Hearing back from our patients is one way we can continue to improve our services. Please take a few minutes to complete the written survey that you may receive in the mail after your visit with us. Thank you!             Your Updated Medication List - Protect others around you: Learn how to safely use, store  and throw away your medicines at www.disposemymeds.org.          This list is accurate as of: 12/7/17  3:54 PM.  Always use your most recent med list.                   Brand Name Dispense Instructions for use Diagnosis    * albuterol (2.5 MG/3ML) 0.083% neb solution     75 mL    Take 1 vial (2.5 mg) by nebulization every 6 hours as needed for shortness of breath / dyspnea or wheezing    Cough       * albuterol 108 (90 BASE) MCG/ACT Inhaler    PROAIR HFA/PROVENTIL HFA/VENTOLIN HFA    1 Inhaler    Inhale 2 puffs into the lungs every 6 hours as needed for shortness of breath / dyspnea or wheezing    Mild intermittent asthma without complication       * albuterol (2.5 MG/3ML) 0.083% neb solution     25 vial    Take 1 vial (2.5 mg) by nebulization every 6 hours as needed for shortness of breath / dyspnea or wheezing    Exacerbation of asthma, unspecified asthma severity, unspecified whether persistent       benzonatate 200 MG capsule    TESSALON    21 capsule    Take 1 capsule (200 mg) by mouth 3 times daily as needed for cough    Mild intermittent asthma without complication       clindamycin 300 MG capsule    CLEOCIN    40 capsule    Take 1 capsule (300 mg) by mouth 4 times daily    Tooth infection       FIRST-MOUTHWASH BLM MT Susp compounding kit     237 mL    Swish and swallow 5-10 mLs in mouth every 6 hours as needed for mouth sores    Acute pharyngitis, unspecified etiology       hydrOXYzine 25 MG tablet    ATARAX    60 tablet    Take 1-2 tablets (25-50 mg) by mouth every 6 hours as needed for anxiety    Anxiety       ipratropium - albuterol 0.5 mg/2.5 mg/3 mL 0.5-2.5 (3) MG/3ML neb solution    DUONEB    3 mL    Take 1 vial (3 mLs) by nebulization once for 1 dose    Acute bronchospasm       ondansetron 4 MG tablet    ZOFRAN    12 tablet    Take 1 tablet (4 mg) by mouth every 8 hours as needed for nausea    Nausea, Abdominal pain, left lower quadrant       sertraline 100 MG tablet    ZOLOFT    90 tablet    Take 1  tablet (100 mg) by mouth daily    Anxiety       * Notice:  This list has 3 medication(s) that are the same as other medications prescribed for you. Read the directions carefully, and ask your doctor or other care provider to review them with you.

## 2017-12-07 NOTE — PATIENT INSTRUCTIONS
Viral Pharyngitis (Sore Throat)    You (or your child, if your child is the patient) have pharyngitis (sore throat). This infection is caused by a virus. It can cause throat pain that is worse when swallowing, aching all over, headache, and fever. The infection may be spread by coughing, kissing, or touching others after touching your mouth or nose. Antibiotic medications do not work against viruses, so they are not used for treating this condition.  Home care    If your symptoms are severe, rest at home. Return to work or school when you feel well enough.     Drink plenty of fluids to avoid dehydration.    For children: Use acetaminophen for fever, fussiness or discomfort. In infants over six months of age, you may use ibuprofen instead of acetaminophen. (NOTE: If your child has chronic liver or kidney disease or ever had a stomach ulcer or GI bleeding, talk with your doctor before using these medicines.) (NOTE: Aspirin should never be used in anyone under 18 years of age who is ill with a fever. It may cause severe liver damage.)     For adults: You may use acetaminophen or ibuprofen to control pain or fever, unless another medicine was prescribed for this. (NOTE: If you have chronic liver or kidney disease or ever had a stomach ulcer or GI bleeding, talk with your doctor before using these medicines.)    Throat lozenges or numbing throat sprays can help reduce pain. Gargling with warm salt water will also help reduce throat pain. For this, dissolve 1/2 teaspoon of salt in 1 glass of warm water. To help soothe a sore throat, children can sip on juice or a popsicle. Children 5 years and older can also suck on a lollipop or hard candy.    Avoid salty or spicy foods, which can be irritating to the throat.  Follow-up care  Follow up with your healthcare provider or our staff if you are not improving over the next week.  When to seek medical advice  Call your healthcare provider right away if any of these  occur:    Fever as directed by your doctor.  For children, seek care if:    Your child is of any age and has repeated fevers above 104 F (40 C).    Your child is younger than 2 years of age and has a fever of 100.4 F (38 C) that continues for more than 1 day.    Your child is 2 years old or older and has a fever of 100.4 F (38 C) that continues for more than 3 days.    New or worsening ear pain, sinus pain, or headache    Painful lumps in the back of neck    Stiff neck    Lymph nodes are getting larger    Inability to swallow liquids, excessive drooling, or inability to open mouth wide due to throat pain    Signs of dehydration (very dark urine or no urine, sunken eyes, dizziness)    Trouble breathing or noisy breathing    Muffled voice    New rash    Child appears to be getting sicker  Date Last Reviewed: 4/13/2015 2000-2017 [employer]. [address] [place] [address], [place] [address]. All rights reserved. This information is not intended as a substitute for professional medical care. Always follow your healthcare professional's instructions.

## 2017-12-07 NOTE — PROGRESS NOTES
Jonelle Khan presents to  today for evaluation of ST x 5-6  day duration. No fever.     Despite above acute illness sxs, patient still alert, active and taking in soft PO solids and good PO fluids.  Normal BM's and urination.       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 No household Strep or Mono exposure.     ROS:     HEENT: Positive ST as per above. Positive mild nasal congestion.   RESP: No cough, wheezing or SOB   GI: Denies any N/V/D. No abdominal pain. Normal BM's  SKIN: Denies rash  NEURO: Negative for HA, neck stiffness, mental status changes or lethargy.     Social History     Social History     Marital status:      Spouse name: N/A     Number of children: N/A     Years of education: N/A     Occupational History     Not on file.     Social History Main Topics     Smoking status: Light Tobacco Smoker     Types: Cigarettes     Smokeless tobacco: Never Used      Comment: occasional     Alcohol use 0.0 oz/week     0 Standard drinks or equivalent per week      Comment: 1-2 a month     Drug use: No     Sexual activity: Yes     Partners: Male     Birth control/ protection: Condom     Other Topics Concern      Service No     Blood Transfusions No     Caffeine Concern No     Occupational Exposure No     Hobby Hazards No     Sleep Concern Yes     Stress Concern Yes     Weight Concern Yes     Special Diet No     Back Care No     Exercise Yes     Bike Helmet No     Seat Belt Yes     Self-Exams No     Parent/Sibling W/ Cabg, Mi Or Angioplasty Before 65f 55m? No     Social History Narrative       Past Medical History:   Diagnosis Date     Anxiety      Asthma      Kidney infection 2010     LSIL (low grade squamous intraepithelial lesion) on Pap smear 7/20/10    resolved     Mild major depression (H)      Sleep apnea        Current Outpatient Prescriptions   Medication     magic mouthwash suspension (diphenhydramine, lidocaine, aluminum-magnesium & simethicone)     sertraline (ZOLOFT) 100  MG tablet     ondansetron (ZOFRAN) 4 MG tablet     benzonatate (TESSALON) 200 MG capsule     hydrOXYzine (ATARAX) 25 MG tablet     clindamycin (CLEOCIN) 300 MG capsule     ipratropium - albuterol 0.5 mg/2.5 mg/3 mL (DUONEB) 0.5-2.5 (3) MG/3ML neb solution     albuterol (2.5 MG/3ML) 0.083% neb solution     albuterol (PROAIR HFA/PROVENTIL HFA/VENTOLIN HFA) 108 (90 BASE) MCG/ACT Inhaler     albuterol (2.5 MG/3ML) 0.083% neb solution     No current facility-administered medications for this visit.          Allergies   Allergen Reactions     Ceclor [Cefaclor] Hives           OBJECTIVE:  /64 (Cuff Size: Adult Regular)  Pulse 69  Temp 98.5  F (36.9  C) (Oral)  LMP 11/02/2017  SpO2 99%      General appearance: alert and no apparent distress  Skin color is pink and without rash.  HEENT:   Conjunctiva not injected.  Sclera clear.  Left TM is normal: no effusions, no erythema, and normal landmarks.  Right TM is normal: no effusions, no erythema, and normal landmarks.  Nasal mucosa is congested   Oropharyngeal exam is positive for mild, diffuse, erythema.  No plaque, exudate, lesions, or ulcers.   Neck is supple, FROM with no adenopathy  CARDIAC:NORMAL - regular rate and rhythm without murmur.  RESP: Normal - CTA without rales, rhonchi, or wheezing.  NEURO: Alert and oriented.  Normal speech and mentation.  CN II/XII grossly intact.  Gait within normal limits.        LAB:   Results for orders placed or performed in visit on 12/07/17   CBC with platelets differential   Result Value Ref Range    WBC 10.1 4.0 - 11.0 10e9/L    RBC Count 4.56 3.8 - 5.2 10e12/L    Hemoglobin 14.1 11.7 - 15.7 g/dL    Hematocrit 42.5 35.0 - 47.0 %    MCV 93 78 - 100 fl    MCH 30.9 26.5 - 33.0 pg    MCHC 33.2 31.5 - 36.5 g/dL    RDW 12.0 10.0 - 15.0 %    Platelet Count 220 150 - 450 10e9/L    Diff Method Automated Method     % Neutrophils 63.4 %    % Lymphocytes 26.6 %    % Monocytes 6.9 %    % Eosinophils 2.6 %    % Basophils 0.5 %     "Absolute Neutrophil 6.4 1.6 - 8.3 10e9/L    Absolute Lymphocytes 2.7 0.8 - 5.3 10e9/L    Absolute Monocytes 0.7 0.0 - 1.3 10e9/L    Absolute Eosinophils 0.3 0.0 - 0.7 10e9/L    Absolute Basophils 0.1 0.0 - 0.2 10e9/L   Mononucleosis screen   Result Value Ref Range    Mononucleosis Screen Negative NEG^Negative   Strep, Rapid Screen   Result Value Ref Range    Specimen Description Throat     Rapid Strep A Screen       NEGATIVE: No Group A streptococcal antigen detected by immunoassay, await culture report.         ASSESSMENT/PLAN:    (J02.9) Viral pharyngitis  (primary encounter diagnosis)  Com ment: No evidence of bacterial infection today.   Plan: Strep, Rapid Screen, magic mouthwash suspension        (diphenhydramine, lidocaine, aluminum-magnesium        & simethicone), CBC with platelets         differential, Mononucleosis screen, Beta strep         group A culture    Follow-up with PCP if sxs change, worsen or fail to resolve with home comfort care measures over the next 5-7 days.  In addition to the above, \"red flag\" signs and sxs are reviewed with pt both verbally and by way of printed educational material for home review.  Pt verbalizes understanding of and agrees to the above plan.       (J02.9) Acute pharyngitis, unspecified etiology  Plan: Strep, Rapid Screen, magic mouthwash suspension        (diphenhydramine, lidocaine, aluminum-magnesium        & simethicone), CBC with platelets         differential, Mononucleosis screen, Beta strep         group A culture        "

## 2017-12-09 LAB
BACTERIA SPEC CULT: NORMAL
SPECIMEN SOURCE: NORMAL

## 2017-12-23 ENCOUNTER — OFFICE VISIT (OUTPATIENT)
Dept: FAMILY MEDICINE | Facility: CLINIC | Age: 32
End: 2017-12-23
Payer: COMMERCIAL

## 2017-12-23 VITALS
HEIGHT: 63 IN | SYSTOLIC BLOOD PRESSURE: 104 MMHG | HEART RATE: 114 BPM | TEMPERATURE: 98.8 F | BODY MASS INDEX: 26.22 KG/M2 | OXYGEN SATURATION: 99 % | DIASTOLIC BLOOD PRESSURE: 60 MMHG | RESPIRATION RATE: 14 BRPM | WEIGHT: 148 LBS

## 2017-12-23 DIAGNOSIS — G47.33 OSA (OBSTRUCTIVE SLEEP APNEA): ICD-10-CM

## 2017-12-23 DIAGNOSIS — Z00.00 ROUTINE GENERAL MEDICAL EXAMINATION AT A HEALTH CARE FACILITY: Primary | ICD-10-CM

## 2017-12-23 DIAGNOSIS — F31.81 BIPOLAR 2 DISORDER (H): ICD-10-CM

## 2017-12-23 DIAGNOSIS — E55.9 VITAMIN D DEFICIENCY: ICD-10-CM

## 2017-12-23 DIAGNOSIS — J45.20 MILD INTERMITTENT ASTHMA WITHOUT COMPLICATION: ICD-10-CM

## 2017-12-23 LAB
ALBUMIN SERPL-MCNC: 4.3 G/DL (ref 3.4–5)
ALP SERPL-CCNC: 54 U/L (ref 40–150)
ALT SERPL W P-5'-P-CCNC: 23 U/L (ref 0–50)
ANION GAP SERPL CALCULATED.3IONS-SCNC: 5 MMOL/L (ref 3–14)
AST SERPL W P-5'-P-CCNC: 16 U/L (ref 0–45)
BILIRUB SERPL-MCNC: 0.5 MG/DL (ref 0.2–1.3)
BUN SERPL-MCNC: 8 MG/DL (ref 7–30)
CALCIUM SERPL-MCNC: 9.1 MG/DL (ref 8.5–10.1)
CHLORIDE SERPL-SCNC: 106 MMOL/L (ref 94–109)
CHOLEST SERPL-MCNC: 160 MG/DL
CO2 SERPL-SCNC: 27 MMOL/L (ref 20–32)
CREAT SERPL-MCNC: 0.58 MG/DL (ref 0.52–1.04)
GFR SERPL CREATININE-BSD FRML MDRD: >90 ML/MIN/1.7M2
GLUCOSE SERPL-MCNC: 84 MG/DL (ref 70–99)
HDLC SERPL-MCNC: 56 MG/DL
LDLC SERPL CALC-MCNC: 87 MG/DL
NONHDLC SERPL-MCNC: 104 MG/DL
POTASSIUM SERPL-SCNC: 4.1 MMOL/L (ref 3.4–5.3)
PROT SERPL-MCNC: 7.9 G/DL (ref 6.8–8.8)
SODIUM SERPL-SCNC: 138 MMOL/L (ref 133–144)
TRIGL SERPL-MCNC: 84 MG/DL
TSH SERPL DL<=0.005 MIU/L-ACNC: 2.15 MU/L (ref 0.4–4)

## 2017-12-23 PROCEDURE — 99395 PREV VISIT EST AGE 18-39: CPT | Performed by: NURSE PRACTITIONER

## 2017-12-23 PROCEDURE — 80050 GENERAL HEALTH PANEL: CPT | Performed by: NURSE PRACTITIONER

## 2017-12-23 PROCEDURE — 82306 VITAMIN D 25 HYDROXY: CPT | Performed by: NURSE PRACTITIONER

## 2017-12-23 PROCEDURE — 36415 COLL VENOUS BLD VENIPUNCTURE: CPT | Performed by: NURSE PRACTITIONER

## 2017-12-23 PROCEDURE — 80061 LIPID PANEL: CPT | Performed by: NURSE PRACTITIONER

## 2017-12-23 RX ORDER — ALBUTEROL SULFATE 0.83 MG/ML
1 SOLUTION RESPIRATORY (INHALATION) EVERY 6 HOURS PRN
Qty: 25 VIAL | Refills: 11 | Status: SHIPPED | OUTPATIENT
Start: 2017-12-23 | End: 2019-05-08

## 2017-12-23 RX ORDER — ALBUTEROL SULFATE 90 UG/1
2 AEROSOL, METERED RESPIRATORY (INHALATION) EVERY 6 HOURS PRN
Qty: 1 INHALER | Refills: 11 | Status: SHIPPED | OUTPATIENT
Start: 2017-12-23 | End: 2019-02-11

## 2017-12-23 NOTE — PROGRESS NOTES
SUBJECTIVE:   CC: Jonelle Khan is an 32 year old woman who presents for preventive health visit.     Physical   Annual:     Getting at least 3 servings of Calcium per day::  Yes    Bi-annual eye exam::  Yes    Dental care twice a year::  NO    Sleep apnea or symptoms of sleep apnea::  Sleep apnea    Diet::  Regular (no restrictions)    Frequency of exercise::  2-3 days/week    Duration of exercise::  30-45 minutes    Taking medications regularly::  Yes    Medication side effects::  None    Additional concerns today::  YES          Menses:  Periods regular, lasting 3-4 days, light flow.  Last pap in  3/2015, NIL.   Depression/BIpolar disorder:  Doing better. Taking sertraline 100 mg every day. Continues to see counselor on a weekly basis.   Asthma:  Continues to have nasal congestion, ACT of 19.       Today's PHQ-2 Score:   PHQ-2 ( 1999 [employer]) 12/23/2017   Q1: Little interest or pleasure in doing things 0   Q2: Feeling down, depressed or hopeless 0   PHQ-2 Score 0   Q1: Little interest or pleasure in doing things Not at all   Q2: Feeling down, depressed or hopeless Not at all   PHQ-2 Score 0       Abuse: Current or Past(Physical, Sexual or Emotional)- No  Do you feel safe in your environment - Yes    Social History   Substance Use Topics     Smoking status: Light Tobacco Smoker     Types: Cigarettes     Smokeless tobacco: Never Used      Comment: occasional     Alcohol use 0.0 oz/week     0 Standard drinks or equivalent per week      Comment: 1-2 a month     Alcohol Use 12/23/2017   If you drink alcohol, do you typically have greater than 3 drinks per day OR greater than 7 drinks per week?   No       Reviewed orders with patient.  Reviewed health maintenance and updated orders accordingly - Yes    Mammogram not appropriate for this patient based on age.    Pertinent mammograms are reviewed under the imaging tab.  History of abnormal Pap smear: NO - age 30- 65 PAP every 3 years recommended    Reviewed and  "updated as needed this visit by clinical staff         Reviewed and updated as needed this visit by Provider        Review of Systems  C: NEGATIVE for fever, chills, change in weight  I: NEGATIVE for worrisome rashes, moles or lesions  E: NEGATIVE for vision changes or irritation  ENT: NEGATIVE for ear, mouth and throat problems  R: NEGATIVE for significant cough or SOB  B: NEGATIVE for masses, tenderness or discharge  CV: NEGATIVE for chest pain, palpitations or peripheral edema  GI: NEGATIVE for nausea, abdominal pain, heartburn, or change in bowel habits  : NEGATIVE for unusual urinary or vaginal symptoms. Periods are regular.  M: NEGATIVE for significant arthralgias or myalgia  N: NEGATIVE for weakness, dizziness or paresthesias  P: NEGATIVE for changes in mood or affect     OBJECTIVE:   /60 (BP Location: Left arm, Patient Position: Chair, Cuff Size: Adult Regular)  Pulse 114  Temp 98.8  F (37.1  C) (Oral)  Resp 14  Ht 5' 2.5\" (1.588 m)  Wt 148 lb (67.1 kg)  SpO2 99%  BMI 26.64 kg/m2  Physical Exam  GENERAL: healthy, alert and no distress  EYES: Eyes grossly normal to inspection, PERRL and conjunctivae and sclerae normal  HENT: ear canals and TM's normal, nose and mouth without ulcers or lesions  NECK: no adenopathy, no asymmetry, masses, or scars and thyroid normal to palpation  RESP: lungs clear to auscultation - no rales, rhonchi or wheezes  BREAST: normal without masses, tenderness or nipple discharge and no palpable axillary masses or adenopathy  CV: regular rate and rhythm, normal S1 S2, no S3 or S4, no murmur, click or rub, no peripheral edema and peripheral pulses strong  ABDOMEN: soft, nontender, no hepatosplenomegaly, no masses and bowel sounds normal  MS: no gross musculoskeletal defects noted, no edema  SKIN: no suspicious lesions or rashes  NEURO: Normal strength and tone, mentation intact and speech normal  PSYCH: mentation appears normal, affect normal/bright    ASSESSMENT/PLAN: " "  Jonelle was seen today for physical.    Diagnoses and all orders for this visit:    Routine general medical examination at a health care facility  -     CBC with platelets differential  -     Comprehensive metabolic panel  -     Lipid panel reflex to direct LDL Fasting  -     Vitamin D Deficiency  -     TSH with free T4 reflex    Bipolar 2 disorder (H): well controlled, weekly counseling.     Mild intermittent asthma without complication:  ACT of 19, will recheck in 2 weeks.   -     albuterol (2.5 MG/3ML) 0.083% neb solution; Take 1 vial (2.5 mg) by nebulization every 6 hours as needed for shortness of breath / dyspnea or wheezing  -     albuterol (PROAIR HFA/PROVENTIL HFA/VENTOLIN HFA) 108 (90 BASE) MCG/ACT Inhaler; Inhale 2 puffs into the lungs every 6 hours as needed for shortness of breath / dyspnea or wheezing    ONELIA (obstructive sleep apnea):  Using CPAP    COUNSELING:  Reviewed preventive health counseling, as reflected in patient instructions       Regular exercise       Healthy diet/nutrition       Folic Acid Counseling         reports that she has been smoking Cigarettes.  She has never used smokeless tobacco.  Tobacco Cessation Action Plan: Information offered: Patient not interested at this time  Estimated body mass index is 26.44 kg/(m^2) as calculated from the following:    Height as of 11/9/17: 5' 2.5\" (1.588 m).    Weight as of 11/9/17: 146 lb 14.4 oz (66.6 kg).   Weight management plan: Discussed healthy diet and exercise guidelines and patient will follow up in 6 months in clinic to re-evaluate.    Counseling Resources:  ATP IV Guidelines  Pooled Cohorts Equation Calculator  Breast Cancer Risk Calculator  FRAX Risk Assessment  ICSI Preventive Guidelines  Dietary Guidelines for Americans, 2010  USDA's MyPlate  ASA Prophylaxis  Lung CA Screening  Follow up in 6 months, sooner as needed.  Susan Haase, APRN [facility]  Answers for HPI/ROS submitted by the patient on 12/23/2017 "   PHQ-2 Score: 0

## 2017-12-23 NOTE — MR AVS SNAPSHOT
After Visit Summary   12/23/2017    Jonelle Khan    MRN: [id number]           Patient Information     Date Of Birth          1985        Visit Information        Provider Department      12/23/2017 11:00 AM Haase, Susan Rachele, APRN [facility]        Today's Diagnoses     Routine general medical examination at a health care facility    -  1    Exacerbation of asthma, unspecified asthma severity, unspecified whether persistent        Mild intermittent asthma without complication          Care Instructions      Preventive Health Recommendations  Female Ages 26 - 39  Yearly exam:   See your health care provider every year in order to    Review health changes.     Discuss preventive care.      Review your medicines if you your doctor has prescribed any.    Until age 30: Get a Pap test every three years (more often if you have had an abnormal result).    After age 30: Talk to your doctor about whether you should have a Pap test every 3 years or have a Pap test with HPV screening every 5 years.   You do not need a Pap test if your uterus was removed (hysterectomy) and you have not had cancer.  You should be tested each year for STDs (sexually transmitted diseases), if you're at risk.   Talk to your provider about how often to have your cholesterol checked.  If you are at risk for diabetes, you should have a diabetes test (fasting glucose).  Shots: Get a flu shot each year. Get a tetanus shot every 10 years.   Nutrition:     Eat at least 5 servings of fruits and vegetables each day.    Eat whole-grain bread, whole-wheat pasta and brown rice instead of white grains and rice.    Talk to your provider about Calcium and Vitamin D.     Lifestyle    Exercise at least 150 minutes a week (30 minutes a day, 5 days of the week). This will help you control your weight and prevent disease.    Limit alcohol to one drink per day.    No smoking.     Wear sunscreen to prevent skin  "cancer.    See your dentist every six months for an exam and cleaning.            Follow-ups after your visit        Follow-up notes from your care team     Return in about 6 months (around 2018).      Who to contact     If you have questions or need follow up information about today's clinic visit or your schedule please contact [facility] directly at [phone].  Normal or non-critical lab and imaging results will be communicated to you by MyChart, letter or phone within 4 business days after the clinic has received the results. If you do not hear from us within 7 days, please contact the clinic through [employer] or phone. If you have a critical or abnormal lab result, we will notify you by phone as soon as possible.  Submit refill requests through [employer] or call your pharmacy and they will forward the refill request to us. Please allow 3 business days for your refill to be completed.          Additional Information About Your Visit        [employer] Information     [employer] lets you send messages to your doctor, view your test results, renew your prescriptions, schedule appointments and more. To sign up, go to [email or web address] . Click on \"Log in\" on the left side of the screen, which will take you to the Welcome page. Then click on \"Sign up Now\" on the right side of the page.     You will be asked to enter the access code listed below, as well as some personal information. Please follow the directions to create your username and password.     Your access code is: [email or web address]  Expires: 2018  9:43 AM     Your access code will  in 90 days. If you need help or a new code, please call your [facility] clinic or [phone].        Care EveryWhere ID     This is your Care EveryWhere ID. This could be used by other organizations to access your [facility] medical records  [id number]        Your Vitals Were     Pulse Temperature Respirations Height Pulse Oximetry BMI (Body Mass Index) " "   114 98.8  F (37.1  C) (Oral) 14 5' 2.5\" (1.588 m) 99% 26.64 kg/m2       Blood Pressure from Last 3 Encounters:   12/23/17 104/60   12/07/17 120/64   11/09/17 124/72    Weight from Last 3 Encounters:   12/23/17 148 lb (67.1 kg)   11/09/17 146 lb 14.4 oz (66.6 kg)   10/26/17 143 lb (64.9 kg)              We Performed the Following     Asthma Action Plan (AAP)     CBC with platelets differential     Comprehensive metabolic panel     Lipid panel reflex to direct LDL Fasting     Vitamin D Deficiency          Where to get your medicines      These medications were sent to [facility] [id number] [place] [address]  [address] [facility] [address]     Phone:  [phone]     albuterol (2.5 MG/3ML) 0.083% neb solution    albuterol 108 (90 BASE) MCG/ACT Inhaler          Primary Care Provider Office Phone # Fax #    Susan Rachele Haase, APRN [phone] [address] [facility] [address]        Equal Access to Services     WILFREDO AVERY AH: Hadii joseline ameso Sotory, waaxda luqadaha, qaybta kaalmada adeegyada, chago paniagua. [facility] [phone].    ATENCIÓN: Si habla español, tiene a crockett disposición servicios gratuitos de asistencia lingüística. [facility] [phone].    We comply with applicable federal civil rights laws and [place] laws. We do not discriminate on the basis of race, color, national origin, age, disability, sex, sexual orientation, or gender identity.            Thank you!     Thank you for choosing [facility]  for your care. Our goal is always to provide you with excellent care. Hearing back from our patients is one way we can continue to improve our services. Please take a few minutes to complete the written survey that you may receive in the mail after your visit with us. Thank you!             Your Updated Medication List - Protect others around you: Learn how to safely use, store and throw " away your medicines at www.disposemymeds.org.          This list is accurate as of: 12/23/17 11:20 AM.  Always use your most recent med list.                   Brand Name Dispense Instructions for use Diagnosis    * albuterol (2.5 MG/3ML) 0.083% neb solution     75 mL    Take 1 vial (2.5 mg) by nebulization every 6 hours as needed for shortness of breath / dyspnea or wheezing    Cough       * albuterol (2.5 MG/3ML) 0.083% neb solution     25 vial    Take 1 vial (2.5 mg) by nebulization every 6 hours as needed for shortness of breath / dyspnea or wheezing    Exacerbation of asthma, unspecified asthma severity, unspecified whether persistent       * albuterol 108 (90 BASE) MCG/ACT Inhaler    PROAIR HFA/PROVENTIL HFA/VENTOLIN HFA    1 Inhaler    Inhale 2 puffs into the lungs every 6 hours as needed for shortness of breath / dyspnea or wheezing    Mild intermittent asthma without complication       hydrOXYzine 25 MG tablet    ATARAX    60 tablet    Take 1-2 tablets (25-50 mg) by mouth every 6 hours as needed for anxiety    Anxiety       ipratropium - albuterol 0.5 mg/2.5 mg/3 mL 0.5-2.5 (3) MG/3ML neb solution    DUONEB    3 mL    Take 1 vial (3 mLs) by nebulization once for 1 dose    Acute bronchospasm       ondansetron 4 MG tablet    ZOFRAN    12 tablet    Take 1 tablet (4 mg) by mouth every 8 hours as needed for nausea    Nausea, Abdominal pain, left lower quadrant       sertraline 100 MG tablet    ZOLOFT    90 tablet    Take 1 tablet (100 mg) by mouth daily    Anxiety       * Notice:  This list has 3 medication(s) that are the same as other medications prescribed for you. Read the directions carefully, and ask your doctor or other care provider to review them with you.

## 2017-12-23 NOTE — LETTER
December 27, 2017      Jonelle Khan  [address] [place] [address]        Hi Jonelle,   Your lab results are as below:   1)  TSH (thyroid level) 2.15 which is normal (range 0.4-4)   2)  Cholesterol was normal at 160,  your LDL (bad cholesterol) and your HDL (good cholesterol) were also within normal range.   3)  Glucose was normal at 84 (normal fasting is <100).   4)  Your vitamin d level was low at 16, normal is 20-75. Having a low vitamin D level can cause body aches and fatigue.  I would like you to take vitamin D 50,000 iu every week for the next 12 weeks.  I have sent in a prescriptions for vitamin D to your pharmacy.  After 12 weeks I would like you to come in for a lab only appointment to recheck your vitamin D level, you do not have to be fasting for that lab appointment.     Resulted Orders   CBC with platelets differential   Result Value Ref Range    WBC 7.1 4.0 - 11.0 10e9/L    RBC Count 4.70 3.8 - 5.2 10e12/L    Hemoglobin 14.4 11.7 - 15.7 g/dL    Hematocrit 43.4 35.0 - 47.0 %    MCV 92 78 - 100 fl    MCH 30.6 26.5 - 33.0 pg    MCHC 33.2 31.5 - 36.5 g/dL    RDW 12.6 10.0 - 15.0 %    Platelet Count 201 150 - 450 10e9/L    Diff Method Automated Method     % Neutrophils 54.8 %    % Lymphocytes 28.0 %    % Monocytes 12.5 %    % Eosinophils 3.7 %    % Basophils 1.0 %    Absolute Neutrophil 3.9 1.6 - 8.3 10e9/L    Absolute Lymphocytes 2.0 0.8 - 5.3 10e9/L    Absolute Monocytes 0.9 0.0 - 1.3 10e9/L    Absolute Eosinophils 0.3 0.0 - 0.7 10e9/L    Absolute Basophils 0.1 0.0 - 0.2 10e9/L   Comprehensive metabolic panel   Result Value Ref Range    Sodium 138 133 - 144 mmol/L    Potassium 4.1 3.4 - 5.3 mmol/L    Chloride 106 94 - 109 mmol/L    Carbon Dioxide 27 20 - 32 mmol/L    Anion Gap 5 3 - 14 mmol/L    Glucose 84 70 - 99 mg/dL      Comment:      Fasting specimen    Urea Nitrogen 8 7 - 30 mg/dL    Creatinine 0.58 0.52 - 1.04 mg/dL    GFR Estimate >90 >60 mL/min/1.7m2      Comment:      Non   American GFR Calc    GFR Estimate If Black >90 >60 mL/min/1.7m2      Comment:       GFR Calc    Calcium 9.1 8.5 - 10.1 mg/dL    Bilirubin Total 0.5 0.2 - 1.3 mg/dL    Albumin 4.3 3.4 - 5.0 g/dL    Protein Total 7.9 6.8 - 8.8 g/dL    Alkaline Phosphatase 54 40 - 150 U/L    ALT 23 0 - 50 U/L    AST 16 0 - 45 U/L   Lipid panel reflex to direct LDL Fasting   Result Value Ref Range    Cholesterol 160 <200 mg/dL    Triglycerides 84 <150 mg/dL      Comment:      Fasting specimen    HDL Cholesterol 56 >49 mg/dL    LDL Cholesterol Calculated 87 <100 mg/dL      Comment:      Desirable:       <100 mg/dl    Non HDL Cholesterol 104 <130 mg/dL   Vitamin D Deficiency   Result Value Ref Range    Vitamin D Deficiency screening 16 (L) 20 - 75 ug/L      Comment:      Season, race, dietary intake, and treatment affect the concentration of   25-hydroxy-Vitamin D. Values may decrease during winter months and increase   during summer months. Values 20-29 ug/L may indicate Vitamin D insufficiency   and values <20 ug/L may indicate Vitamin D deficiency.  Vitamin D determination is routinely performed by an immunoassay specific for   25 hydroxyvitamin D3.  If an individual is on vitamin D2 (ergocalciferol)   supplementation, please specify 25 OH vitamin D2 and D3 level determination by   LCMSMS test VITD23.     TSH with free T4 reflex   Result Value Ref Range    TSH 2.15 0.40 - 4.00 mU/L       If you have any questions or concerns, please call the clinic at the number listed above.       Sincerely,    Susan Haase, APRN CNP

## 2017-12-24 ASSESSMENT — ASTHMA QUESTIONNAIRES: ACT_TOTALSCORE: 19

## 2017-12-26 ENCOUNTER — TELEPHONE (OUTPATIENT)
Dept: FAMILY MEDICINE | Facility: CLINIC | Age: 32
End: 2017-12-26

## 2017-12-26 PROBLEM — E55.9 VITAMIN D DEFICIENCY: Status: ACTIVE | Noted: 2017-12-26

## 2017-12-26 LAB
BASOPHILS # BLD AUTO: 0.1 10E9/L (ref 0–0.2)
BASOPHILS NFR BLD AUTO: 1 %
DEPRECATED CALCIDIOL+CALCIFEROL SERPL-MC: 16 UG/L (ref 20–75)
DIFFERENTIAL METHOD BLD: NORMAL
EOSINOPHIL # BLD AUTO: 0.3 10E9/L (ref 0–0.7)
EOSINOPHIL NFR BLD AUTO: 3.7 %
ERYTHROCYTE [DISTWIDTH] IN BLOOD BY AUTOMATED COUNT: 12.6 % (ref 10–15)
HCT VFR BLD AUTO: 43.4 % (ref 35–47)
HGB BLD-MCNC: 14.4 G/DL (ref 11.7–15.7)
LYMPHOCYTES # BLD AUTO: 2 10E9/L (ref 0.8–5.3)
LYMPHOCYTES NFR BLD AUTO: 28 %
MCH RBC QN AUTO: 30.6 PG (ref 26.5–33)
MCHC RBC AUTO-ENTMCNC: 33.2 G/DL (ref 31.5–36.5)
MCV RBC AUTO: 92 FL (ref 78–100)
MONOCYTES # BLD AUTO: 0.9 10E9/L (ref 0–1.3)
MONOCYTES NFR BLD AUTO: 12.5 %
NEUTROPHILS # BLD AUTO: 3.9 10E9/L (ref 1.6–8.3)
NEUTROPHILS NFR BLD AUTO: 54.8 %
PLATELET # BLD AUTO: 201 10E9/L (ref 150–450)
RBC # BLD AUTO: 4.7 10E12/L (ref 3.8–5.2)
WBC # BLD AUTO: 7.1 10E9/L (ref 4–11)

## 2017-12-26 RX ORDER — ERGOCALCIFEROL 1.25 MG/1
50000 CAPSULE, LIQUID FILLED ORAL
Qty: 12 CAPSULE | Refills: 0 | Status: SHIPPED | OUTPATIENT
Start: 2017-12-26 | End: 2019-02-11

## 2017-12-26 NOTE — TELEPHONE ENCOUNTER
Panel Management Review      Patient has the following on her problem list:     Asthma review     ACT Total Scores 12/23/2017   ACT TOTAL SCORE -   ASTHMA ER VISITS -   ASTHMA HOSPITALIZATIONS -   ACT TOTAL SCORE (Goal Greater than or Equal to 20) 19   In the past 12 months, how many times did you visit the emergency room for your asthma without being admitted to the hospital? 0   In the past 12 months, how many times were you hospitalized overnight because of your asthma? 0      1. Is Asthma diagnosis on the Problem List? Yes    2. Is Asthma listed on Health Maintenance? Yes    3. Patient is due for:  ACT        Composite cancer screening  Chart review shows that this patient is due/due soon for the following Pap Smear  Summary:    Patient is due/failing the following:   ACT    Action needed:   Patient needs to do ACT.    Type of outreach:    none currently, pt was in clinic on 12/23/2017 with a low ACT score.  pt was given a copy of ACT and told we would call her in 2 weeks to go over. will postpone message until then.    Questions for provider review:    None                                                                                                                                    Jonelle Quinn, [facility]       Chart routed to Care Team .

## 2018-01-12 ASSESSMENT — ASTHMA QUESTIONNAIRES: ACT_TOTALSCORE: 20

## 2018-04-01 ENCOUNTER — TRANSFERRED RECORDS (OUTPATIENT)
Dept: HEALTH INFORMATION MANAGEMENT | Facility: CLINIC | Age: 33
End: 2018-04-01

## 2018-04-01 LAB — PAP SMEAR - HIM PATIENT REPORTED: NEGATIVE

## 2018-04-05 ENCOUNTER — TRANSFERRED RECORDS (OUTPATIENT)
Dept: HEALTH INFORMATION MANAGEMENT | Facility: CLINIC | Age: 33
End: 2018-04-05

## 2018-04-12 ENCOUNTER — OFFICE VISIT (OUTPATIENT)
Dept: FAMILY MEDICINE | Facility: CLINIC | Age: 33
End: 2018-04-12
Payer: COMMERCIAL

## 2018-04-12 VITALS
SYSTOLIC BLOOD PRESSURE: 110 MMHG | TEMPERATURE: 98.6 F | OXYGEN SATURATION: 98 % | HEART RATE: 76 BPM | HEIGHT: 63 IN | DIASTOLIC BLOOD PRESSURE: 74 MMHG | RESPIRATION RATE: 14 BRPM | WEIGHT: 157 LBS | BODY MASS INDEX: 27.82 KG/M2

## 2018-04-12 DIAGNOSIS — N92.6 IRREGULAR PERIODS: ICD-10-CM

## 2018-04-12 DIAGNOSIS — G43.009 MIGRAINE WITHOUT AURA AND WITHOUT STATUS MIGRAINOSUS, NOT INTRACTABLE: Primary | ICD-10-CM

## 2018-04-12 DIAGNOSIS — F41.9 ANXIETY: ICD-10-CM

## 2018-04-12 DIAGNOSIS — K21.00 GASTROESOPHAGEAL REFLUX DISEASE WITH ESOPHAGITIS: ICD-10-CM

## 2018-04-12 DIAGNOSIS — F31.81 BIPOLAR 2 DISORDER (H): ICD-10-CM

## 2018-04-12 LAB — BETA HCG QUAL IFA URINE: NEGATIVE

## 2018-04-12 PROCEDURE — 99214 OFFICE O/P EST MOD 30 MIN: CPT | Performed by: NURSE PRACTITIONER

## 2018-04-12 PROCEDURE — 84703 CHORIONIC GONADOTROPIN ASSAY: CPT | Performed by: NURSE PRACTITIONER

## 2018-04-12 RX ORDER — SERTRALINE HYDROCHLORIDE 100 MG/1
100 TABLET, FILM COATED ORAL DAILY
Qty: 90 TABLET | Refills: 1 | Status: SHIPPED | OUTPATIENT
Start: 2018-04-12 | End: 2018-12-27

## 2018-04-12 RX ORDER — HYDROXYZINE HYDROCHLORIDE 25 MG/1
25-50 TABLET, FILM COATED ORAL EVERY 6 HOURS PRN
Qty: 60 TABLET | Refills: 1 | Status: SHIPPED | OUTPATIENT
Start: 2018-04-12 | End: 2019-02-11

## 2018-04-12 ASSESSMENT — PATIENT HEALTH QUESTIONNAIRE - PHQ9
10. IF YOU CHECKED OFF ANY PROBLEMS, HOW DIFFICULT HAVE THESE PROBLEMS MADE IT FOR YOU TO DO YOUR WORK, TAKE CARE OF THINGS AT HOME, OR GET ALONG WITH OTHER PEOPLE: SOMEWHAT DIFFICULT
SUM OF ALL RESPONSES TO PHQ QUESTIONS 1-9: 8
SUM OF ALL RESPONSES TO PHQ QUESTIONS 1-9: 8

## 2018-04-12 NOTE — MR AVS SNAPSHOT
"              After Visit Summary   4/12/2018    Jonelle Khan    MRN: [id number]           Patient Information     Date Of Birth          1985        Visit Information        Provider Department      4/12/2018 1:00 PM Haase, Susan Rachele, APRN CNP [facility] [place]        Today's Diagnoses     Migraine without aura and without status migrainosus, not intractable    -  1    Irregular periods        Gastroesophageal reflux disease with esophagitis        Anxiety           Follow-ups after your visit        Follow-up notes from your care team     Return in about 6 months (around 10/12/2018) for Routine Visit.      Who to contact     If you have questions or need follow up information about today's clinic visit or your schedule please contact [facility] directly at [phone].  Normal or non-critical lab and imaging results will be communicated to you by MyChart, letter or phone within 4 business days after the clinic has received the results. If you do not hear from us within 7 days, please contact the clinic through MyChart or phone. If you have a critical or abnormal lab result, we will notify you by phone as soon as possible.  Submit refill requests through [email or web address] or call your pharmacy and they will forward the refill request to us. Please allow 3 business days for your refill to be completed.          Additional Information About Your Visit        General [employer] Information     [email or web address] lets you send messages to your doctor, view your test results, renew your prescriptions, schedule appointments and more. To sign up, go to [email or web address] . Click on \"Log in\" on the left side of the screen, which will take you to the Welcome page. Then click on \"Sign up Now\" on the right side of the page.     You will be asked to enter the access code listed below, as well as some personal information. Please follow the directions to create your username and password.     Your access " "code is: [email or web address]  Expires: 2018  1:36 PM     Your access code will  in 90 days. If you need help or a new code, please call your [facility] clinic or [phone].        Care EveryWhere ID     This is your Care EveryWhere ID. This could be used by other organizations to access your [facility] medical records  [id number]        Your Vitals Were     Pulse Temperature Respirations Height Pulse Oximetry BMI (Body Mass Index)    76 98.6  F (37  C) (Oral) 14 5' 2.5\" (1.588 m) 98% 28.26 kg/m2       Blood Pressure from Last 3 Encounters:   18 110/74   17 104/60   17 120/64    Weight from Last 3 Encounters:   18 157 lb (71.2 kg)   17 148 lb (67.1 kg)   17 146 lb 14.4 oz (66.6 kg)              We Performed the Following     Beta HCG qual IFA urine - [facility] and [place]          Today's Medication Changes          These changes are accurate as of 18  1:45 PM.  If you have any questions, ask your nurse or doctor.               Start taking these medicines.        Dose/Directions    ranitidine 150 MG tablet   Commonly known as:  ZANTAC   Used for:  Gastroesophageal reflux disease with esophagitis   Started by:  Haase, Susan Rachele, APRN CNP        Dose:  150 mg   Take 1 tablet (150 mg) by mouth 2 times daily   Quantity:  30 tablet   Refills:  1            Where to get your medicines      These medications were sent to [facility] [address]  [id number] [place] [address]     Phone:  [phone]     hydrOXYzine 25 MG tablet    ranitidine 150 MG tablet    sertraline 100 MG tablet                Primary Care Provider Office Phone # Fax #    Susan Rachele Haase, APRN -[phone] [address] [facility] [address]        Equal Access to Services     WILFREDO AVERY AH: Pato coulter Sotory, washaanda luqadaha, qaybta kaalgarrett salamanca, chago paniagua. So [facility] " [phone].    ATENCIÓN: Si zoe roper, tiene a crockett disposición servicios gratuitos de asistencia lingüística. Cesar estrada [phone].    We comply with applicable federal civil rights laws and [place] laws. We do not discriminate on the basis of race, color, national origin, age, disability, sex, sexual orientation, or gender identity.            Thank you!     Thank you for choosing [facility]  for your care. Our goal is always to provide you with excellent care. Hearing back from our patients is one way we can continue to improve our services. Please take a few minutes to complete the written survey that you may receive in the mail after your visit with us. Thank you!             Your Updated Medication List - Protect others around you: Learn how to safely use, store and throw away your medicines at www.disposemymeds.org.          This list is accurate as of 4/12/18  1:45 PM.  Always use your most recent med list.                   Brand Name Dispense Instructions for use Diagnosis    * albuterol (2.5 MG/3ML) 0.083% neb solution     25 vial    Take 1 vial (2.5 mg) by nebulization every 6 hours as needed for shortness of breath / dyspnea or wheezing    Mild intermittent asthma without complication       * albuterol 108 (90 Base) MCG/ACT Inhaler    PROAIR HFA/PROVENTIL HFA/VENTOLIN HFA    1 Inhaler    Inhale 2 puffs into the lungs every 6 hours as needed for shortness of breath / dyspnea or wheezing    Mild intermittent asthma without complication       hydrOXYzine 25 MG tablet    ATARAX    60 tablet    Take 1-2 tablets (25-50 mg) by mouth every 6 hours as needed for anxiety    Anxiety       ipratropium - albuterol 0.5 mg/2.5 mg/3 mL 0.5-2.5 (3) MG/3ML neb solution    DUONEB    3 mL    Take 1 vial (3 mLs) by nebulization once for 1 dose    Acute bronchospasm       ondansetron 4 MG tablet    ZOFRAN    12 tablet    Take 1 tablet (4 mg) by mouth every 8 hours as needed for nausea    Nausea,  Abdominal pain, left lower quadrant       ranitidine 150 MG tablet    ZANTAC    30 tablet    Take 1 tablet (150 mg) by mouth 2 times daily    Gastroesophageal reflux disease with esophagitis       sertraline 100 MG tablet    ZOLOFT    90 tablet    Take 1 tablet (100 mg) by mouth daily    Anxiety       * Notice:  This list has 2 medication(s) that are the same as other medications prescribed for you. Read the directions carefully, and ask your doctor or other care provider to review them with you.

## 2018-04-13 ASSESSMENT — PATIENT HEALTH QUESTIONNAIRE - PHQ9: SUM OF ALL RESPONSES TO PHQ QUESTIONS 1-9: 8

## 2018-05-25 ENCOUNTER — HEALTH MAINTENANCE LETTER (OUTPATIENT)
Age: 33
End: 2018-05-25

## 2018-05-29 ENCOUNTER — TRANSFERRED RECORDS (OUTPATIENT)
Dept: HEALTH INFORMATION MANAGEMENT | Facility: CLINIC | Age: 33
End: 2018-05-29

## 2018-05-29 LAB
HPV ABSTRACT: NORMAL
PAP-ABSTRACT: NORMAL

## 2018-07-27 ENCOUNTER — TELEPHONE (OUTPATIENT)
Dept: FAMILY MEDICINE | Facility: CLINIC | Age: 33
End: 2018-07-27

## 2018-07-27 NOTE — LETTER
[facility]  [address] [address]  [phone]  August 9, 2018    Jonelle Khan  [address] [place] [address]    Dear Jonelle,    I care about your health and have reviewed your health plan. I have reviewed your medical conditions, medication list, and lab results and am making recommendations based on this review, to better manage your health.    You are in particular need of attention regarding:  -Asthma  -Cervical Cancer Screening    I am recommending that you:  {recommendations:-schedule a PAP SMEAR EXAM which is due.  Please disregard this reminder if you have had this exam elsewhere within the last year.  It would be helpful for us to have a copy of your recent pap smear report in our file so that we can best coordinate your care.   -Complete and return the attached ASTHMA CONTROL TEST.  If your total score is 19 or less or you have been to the ER or urgent care for your asthma, then please schedule an asthma followup appointment.    Here is a list of Health Maintenance topics that are due now or due soon:  Health Maintenance Due   Topic Date Due     PAP Q3 YR  03/20/2018     ASTHMA CONTROL TEST Q6 MOS  06/26/2018       Please call us at [phone] (or use [employer]) to address the above recommendations.     Thank you for trusting [facility] and we appreciate the opportunity to serve you.  We look forward to supporting your healthcare needs in the future.    Healthy Regards,    Susan Haase CNP

## 2018-07-27 NOTE — TELEPHONE ENCOUNTER
Panel Management Review      Patient has the following on her problem list:     Asthma review     ACT Total Scores 1/11/2018   ACT TOTAL SCORE -   ASTHMA ER VISITS -   ASTHMA HOSPITALIZATIONS -   ACT TOTAL SCORE (Goal Greater than or Equal to 20) 20   In the past 12 months, how many times did you visit the emergency room for your asthma without being admitted to the hospital? 0   In the past 12 months, how many times were you hospitalized overnight because of your asthma? 0      1. Is Asthma diagnosis on the Problem List? Yes    2. Is Asthma listed on Health Maintenance? Yes    3. Patient is due for:  ACT      Composite cancer screening  Chart review shows that this patient is due/due soon for the following Pap Smear  Summary:    Patient is due/failing the following:   ACT and PAP    Action needed:   Patient needs office visit for physical and pap also asthma follow up.    Type of outreach:    routed to panel pool for outreach    Questions for provider review:    None                                                                                                                                    Jonelle Quinn, [facility]       Chart routed to Care Team .

## 2018-11-09 RX ORDER — SODIUM CHLORIDE, SODIUM LACTATE, POTASSIUM CHLORIDE, CALCIUM CHLORIDE 600; 310; 30; 20 MG/100ML; MG/100ML; MG/100ML; MG/100ML
INJECTION, SOLUTION INTRAVENOUS CONTINUOUS
Status: CANCELLED | OUTPATIENT
Start: 2018-11-09

## 2018-11-09 RX ORDER — CLINDAMYCIN PHOSPHATE 900 MG/50ML
900 INJECTION, SOLUTION INTRAVENOUS
Status: CANCELLED | OUTPATIENT
Start: 2018-11-09

## 2018-11-09 RX ORDER — CITRIC ACID/SODIUM CITRATE 334-500MG
30 SOLUTION, ORAL ORAL
Status: CANCELLED | OUTPATIENT
Start: 2018-11-09

## 2018-11-19 ENCOUNTER — TELEPHONE (OUTPATIENT)
Dept: FAMILY MEDICINE | Facility: CLINIC | Age: 33
End: 2018-11-19

## 2018-11-19 DIAGNOSIS — J45.20 MILD INTERMITTENT ASTHMA WITHOUT COMPLICATION: Primary | ICD-10-CM

## 2018-11-19 NOTE — LETTER
[facility]  [address] [place] [address]  [phone]  December 20, 2018    Jonelle Khan  [address] [place] [address]    Dear Jonelle,    I care about your health and have reviewed your health plan. I have reviewed your medical conditions, medication list, and lab results and am making recommendations based on this review, to better manage your health.    You are in particular need of attention regarding:  -Asthma  -Depression  -Cervical Cancer Screening    I am recommending that you:  {recommendations:-schedule a PAP SMEAR EXAM which is due.  Please disregard this reminder if you have had this exam elsewhere within the last year.  It would be helpful for us to have a copy of your recent pap smear report in our file so that we can best coordinate your care.   -Complete and return the attached ASTHMA CONTROL TEST.  If your total score is 19 or less or you have been to the ER or urgent care for your asthma, then please schedule an asthma followup appointment.  Please complete enclosed PHQ-9 form and return in envelope provided.    Here is a list of Health Maintenance topics that are due now or due soon:  Health Maintenance Due   Topic Date Due     PAP Q3 YR  03/20/2018     ASTHMA CONTROL TEST Q6 MOS  06/26/2018     INFLUENZA VACCINE (1) 09/01/2018     PHQ-9 Q6 MONTHS  10/12/2018     ASTHMA ACTION PLAN Q1 YR  12/23/2018     WELLNESS VISIT Q1 YR  12/23/2018       Please call us at [phone] (or use [employer]) to address the above recommendations.     Thank you for trusting [facility] and we appreciate the opportunity to serve you.  We look forward to supporting your healthcare needs in the future.    Healthy Regards,    Susan Haase CNP

## 2018-11-19 NOTE — LETTER
My Asthma Action Plan  Name: Jonelle Khan   YOB: 1985  Date: 12/20/2018   My doctor: Susan Haase, APRN CNP   My clinic: [facility]        My Control Medicine: None  My Rescue Medicine: Albuterol nebulizer solution 0.83  Albuterol/ipratroprium  (Duoneb) nebulizer solution .   My Asthma Severity: intermittent  Avoid your asthma triggers: Patient is unaware of triggers               GREEN ZONE   Good Control    I feel good    No cough or wheeze    Can work, sleep and play without asthma symptoms       Take your asthma control medicine every day.     1. If exercise triggers your asthma, take your rescue medication    15 minutes before exercise or sports, and    During exercise if you have asthma symptoms  2. Spacer to use with inhaler: If you have a spacer, make sure to use it with your inhaler             YELLOW ZONE Getting Worse  I have ANY of these:    I do not feel good    Cough or wheeze    Chest feels tight    Wake up at night   1. Keep taking your Green Zone medications  2. Start taking your rescue medicine:    every 20 minutes for up to 1 hour. Then every 4 hours for 24-48 hours.  3. If you stay in the Yellow Zone for more than 12-24 hours, contact your doctor.  4. If you do not return to the Green Zone in 12-24 hours or you get worse, start taking your oral steroid medicine if prescribed by your provider.           RED ZONE Medical Alert - Get Help  I have ANY of these:    I feel awful    Medicine is not helping    Breathing getting harder    Trouble walking or talking    Nose opens wide to breathe       1. Take your rescue medicine NOW  2. If your provider has prescribed an oral steroid medicine, start taking it NOW  3. Call your doctor NOW  4. If you are still in the Red Zone after 20 minutes and you have not reached your doctor:    Take your rescue medicine again and    Call 911 or go to the emergency room right away    See your regular doctor within 2 weeks of an  Emergency Room or Urgent Care visit for follow-up treatment.          Annual Reminders:  Meet with Asthma Educator,  Flu Shot in the Fall, consider Pneumonia Vaccination for patients with asthma (aged 19 and older).    Pharmacy: [facility] [place], [place] - [address]                      Asthma Triggers  How To Control Things That Make Your Asthma Worse    Triggers are things that make your asthma worse.  Look at the list below to help you find your triggers and what you can do about them.  You can help prevent asthma flare-ups by staying away from your triggers.      Trigger                                                          What you can do   Cigarette Smoke  Tobacco smoke can make asthma worse. Do not allow smoking in your home, car or around you.  Be sure no one smokes at a child s day care or school.  If you smoke, ask your health care provider for ways to help you quit.  Ask family members to quit too.  Ask your health care provider for a referral to Quit Plan to help you quit smoking, or call 4-124-200-PLAN.     Colds, Flu, Bronchitis  These are common triggers of asthma. Wash your hands often.  Don t touch your eyes, nose or mouth.  Get a flu shot every year.     Dust Mites  These are tiny bugs that live in cloth or carpet. They are too small to see. Wash sheets and blankets in hot water every week.   Encase pillows and mattress in dust mite proof covers.  Avoid having carpet if you can. If you have carpet, vacuum weekly.   Use a dust mask and HEPA vacuum.   Pollen and Outdoor Mold  Some people are allergic to trees, grass, or weed pollen, or molds. Try to keep your windows closed.  Limit time out doors when pollen count is high.   Ask you health care provider about taking medicine during allergy season.     Animal Dander  Some people are allergic to skin flakes, urine or saliva from pets with fur or feathers. Keep pets with fur or feathers out of your home.    If you can t  keep the pet outdoors, then keep the pet out of your bedroom.  Keep the bedroom door closed.  Keep pets off cloth furniture and away from stuffed toys.     Mice, Rats, and Cockroaches  Some people are allergic to the waste from these pests.   Cover food and garbage.  Clean up spills and food crumbs.  Store grease in the refrigerator.   Keep food out of the bedroom.   Indoor Mold  This can be a trigger if your home has high moisture. Fix leaking faucets, pipes, or other sources of water.   Clean moldy surfaces.  Dehumidify basement if it is damp and smelly.   Smoke, Strong Odors, and Sprays  These can reduce air quality. Stay away from strong odors and sprays, such as perfume, powder, hair spray, paints, smoke incense, paint, cleaning products, candles and new carpet.   Exercise or Sports  Some people with asthma have this trigger. Be active!  Ask your doctor about taking medicine before sports or exercise to prevent symptoms.    Warm up for 5-10 minutes before and after sports or exercise.     Other Triggers of Asthma  Cold air:  Cover your nose and mouth with a scarf.  Sometimes laughing or crying can be a trigger.  Some medicines and food can trigger asthma.

## 2018-11-19 NOTE — TELEPHONE ENCOUNTER
Panel Management Review      Patient has the following on her problem list:     Depression / Dysthymia review    Measure:  Needs PHQ-9 score of 4 or less during index window.  Administer PHQ-9 and if score is 5 or more, send encounter to provider for next steps.      PHQ-9 SCORE 5/16/2017 11/9/2017 4/12/2018   Total Score - - -   Total Score MyChart - 14 (Moderate depression) 8 (Mild depression)   Total Score 6 14 8       If PHQ-9 recheck is 5 or more, route to provider for next steps.    Patient is due for:  PHQ9 and DAP    Asthma review     ACT Total Scores 1/11/2018   ACT TOTAL SCORE -   ASTHMA ER VISITS -   ASTHMA HOSPITALIZATIONS -   ACT TOTAL SCORE (Goal Greater than or Equal to 20) 20   In the past 12 months, how many times did you visit the emergency room for your asthma without being admitted to the hospital? 0   In the past 12 months, how many times were you hospitalized overnight because of your asthma? 0      1. Is Asthma diagnosis on the Problem List? Yes    2. Is Asthma listed on Health Maintenance? Yes    3. Patient is due for:  ACT      Composite cancer screening  Chart review shows that this patient is due/due soon for the following Pap Smear  Summary:    Patient is due/failing the following:   ACT, PAP and PHQ9    Action needed:   Patient needs office visit for asthma and depression follow up.    Type of outreach:    routed to panel pool for outreach    Questions for provider review:    None                                                                                                                                    Jonelle Quinn       Chart routed to Care Team .

## 2018-12-19 ENCOUNTER — HOSPITAL ENCOUNTER (OUTPATIENT)
Facility: CLINIC | Age: 33
LOS: 1 days | Discharge: HOME | End: 2018-12-19
Attending: OBSTETRICS & GYNECOLOGY | Admitting: OBSTETRICS & GYNECOLOGY
Payer: COMMERCIAL

## 2018-12-19 VITALS
SYSTOLIC BLOOD PRESSURE: 118 MMHG | DIASTOLIC BLOOD PRESSURE: 82 MMHG | BODY MASS INDEX: 29.41 KG/M2 | RESPIRATION RATE: 18 BRPM | TEMPERATURE: 98.3 F | WEIGHT: 166 LBS | HEART RATE: 137 BPM | HEIGHT: 63 IN

## 2018-12-19 PROCEDURE — 25000128 H RX IP 250 OP 636: Performed by: OBSTETRICS & GYNECOLOGY

## 2018-12-19 PROCEDURE — G0463 HOSPITAL OUTPT CLINIC VISIT: HCPCS

## 2018-12-19 PROCEDURE — 96365 THER/PROPH/DIAG IV INF INIT: CPT

## 2018-12-19 PROCEDURE — 40000809 ZZH STATISTIC NO DOCUMENTATION TO SUPPORT CHARGE

## 2018-12-19 RX ORDER — ONDANSETRON 2 MG/ML
4 INJECTION INTRAMUSCULAR; INTRAVENOUS EVERY 6 HOURS PRN
Status: DISCONTINUED | OUTPATIENT
Start: 2018-12-19 | End: 2018-12-19 | Stop reason: HOSPADM

## 2018-12-19 RX ORDER — DEXTROSE, SODIUM CHLORIDE, SODIUM LACTATE, POTASSIUM CHLORIDE, AND CALCIUM CHLORIDE 5; .6; .31; .03; .02 G/100ML; G/100ML; G/100ML; G/100ML; G/100ML
500 INJECTION, SOLUTION INTRAVENOUS ONCE
Status: COMPLETED | OUTPATIENT
Start: 2018-12-19 | End: 2018-12-19

## 2018-12-19 RX ORDER — DEXTROSE, SODIUM CHLORIDE, SODIUM LACTATE, POTASSIUM CHLORIDE, AND CALCIUM CHLORIDE 5; .6; .31; .03; .02 G/100ML; G/100ML; G/100ML; G/100ML; G/100ML
125 INJECTION, SOLUTION INTRAVENOUS CONTINUOUS
Status: DISCONTINUED | OUTPATIENT
Start: 2018-12-19 | End: 2018-12-19 | Stop reason: HOSPADM

## 2018-12-19 RX ORDER — DEXTROSE, SODIUM CHLORIDE, SODIUM LACTATE, POTASSIUM CHLORIDE, AND CALCIUM CHLORIDE 5; .6; .31; .03; .02 G/100ML; G/100ML; G/100ML; G/100ML; G/100ML
INJECTION, SOLUTION INTRAVENOUS CONTINUOUS
Status: DISCONTINUED | OUTPATIENT
Start: 2018-12-19 | End: 2018-12-19 | Stop reason: HOSPADM

## 2018-12-19 RX ADMIN — SODIUM CHLORIDE, SODIUM LACTATE, POTASSIUM CHLORIDE, CALCIUM CHLORIDE AND DEXTROSE MONOHYDRATE 1000 ML: 5; 600; 310; 30; 20 INJECTION, SOLUTION INTRAVENOUS at 10:45

## 2018-12-19 ASSESSMENT — ACTIVITIES OF DAILY LIVING (ADL)
TRANSFERRING: 0-->INDEPENDENT
BATHING: 0-->INDEPENDENT
RETIRED_COMMUNICATION: 0-->UNDERSTANDS/COMMUNICATES WITHOUT DIFFICULTY
FALL_HISTORY_WITHIN_LAST_SIX_MONTHS: NO
SWALLOWING: 0-->SWALLOWS FOODS/LIQUIDS WITHOUT DIFFICULTY
AMBULATION: 0-->INDEPENDENT
RETIRED_EATING: 0-->INDEPENDENT
COGNITION: 0 - NO COGNITION ISSUES REPORTED
TOILETING: 0-->INDEPENDENT
DRESS: 0-->INDEPENDENT

## 2018-12-19 ASSESSMENT — MIFFLIN-ST. JEOR: SCORE: 1432.1

## 2018-12-19 NOTE — PLAN OF CARE
Pt arrived to unit from clinic to evaluate for labor. Pt denies any LOF or VB. Reports positive fetal movement. Pt C/O irregular UC starting last evening. Monitors applied, Dr Henao updated on pt arrival, orders received for IV hydration and discharge home if no cervical change.

## 2018-12-19 NOTE — PLAN OF CARE
Dr Henao in unit, updated on pt status, strip reviewed. Discharge home. Pt has clear understanding of when to call the doctor with any concerns or S&S of labor. Pt states understanding of importance due to prior c section

## 2018-12-19 NOTE — DISCHARGE INSTRUCTIONS
Discharge Instruction for Undelivered Patients      You were seen for: Labor Assessment  We Consulted: Dr Henao  You had (Test or Medicine):SVE,IV hydration and monitoring      Diet:   Drink 8 to 12 glasses of liquids (milk, juice, water) every day.  You may eat meals and snacks.     Activity:  Call your doctor or nurse midwife if your baby is moving less than usual.     Call your provider if you notice:  Swelling in your face or increased swelling in your hands or legs.  Headaches that are not relieved by Tylenol (acetaminophen).  Changes in your vision (blurring: seeing spots or stars.)  Nausea (sick to your stomach) and vomiting (throwing up).   Weight gain of 5 pounds or more per week.  Heartburn that doesn't go away.  Signs of bladder infection: pain when you urinate (use the toilet), need to go more often and more urgently.  The bag of stanton (rupture of membranes) breaks, or you notice leaking in your underwear.  Bright red blood in your underwear.  Abdominal (lower belly) or stomach pain.  For first baby: Contractions (tightening) less than 5 minutes apart for one hour or more.  Second (plus) baby: Contractions (tightening) less than 10 minutes apart and getting stronger.  *If less than 34 weeks: Contractions (tightenings) more than 6 times in one hour.  Increase or change in vaginal discharge (note the color and amount)      Follow-up:  As scheduled in the clinic

## 2018-12-20 ENCOUNTER — ANESTHESIA EVENT (OUTPATIENT)
Dept: SURGERY | Facility: CLINIC | Age: 33
End: 2018-12-20
Payer: COMMERCIAL

## 2018-12-20 ENCOUNTER — HOSPITAL ENCOUNTER (INPATIENT)
Facility: CLINIC | Age: 33
LOS: 2 days | Discharge: HOME HEALTH | End: 2018-12-22
Attending: OBSTETRICS & GYNECOLOGY | Admitting: OBSTETRICS & GYNECOLOGY
Payer: COMMERCIAL

## 2018-12-20 ENCOUNTER — ANESTHESIA (OUTPATIENT)
Dept: SURGERY | Facility: CLINIC | Age: 33
End: 2018-12-20
Payer: COMMERCIAL

## 2018-12-20 DIAGNOSIS — Z98.891 S/P CESAREAN SECTION: Primary | ICD-10-CM

## 2018-12-20 PROBLEM — Z98.890 POST-OPERATIVE STATE: Status: ACTIVE | Noted: 2018-12-20

## 2018-12-20 LAB
ABO + RH BLD: NORMAL
ABO + RH BLD: NORMAL
AMPHETAMINES UR QL SCN: NEGATIVE
BLD GP AB SCN SERPL QL: NORMAL
BLOOD BANK CMNT PATIENT-IMP: NORMAL
CANNABINOIDS UR QL: ABNORMAL
COCAINE UR QL: NEGATIVE
HGB BLD-MCNC: 11.9 G/DL (ref 11.7–15.7)
OPIATES UR QL SCN: NEGATIVE
PCP UR QL SCN: NEGATIVE
SPECIMEN EXP DATE BLD: NORMAL

## 2018-12-20 PROCEDURE — 86850 RBC ANTIBODY SCREEN: CPT | Performed by: OBSTETRICS & GYNECOLOGY

## 2018-12-20 PROCEDURE — 36000056 ZZH SURGERY LEVEL 3 1ST 30 MIN: Performed by: OBSTETRICS & GYNECOLOGY

## 2018-12-20 PROCEDURE — 36000058 ZZH SURGERY LEVEL 3 EA 15 ADDTL MIN: Performed by: OBSTETRICS & GYNECOLOGY

## 2018-12-20 PROCEDURE — 37000009 ZZH ANESTHESIA TECHNICAL FEE, EACH ADDTL 15 MIN: Performed by: OBSTETRICS & GYNECOLOGY

## 2018-12-20 PROCEDURE — 27210794 ZZH OR GENERAL SUPPLY STERILE: Performed by: OBSTETRICS & GYNECOLOGY

## 2018-12-20 PROCEDURE — 86900 BLOOD TYPING SEROLOGIC ABO: CPT | Performed by: OBSTETRICS & GYNECOLOGY

## 2018-12-20 PROCEDURE — 25000125 ZZHC RX 250: Performed by: OBSTETRICS & GYNECOLOGY

## 2018-12-20 PROCEDURE — 25000125 ZZHC RX 250: Performed by: NURSE ANESTHETIST, CERTIFIED REGISTERED

## 2018-12-20 PROCEDURE — 25000132 ZZH RX MED GY IP 250 OP 250 PS 637: Performed by: OBSTETRICS & GYNECOLOGY

## 2018-12-20 PROCEDURE — 80307 DRUG TEST PRSMV CHEM ANLYZR: CPT | Performed by: OBSTETRICS & GYNECOLOGY

## 2018-12-20 PROCEDURE — 86780 TREPONEMA PALLIDUM: CPT | Performed by: OBSTETRICS & GYNECOLOGY

## 2018-12-20 PROCEDURE — 37000008 ZZH ANESTHESIA TECHNICAL FEE, 1ST 30 MIN: Performed by: OBSTETRICS & GYNECOLOGY

## 2018-12-20 PROCEDURE — 25000128 H RX IP 250 OP 636: Performed by: OBSTETRICS & GYNECOLOGY

## 2018-12-20 PROCEDURE — 12000029 ZZH R&B OB INTERMEDIATE

## 2018-12-20 PROCEDURE — 71000012 ZZH RECOVERY PHASE 1 LEVEL 1 FIRST HR: Performed by: OBSTETRICS & GYNECOLOGY

## 2018-12-20 PROCEDURE — 80349 CANNABINOIDS NATURAL: CPT | Performed by: OBSTETRICS & GYNECOLOGY

## 2018-12-20 PROCEDURE — 86901 BLOOD TYPING SEROLOGIC RH(D): CPT | Performed by: OBSTETRICS & GYNECOLOGY

## 2018-12-20 PROCEDURE — 85018 HEMOGLOBIN: CPT | Performed by: OBSTETRICS & GYNECOLOGY

## 2018-12-20 PROCEDURE — 25000128 H RX IP 250 OP 636: Performed by: NURSE ANESTHETIST, CERTIFIED REGISTERED

## 2018-12-20 RX ORDER — OXYTOCIN/0.9 % SODIUM CHLORIDE 30/500 ML
340 PLASTIC BAG, INJECTION (ML) INTRAVENOUS CONTINUOUS PRN
Status: DISCONTINUED | OUTPATIENT
Start: 2018-12-20 | End: 2018-12-23 | Stop reason: HOSPADM

## 2018-12-20 RX ORDER — OXYCODONE HYDROCHLORIDE 5 MG/1
5-10 TABLET ORAL
Status: DISCONTINUED | OUTPATIENT
Start: 2018-12-20 | End: 2018-12-23 | Stop reason: HOSPADM

## 2018-12-20 RX ORDER — OXYTOCIN/0.9 % SODIUM CHLORIDE 30/500 ML
PLASTIC BAG, INJECTION (ML) INTRAVENOUS
Status: DISCONTINUED
Start: 2018-12-20 | End: 2018-12-20 | Stop reason: HOSPADM

## 2018-12-20 RX ORDER — SIMETHICONE 80 MG
80 TABLET,CHEWABLE ORAL 4 TIMES DAILY PRN
Status: DISCONTINUED | OUTPATIENT
Start: 2018-12-20 | End: 2018-12-23 | Stop reason: HOSPADM

## 2018-12-20 RX ORDER — CITRIC ACID/SODIUM CITRATE 334-500MG
30 SOLUTION, ORAL ORAL
Status: COMPLETED | OUTPATIENT
Start: 2018-12-20 | End: 2018-12-20

## 2018-12-20 RX ORDER — ONDANSETRON 2 MG/ML
4 INJECTION INTRAMUSCULAR; INTRAVENOUS EVERY 30 MIN PRN
Status: DISCONTINUED | OUTPATIENT
Start: 2018-12-20 | End: 2018-12-20 | Stop reason: HOSPADM

## 2018-12-20 RX ORDER — ACETAMINOPHEN 325 MG/1
975 TABLET ORAL EVERY 8 HOURS
Status: DISCONTINUED | OUTPATIENT
Start: 2018-12-20 | End: 2018-12-23 | Stop reason: HOSPADM

## 2018-12-20 RX ORDER — LANOLIN 100 %
OINTMENT (GRAM) TOPICAL
Status: DISCONTINUED | OUTPATIENT
Start: 2018-12-20 | End: 2018-12-23 | Stop reason: HOSPADM

## 2018-12-20 RX ORDER — HYDROMORPHONE HYDROCHLORIDE 1 MG/ML
.3-.5 INJECTION, SOLUTION INTRAMUSCULAR; INTRAVENOUS; SUBCUTANEOUS EVERY 5 MIN PRN
Status: DISCONTINUED | OUTPATIENT
Start: 2018-12-20 | End: 2018-12-20 | Stop reason: HOSPADM

## 2018-12-20 RX ORDER — ACETAMINOPHEN 325 MG/1
975 TABLET ORAL ONCE
Status: DISCONTINUED | OUTPATIENT
Start: 2018-12-20 | End: 2018-12-20 | Stop reason: HOSPADM

## 2018-12-20 RX ORDER — ACETAMINOPHEN 325 MG/1
650 TABLET ORAL EVERY 4 HOURS PRN
Status: DISCONTINUED | OUTPATIENT
Start: 2018-12-23 | End: 2018-12-23 | Stop reason: HOSPADM

## 2018-12-20 RX ORDER — ONDANSETRON 4 MG/1
4 TABLET, ORALLY DISINTEGRATING ORAL EVERY 30 MIN PRN
Status: DISCONTINUED | OUTPATIENT
Start: 2018-12-20 | End: 2018-12-20 | Stop reason: HOSPADM

## 2018-12-20 RX ORDER — ONDANSETRON 2 MG/ML
4 INJECTION INTRAMUSCULAR; INTRAVENOUS EVERY 6 HOURS PRN
Status: DISCONTINUED | OUTPATIENT
Start: 2018-12-20 | End: 2018-12-20

## 2018-12-20 RX ORDER — BUPIVACAINE HYDROCHLORIDE 7.5 MG/ML
INJECTION, SOLUTION INTRASPINAL PRN
Status: DISCONTINUED | OUTPATIENT
Start: 2018-12-20 | End: 2018-12-20

## 2018-12-20 RX ORDER — HYDROCORTISONE 2.5 %
CREAM (GRAM) TOPICAL 3 TIMES DAILY PRN
Status: DISCONTINUED | OUTPATIENT
Start: 2018-12-20 | End: 2018-12-23 | Stop reason: HOSPADM

## 2018-12-20 RX ORDER — CEFAZOLIN SODIUM 2 G/100ML
2 INJECTION, SOLUTION INTRAVENOUS
Status: CANCELLED | OUTPATIENT
Start: 2018-12-20

## 2018-12-20 RX ORDER — SODIUM CHLORIDE, SODIUM LACTATE, POTASSIUM CHLORIDE, CALCIUM CHLORIDE 600; 310; 30; 20 MG/100ML; MG/100ML; MG/100ML; MG/100ML
INJECTION, SOLUTION INTRAVENOUS CONTINUOUS
Status: DISCONTINUED | OUTPATIENT
Start: 2018-12-20 | End: 2018-12-20 | Stop reason: HOSPADM

## 2018-12-20 RX ORDER — NALOXONE HYDROCHLORIDE 0.4 MG/ML
.1-.4 INJECTION, SOLUTION INTRAMUSCULAR; INTRAVENOUS; SUBCUTANEOUS
Status: DISCONTINUED | OUTPATIENT
Start: 2018-12-20 | End: 2018-12-20

## 2018-12-20 RX ORDER — DEXTROSE, SODIUM CHLORIDE, SODIUM LACTATE, POTASSIUM CHLORIDE, AND CALCIUM CHLORIDE 5; .6; .31; .03; .02 G/100ML; G/100ML; G/100ML; G/100ML; G/100ML
INJECTION, SOLUTION INTRAVENOUS CONTINUOUS
Status: DISCONTINUED | OUTPATIENT
Start: 2018-12-20 | End: 2018-12-23 | Stop reason: HOSPADM

## 2018-12-20 RX ORDER — OXYTOCIN/0.9 % SODIUM CHLORIDE 30/500 ML
PLASTIC BAG, INJECTION (ML) INTRAVENOUS PRN
Status: DISCONTINUED | OUTPATIENT
Start: 2018-12-20 | End: 2018-12-20

## 2018-12-20 RX ORDER — SODIUM CHLORIDE, SODIUM LACTATE, POTASSIUM CHLORIDE, CALCIUM CHLORIDE 600; 310; 30; 20 MG/100ML; MG/100ML; MG/100ML; MG/100ML
INJECTION, SOLUTION INTRAVENOUS CONTINUOUS
Status: CANCELLED | OUTPATIENT
Start: 2018-12-20

## 2018-12-20 RX ORDER — CLINDAMYCIN PHOSPHATE 900 MG/50ML
900 INJECTION, SOLUTION INTRAVENOUS
Status: DISCONTINUED | OUTPATIENT
Start: 2018-12-20 | End: 2018-12-20 | Stop reason: HOSPADM

## 2018-12-20 RX ORDER — AMOXICILLIN 250 MG
2 CAPSULE ORAL 2 TIMES DAILY PRN
Status: DISCONTINUED | OUTPATIENT
Start: 2018-12-20 | End: 2018-12-23 | Stop reason: HOSPADM

## 2018-12-20 RX ORDER — ALBUTEROL SULFATE 0.83 MG/ML
2.5 SOLUTION RESPIRATORY (INHALATION) EVERY 6 HOURS PRN
Status: DISCONTINUED | OUTPATIENT
Start: 2018-12-20 | End: 2018-12-23 | Stop reason: HOSPADM

## 2018-12-20 RX ORDER — NALOXONE HYDROCHLORIDE 0.4 MG/ML
.1-.4 INJECTION, SOLUTION INTRAMUSCULAR; INTRAVENOUS; SUBCUTANEOUS
Status: DISCONTINUED | OUTPATIENT
Start: 2018-12-20 | End: 2018-12-23 | Stop reason: HOSPADM

## 2018-12-20 RX ORDER — CLINDAMYCIN PHOSPHATE 900 MG/50ML
INJECTION, SOLUTION INTRAVENOUS PRN
Status: DISCONTINUED | OUTPATIENT
Start: 2018-12-20 | End: 2018-12-20

## 2018-12-20 RX ORDER — BISACODYL 10 MG
10 SUPPOSITORY, RECTAL RECTAL DAILY PRN
Status: DISCONTINUED | OUTPATIENT
Start: 2018-12-22 | End: 2018-12-23 | Stop reason: HOSPADM

## 2018-12-20 RX ORDER — ACETAMINOPHEN 325 MG/1
TABLET ORAL
Status: DISCONTINUED
Start: 2018-12-20 | End: 2018-12-20 | Stop reason: HOSPADM

## 2018-12-20 RX ORDER — LIDOCAINE 40 MG/G
CREAM TOPICAL
Status: DISCONTINUED | OUTPATIENT
Start: 2018-12-20 | End: 2018-12-23 | Stop reason: HOSPADM

## 2018-12-20 RX ORDER — IBUPROFEN 800 MG/1
800 TABLET, FILM COATED ORAL EVERY 6 HOURS PRN
Status: DISCONTINUED | OUTPATIENT
Start: 2018-12-20 | End: 2018-12-23 | Stop reason: HOSPADM

## 2018-12-20 RX ORDER — ONDANSETRON 2 MG/ML
4 INJECTION INTRAMUSCULAR; INTRAVENOUS EVERY 6 HOURS PRN
Status: DISCONTINUED | OUTPATIENT
Start: 2018-12-20 | End: 2018-12-23 | Stop reason: HOSPADM

## 2018-12-20 RX ORDER — AMOXICILLIN 250 MG
1 CAPSULE ORAL 2 TIMES DAILY PRN
Status: DISCONTINUED | OUTPATIENT
Start: 2018-12-20 | End: 2018-12-23 | Stop reason: HOSPADM

## 2018-12-20 RX ORDER — CITRIC ACID/SODIUM CITRATE 334-500MG
30 SOLUTION, ORAL ORAL
Status: CANCELLED | OUTPATIENT
Start: 2018-12-20

## 2018-12-20 RX ORDER — CITRIC ACID/SODIUM CITRATE 334-500MG
SOLUTION, ORAL ORAL
Status: DISCONTINUED
Start: 2018-12-20 | End: 2018-12-20 | Stop reason: HOSPADM

## 2018-12-20 RX ORDER — ALBUTEROL SULFATE 90 UG/1
2 AEROSOL, METERED RESPIRATORY (INHALATION) EVERY 6 HOURS PRN
Status: DISCONTINUED | OUTPATIENT
Start: 2018-12-20 | End: 2018-12-23 | Stop reason: HOSPADM

## 2018-12-20 RX ORDER — HYDROMORPHONE HYDROCHLORIDE 1 MG/ML
.3-.5 INJECTION, SOLUTION INTRAMUSCULAR; INTRAVENOUS; SUBCUTANEOUS EVERY 30 MIN PRN
Status: DISCONTINUED | OUTPATIENT
Start: 2018-12-20 | End: 2018-12-23 | Stop reason: HOSPADM

## 2018-12-20 RX ORDER — FENTANYL CITRATE 50 UG/ML
25-50 INJECTION, SOLUTION INTRAMUSCULAR; INTRAVENOUS
Status: DISCONTINUED | OUTPATIENT
Start: 2018-12-20 | End: 2018-12-20 | Stop reason: HOSPADM

## 2018-12-20 RX ORDER — EPHEDRINE SULFATE 50 MG/ML
INJECTION, SOLUTION INTRAVENOUS PRN
Status: DISCONTINUED | OUTPATIENT
Start: 2018-12-20 | End: 2018-12-20

## 2018-12-20 RX ORDER — LABETALOL HYDROCHLORIDE 5 MG/ML
10 INJECTION, SOLUTION INTRAVENOUS
Status: DISCONTINUED | OUTPATIENT
Start: 2018-12-20 | End: 2018-12-20 | Stop reason: HOSPADM

## 2018-12-20 RX ORDER — OXYTOCIN/0.9 % SODIUM CHLORIDE 30/500 ML
100 PLASTIC BAG, INJECTION (ML) INTRAVENOUS CONTINUOUS
Status: DISCONTINUED | OUTPATIENT
Start: 2018-12-20 | End: 2018-12-23 | Stop reason: HOSPADM

## 2018-12-20 RX ORDER — OXYTOCIN 10 [USP'U]/ML
10 INJECTION, SOLUTION INTRAMUSCULAR; INTRAVENOUS
Status: DISCONTINUED | OUTPATIENT
Start: 2018-12-20 | End: 2018-12-23 | Stop reason: HOSPADM

## 2018-12-20 RX ORDER — CEFAZOLIN SODIUM 1 G/3ML
1 INJECTION, POWDER, FOR SOLUTION INTRAMUSCULAR; INTRAVENOUS SEE ADMIN INSTRUCTIONS
Status: CANCELLED | OUTPATIENT
Start: 2018-12-20

## 2018-12-20 RX ORDER — MORPHINE SULFATE 1 MG/ML
INJECTION, SOLUTION EPIDURAL; INTRATHECAL; INTRAVENOUS PRN
Status: DISCONTINUED | OUTPATIENT
Start: 2018-12-20 | End: 2018-12-20

## 2018-12-20 RX ADMIN — PHENYLEPHRINE HYDROCHLORIDE 100 MCG: 10 INJECTION, SOLUTION INTRAMUSCULAR; INTRAVENOUS; SUBCUTANEOUS at 19:55

## 2018-12-20 RX ADMIN — ONDANSETRON 4 MG: 2 INJECTION INTRAMUSCULAR; INTRAVENOUS at 19:45

## 2018-12-20 RX ADMIN — PHENYLEPHRINE HYDROCHLORIDE 100 MCG: 10 INJECTION, SOLUTION INTRAMUSCULAR; INTRAVENOUS; SUBCUTANEOUS at 20:06

## 2018-12-20 RX ADMIN — GENTAMICIN SULFATE 120 MG: 40 INJECTION, SOLUTION INTRAMUSCULAR; INTRAVENOUS at 20:11

## 2018-12-20 RX ADMIN — SODIUM CITRATE AND CITRIC ACID MONOHYDRATE 30 ML: 500; 334 SOLUTION ORAL at 19:06

## 2018-12-20 RX ADMIN — PHENYLEPHRINE HYDROCHLORIDE 100 MCG: 10 INJECTION, SOLUTION INTRAMUSCULAR; INTRAVENOUS; SUBCUTANEOUS at 19:59

## 2018-12-20 RX ADMIN — BUPIVACAINE HYDROCHLORIDE IN DEXTROSE 12 MG: 7.5 INJECTION, SOLUTION SUBARACHNOID at 19:39

## 2018-12-20 RX ADMIN — ACETAMINOPHEN 975 MG: 325 TABLET, FILM COATED ORAL at 22:15

## 2018-12-20 RX ADMIN — PHENYLEPHRINE HYDROCHLORIDE 100 MCG: 10 INJECTION, SOLUTION INTRAMUSCULAR; INTRAVENOUS; SUBCUTANEOUS at 20:08

## 2018-12-20 RX ADMIN — PHENYLEPHRINE HYDROCHLORIDE 100 MCG: 10 INJECTION, SOLUTION INTRAMUSCULAR; INTRAVENOUS; SUBCUTANEOUS at 19:51

## 2018-12-20 RX ADMIN — PHENYLEPHRINE HYDROCHLORIDE 100 MCG: 10 INJECTION, SOLUTION INTRAMUSCULAR; INTRAVENOUS; SUBCUTANEOUS at 20:45

## 2018-12-20 RX ADMIN — EPHEDRINE SULFATE 5 MG: 50 INJECTION, SOLUTION INTRAVENOUS at 20:02

## 2018-12-20 RX ADMIN — MORPHINE SULFATE 0.2 MG: 1 INJECTION, SOLUTION EPIDURAL; INTRATHECAL; INTRAVENOUS at 19:39

## 2018-12-20 RX ADMIN — OXYTOCIN-SODIUM CHLORIDE 0.9% IV SOLN 30 UNIT/500ML 100 ML/HR: 30-0.9/5 SOLUTION at 21:17

## 2018-12-20 RX ADMIN — CLINDAMYCIN PHOSPHATE 900 MG: 18 INJECTION, SOLUTION INTRAVENOUS at 20:06

## 2018-12-20 RX ADMIN — PHENYLEPHRINE HYDROCHLORIDE 200 MCG: 10 INJECTION, SOLUTION INTRAMUSCULAR; INTRAVENOUS; SUBCUTANEOUS at 19:41

## 2018-12-20 RX ADMIN — SODIUM CHLORIDE, POTASSIUM CHLORIDE, SODIUM LACTATE AND CALCIUM CHLORIDE: 600; 310; 30; 20 INJECTION, SOLUTION INTRAVENOUS at 19:58

## 2018-12-20 RX ADMIN — OXYTOCIN-SODIUM CHLORIDE 0.9% IV SOLN 30 UNIT/500ML 500 ML: 30-0.9/5 SOLUTION at 20:13

## 2018-12-20 RX ADMIN — PHENYLEPHRINE HYDROCHLORIDE 100 MCG: 10 INJECTION, SOLUTION INTRAMUSCULAR; INTRAVENOUS; SUBCUTANEOUS at 19:46

## 2018-12-20 RX ADMIN — SODIUM CHLORIDE, POTASSIUM CHLORIDE, SODIUM LACTATE AND CALCIUM CHLORIDE: 600; 310; 30; 20 INJECTION, SOLUTION INTRAVENOUS at 19:37

## 2018-12-20 ASSESSMENT — ENCOUNTER SYMPTOMS: DYSRHYTHMIAS: 0

## 2018-12-20 ASSESSMENT — MIFFLIN-ST. JEOR: SCORE: 1432.1

## 2018-12-21 LAB
CANNABINOIDS UR CFM-MCNC: 814 NG/ML
CARBOXYTHC/CREAT UR: 532 NG/MG{CREAT}
CREAT UR-MCNC: 153 MG/DL
HGB BLD-MCNC: 11.9 G/DL (ref 11.7–15.7)
T PALLIDUM AB SER QL: NONREACTIVE

## 2018-12-21 PROCEDURE — 12000029 ZZH R&B OB INTERMEDIATE

## 2018-12-21 PROCEDURE — 25000128 H RX IP 250 OP 636: Performed by: OBSTETRICS & GYNECOLOGY

## 2018-12-21 PROCEDURE — 36415 COLL VENOUS BLD VENIPUNCTURE: CPT | Performed by: OBSTETRICS & GYNECOLOGY

## 2018-12-21 PROCEDURE — 85018 HEMOGLOBIN: CPT | Performed by: OBSTETRICS & GYNECOLOGY

## 2018-12-21 PROCEDURE — 25000132 ZZH RX MED GY IP 250 OP 250 PS 637: Performed by: OBSTETRICS & GYNECOLOGY

## 2018-12-21 RX ORDER — SERTRALINE HYDROCHLORIDE 100 MG/1
100 TABLET, FILM COATED ORAL AT BEDTIME
Status: DISCONTINUED | OUTPATIENT
Start: 2018-12-21 | End: 2018-12-23 | Stop reason: HOSPADM

## 2018-12-21 RX ADMIN — SODIUM CHLORIDE, SODIUM LACTATE, POTASSIUM CHLORIDE, CALCIUM CHLORIDE AND DEXTROSE MONOHYDRATE: 5; 600; 310; 30; 20 INJECTION, SOLUTION INTRAVENOUS at 02:00

## 2018-12-21 RX ADMIN — SENNOSIDES AND DOCUSATE SODIUM 2 TABLET: 8.6; 5 TABLET ORAL at 22:04

## 2018-12-21 RX ADMIN — ACETAMINOPHEN 975 MG: 325 TABLET, FILM COATED ORAL at 22:04

## 2018-12-21 RX ADMIN — SENNOSIDES AND DOCUSATE SODIUM 1 TABLET: 8.6; 5 TABLET ORAL at 08:48

## 2018-12-21 RX ADMIN — ACETAMINOPHEN 975 MG: 325 TABLET, FILM COATED ORAL at 06:59

## 2018-12-21 RX ADMIN — IBUPROFEN 800 MG: 800 TABLET, FILM COATED ORAL at 08:48

## 2018-12-21 RX ADMIN — SERTRALINE HYDROCHLORIDE 100 MG: 100 TABLET ORAL at 22:04

## 2018-12-21 RX ADMIN — ACETAMINOPHEN 975 MG: 325 TABLET, FILM COATED ORAL at 14:02

## 2018-12-21 RX ADMIN — OXYCODONE HYDROCHLORIDE 5 MG: 5 TABLET ORAL at 20:09

## 2018-12-21 RX ADMIN — SIMETHICONE CHEW TAB 80 MG 80 MG: 80 TABLET ORAL at 20:11

## 2018-12-21 RX ADMIN — IBUPROFEN 800 MG: 800 TABLET, FILM COATED ORAL at 01:59

## 2018-12-21 RX ADMIN — IBUPROFEN 800 MG: 800 TABLET, FILM COATED ORAL at 15:19

## 2018-12-21 RX ADMIN — OXYCODONE HYDROCHLORIDE 5 MG: 5 TABLET ORAL at 12:52

## 2018-12-21 ASSESSMENT — COLUMBIA-SUICIDE SEVERITY RATING SCALE - C-SSRS
6. HAVE YOU EVER DONE ANYTHING, STARTED TO DO ANYTHING, OR PREPARED TO DO ANYTHING TO END YOUR LIFE?: NO
1. IN THE PAST MONTH, HAVE YOU WISHED YOU WERE DEAD OR WISHED YOU COULD GO TO SLEEP AND NOT WAKE UP?: NO
2. HAVE YOU ACTUALLY HAD ANY THOUGHTS OF KILLING YOURSELF IN THE PAST MONTH?: NO

## 2018-12-21 NOTE — H&P
"OB  Admit H&P    No significant change in general health status based on examination of the patient, review of Nursing Admission Database and prenatal record.    Pt is a 33 year old  @ Unknown who presented to L&D with regular strong uterine contractions.    Patient's prenatal course has been complicated by a previous low segment transverse  section, repeat  section planned on 18.  History of depression/anxiety.  Initial low lying placenta, resolved.     Prenatal Labs:    Blood type A positive  Rubella immune  [id number]  GBS negative    Medical History:  1. Abnormal PAP smear, allergic rhinitis, anxiety, asthma, bipolar disorder, depression, erythrocytosis, history of back pain, irregular menses, kidney infection, migraines, panic disorder, sleep apnea, tobacco use    Surgical History:  1.  section, cholecystectomy, colposcopy, D and C, wisdom teeth    OB History:  1.  section in , 39 weeks, failure to progress  2.  Miscarriage/vacuum curettage     Medications:   1. Prenatal vitamins  2.  Zoloft    Allergies:   1. Ceclor  2.  Seasonal allergies    ROS:  Respiratory: No shortness of breath, dyspnea on exertion, cough, or hemoptysis  Cardiovascular: negative  Gastrointestinal: negative  Musculoskeletal: negative  Neurologic: negative  Psychiatric: negative      Physical Exam:  BP (!) 136/92   Temp 98.9  F (37.2  C) (Oral)   Resp 20   Ht 1.6 m (5' 3\")   Wt 75.3 kg (166 lb)   BMI 29.41 kg/m    Gen:  A&O, NAD  Lungs:  CTAB  CV:  RRR, no murmur  Abd:  Gravid, EFW 7#  EFM:  Contractions every 1.5-5 min  [place]: ,  Moderate variability  SVE: 3/70%/-3  Membranes:  intact    Assessment:  33 year old  @ 37 6/7 weeks gestation, scheduled for a repeat C/S on 18. Now in labor with regular contractions and cervical change.  GBS negative    Plan:  1. Proceed to OR for  delivery due to labor and a scheduled repeat C/S planned.   2. Risks, " benefits and alternatives were discussed with patient including, but not limited to, infection, bleeding, injury to surrounding structures.  Pt signed informed consent to proceed.  3.  Administer Clindamycin and Gentamicin per protocol for labor prior to C/S.  Also, administer Azithromycin as recommended.  4. All questions were answered.  The patient and her  agreed to the plan of care.     Jose Tyler  12/20/2018  7:22 PM

## 2018-12-21 NOTE — ANESTHESIA PROCEDURE NOTES
Peripheral nerve/Neuraxial procedure note : intrathecal  Pre-Procedure  Performed by Teja Tim MD  Referred by CHRISTY  Location: OR      Pre-Anesthestic Checklist: patient identified, IV checked, risks and benefits discussed, informed consent, monitors and equipment checked, pre-op evaluation and at physician/surgeon's request    Timeout  Correct Patient: Yes   Correct Procedure: Yes   Correct Site: Yes   Correct Laterality: Yes   Correct Position: Yes   Site Marked: Yes   .   Procedure Documentation  ASA 2  .    Procedure:    Intrathecal.   (midline approach)      Patient Prep;povidone-iodine 7.5% surgical scrub.  .  Needle: Flora tip Spinal Needle (gauge): 25  Spinal/LP Needle Length (inches): 3 # of attempts: 1 and # of redirects:  Introducer used Introducer: 20 G .       Assessment/Narrative  Paresthesias: No.  .  .  clear CSF fluid removed .

## 2018-12-21 NOTE — PLAN OF CARE
Data: Jonelle Khan transferred to room 423 via cart at 2300. Baby transferred via parent's arms.  Action: Receiving unit notified of transfer: Yes. Patient and family notified of room change. Report given to Munira OH RN at 2320. Belongings sent to receiving unit. Accompanied by Registered Nurse. Oriented patient to surroundings. Call light within reach. ID bands double-checked with receiving RN.  Response: Patient tolerated transfer and is stable.

## 2018-12-21 NOTE — OP NOTE
OB  Brief Operative Note    1.  Pre-op diagnosis-  33 year old  IUP at 37w5d                            Previous low segment transverse  section, planned repeat C/S                                      Labor    2.  Post-op diagnosis-  Same                                       Delivered    3.  Procedure- Repeat Low Transverse  Section via Pfannenstiel skin incision with 2-layer uterine closure    4.  Surgeon- Jose Tyler MD        6.  QBL- 219 cc      9.  Complications- None apparent     10.  Anesthesia- Spinal with duramorph    11.  Findings-  Viable Female infant delivered on 2018 at 2011 hours from OA presentation.  Apgars 5,5,7,8 at one minute, five minutes, ten minutes and 15 minutes  Weight 6 lbs 6 oz.  Thin, lightly meconium stained amniotic fluid  Placenta appeared grossly normal.  Normal appearing uterus, tubes and ovaries.    Jose Tyler   2018  9:02 PM

## 2018-12-21 NOTE — ANESTHESIA CARE TRANSFER NOTE
Patient: Jonelle Khan    Procedure(s):  REPEAT  SECTION    Diagnosis: Previous  Diagnosis Additional Information: No value filed.    Anesthesia Type:   Spinal     Note:  Airway :Room Air  Patient transferred to:Labor and Delivery  Comments: To L/D, report to RN.      Vitals: (Last set prior to Anesthesia Care Transfer)    CRNA VITALS  2018 - 2018             Pulse:  81    SpO2:  100 %                Electronically Signed By: JUAN Gonzales CRNA  2018  9:08 PM

## 2018-12-21 NOTE — PLAN OF CARE
Stable patient, vitals and postpartum checks are WNL. Taking Ibuprofen and Tylenol for pain, relief stated, ice pk also applied to incision. Incision site is intact with small drainage, area marked. Cervantes in place and is patent with adequate UOP during the night. Pt is breastfeeding and requires minimal assistant with postioning and latch. Continue to monitor.Pt was able to tolerate sitting up at bedside.Spouse present and supportive bonding well with infant.

## 2018-12-21 NOTE — ANESTHESIA PREPROCEDURE EVALUATION
Anesthesia Pre-Procedure Evaluation    Patient: Jonelle Khan   MRN: [id number] : 1985          Preoperative Diagnosis: Previous    Procedure(s):  REPEAT  SECTION    Past Medical History:   Diagnosis Date     Anxiety      Asthma      Depressive disorder      Kidney infection      LSIL (low grade squamous intraepithelial lesion) on Pap smear 7/20/10    resolved     Mild major depression (H)      Sleep apnea      Past Surgical History:   Procedure Laterality Date     C NONSPECIFIC PROCEDURE  age 10     chalazion removal under general       SECTION  12/10/2013    Procedure:  SECTION;   Section for failure to progress. ;  Surgeon: Monique Can MD;  Location:  L+D     LAPAROSCOPIC CHOLECYSTECTOMY N/A 2015    Procedure: LAPAROSCOPIC CHOLECYSTECTOMY;  Surgeon: Yuliet Hutton MD;  Location:  OR     Anesthesia Evaluation     .             ROS/MED HX    ENT/Pulmonary:     (+)sleep apnea, asthma , . .    Neurologic:  - neg neurologic ROS     Cardiovascular:        (-) hypertension, CAD, arrhythmias, pulmonary hypertension and dyslipidemia   METS/Exercise Tolerance:     Hematologic:        (-) anemia   Musculoskeletal:  - neg musculoskeletal ROS       GI/Hepatic:     (+) GERD      (-) hepatitis   Renal/Genitourinary:     (+) chronic renal disease,       Endo:      (-) Type I DM, Type II DM, thyroid disease, chronic steroid usage, other endocrine disorder and obesity   Psychiatric:     (+) psychiatric history bipolar      Infectious Disease:  - neg infectious disease ROS       Malignancy:      - no malignancy   Other:    - neg other ROS                      Physical Exam      Airway   Mallampati: II  TM distance: >3 FB  Neck ROM: full    Dental     Cardiovascular   Rhythm and rate: regular and normal  (-) no murmur    Pulmonary    breath sounds clear to auscultation    Other findings: Lab Test        18                        1840          1124          1513          0620          WBC           --          7.1          10.1         9.7           HGB          11.9         14.4         14.1         12.9          MCV           --          92           93           91            PLT           --          201          220          175            Lab Test        12/23/17 01/07/17 01/06/17                       1124          0620          1100          NA           138          142          142           POTASSIUM    4.1          3.5          3.8           CHLORIDE     106          110*         109           CO2          27 24 23            BUN          8            7            10            CR           0.58         0.59         0.75          ANIONGAP     5            8            10            AASHISH          9.1          8.6          8.8           GLC          84           89           196*                Lab Results   Component Value Date    WBC 7.1 12/23/2017    HGB 11.9 12/20/2018    HCT 43.4 12/23/2017     12/23/2017    CRP <5.0 05/18/2011    SED 7 05/18/2011     12/23/2017    POTASSIUM 4.1 12/23/2017    CHLORIDE 106 12/23/2017    CO2 27 12/23/2017    BUN 8 12/23/2017    CR 0.58 12/23/2017    GLC 84 12/23/2017    AASHISH 9.1 12/23/2017    ALBUMIN 4.3 12/23/2017    PROTTOTAL 7.9 12/23/2017    ALT 23 12/23/2017    AST 16 12/23/2017    ALKPHOS 54 12/23/2017    BILITOTAL 0.5 12/23/2017    LIPASE 105 01/06/2017    TSH 2.15 12/23/2017    HCG Negative 11/11/2015    HCGS Negative 01/06/2017       Preop Vitals  BP Readings from Last 3 Encounters:   12/20/18 (!) 136/92   12/19/18 118/82   04/12/18 110/74    Pulse Readings from Last 3 Encounters:   12/19/18 137   04/12/18 76   12/23/17 114      Resp Readings from Last 3 Encounters:   12/20/18 20   12/19/18 18   04/12/18 14    SpO2 Readings from Last 3 Encounters:   04/12/18 98%   12/23/17 99%   12/07/17 99%      Temp Readings from Last 1  "Encounters:   12/20/18 98.9  F (37.2  C) (Oral)    Ht Readings from Last 1 Encounters:   12/20/18 1.6 m (5' 3\")      Wt Readings from Last 1 Encounters:   12/20/18 75.3 kg (166 lb)    Estimated body mass index is 29.41 kg/m  as calculated from the following:    Height as of this encounter: 1.6 m (5' 3\").    Weight as of this encounter: 75.3 kg (166 lb).       Anesthesia Plan      History & Physical Review  History and physical reviewed and following examination; no interval change.    ASA Status:  2 emergent.    NPO Status:  > 8 hours    Plan for Spinal   PONV prophylaxis:  Ondansetron (or other 5HT-3) and Dexamethasone or Solumedrol       Postoperative Care  Postoperative pain management:  IV analgesics, Oral pain medications and Neuraxial analgesia.      Consents  Anesthetic plan, risks, benefits and alternatives discussed with: .  Use of blood products discussed: Yes.   Use of blood products discussed with Patient. Consented to blood products.  .                 Teja Tim MD                    .  "

## 2018-12-21 NOTE — PLAN OF CARE
Late entry    Data: Patient presented to [facility]: 2018  6:04 PM.  Reason for maternal/fetal assessment is uterine contractions. Patient reports ucs every 5 min.  Patient is a .  Prenatal record reviewed. Pregnancy has been uncomplicated..  Gestational Age 37w5d. VSS. Fetal movement present. Patient denies vaginal bleeding, pelvic pressure, nausea, vomiting, headache, visual disturbances, epigastric or URQ pain, significant edema. Support person is present.   Action: Verbal consent for EFM. Triage assessment completed. Bill of rights reviewed.  Response: Patient verbalized agreement with plan. Will contact Dr Jose Tyler with update and further orders.

## 2018-12-21 NOTE — ANESTHESIA POSTPROCEDURE EVALUATION
Patient: Jonelle Khan    Procedure(s):  REPEAT  SECTION    Diagnosis:Previous  Diagnosis Additional Information: 1.  Pre-op diagnosis-  33 year old  IUP at 37w5d                                       Previous low segment transverse  section, planned repeat C/S                                      Labor     2.  Post-op diagnosis-  Same            ,                            Delivered     3.  Procedure- Repeat Low Transverse  Section via Pfannenstiel skin incision with 2-layer uterine closure        Anesthesia Type:  Spinal    Note:  Anesthesia Post Evaluation    Patient location during evaluation: PACU  Patient participation: Able to participate in evaluation but full recovery from regional anesthesia has not yet ocurrred but is anticipated to occur within 48 hours  Level of consciousness: awake  Pain management: adequate  Airway patency: patent  Cardiovascular status: acceptable  Respiratory status: acceptable  Hydration status: euvolemic  PONV: controlled     Anesthetic complications: None          Last vitals:  Vitals:    18 0100 18 0200 18 0300   BP: 115/58 124/69 119/75   Pulse: 62 61 69   Resp: 16 18 18   Temp:   98.5  F (36.9  C)   SpO2: 98% 100% 100%         Electronically Signed By: Teja Tim MD  2018  3:48 AM

## 2018-12-21 NOTE — PROGRESS NOTES
"Post-partum Note    Name:  Jonelle Khan  MRN: [id number]    S: Patient is doing well this morning.  Pain is well-controlled.  Tolerating clears, just ordered breakfast. Has not yet ambulated.  Lochia within normal range.  Breast feeding.      O:   Patient Vitals for the past 24 hrs:   BP Temp Temp src Pulse Resp SpO2 Height Weight   12/21/18 0424 129/53 -- -- 65 18 99 % -- --   12/21/18 0300 119/75 98.5  F (36.9  C) Oral 69 18 100 % -- --   12/21/18 0200 124/69 -- -- 61 18 100 % -- --   12/21/18 0100 115/58 -- -- 62 16 98 % -- --   12/21/18 0000 118/74 -- -- 63 16 99 % -- --   12/20/18 2300 111/70 98  F (36.7  C) -- -- 16 98 % -- --   12/20/18 2249 130/58 -- -- -- 16 -- -- --   12/20/18 2240 -- -- -- -- -- 100 % -- --   12/20/18 2234 128/61 -- -- -- -- -- -- --   12/20/18 2222 97/63 -- -- -- -- -- -- --   12/20/18 2203 98/66 -- -- -- 16 99 % -- --   12/20/18 2153 -- -- -- -- -- 99 % -- --   12/20/18 2148 110/56 -- -- -- -- 98 % -- --   12/20/18 2143 114/56 -- -- -- -- 98 % -- --   12/20/18 2140 -- -- -- -- -- 97 % -- --   12/20/18 2132 100/59 -- -- -- -- -- -- --   12/20/18 2128 96/53 -- -- -- -- 96 % -- --   12/20/18 2112 96/61 -- -- 63 -- -- -- --   12/20/18 2110 -- 97.5  F (36.4  C) Oral -- 16 96 % -- --   12/20/18 2106 94/48 -- -- -- -- -- -- --   12/20/18 2104 (!) 89/50 -- -- 73 -- -- -- --   12/20/18 1850 -- -- -- -- 20 -- -- --   12/20/18 1821 -- 98.9  F (37.2  C) Oral -- 18 -- 1.6 m (5' 3\") 75.3 kg (166 lb)   12/20/18 1816 (!) 136/92 -- -- -- -- -- -- --     Gen:  Resting comfortably, NAD  Pulm:  Breathing comfortably on room air  Abd:  Soft, appropriately ttp, non-distended.Fundus at umbilicus, firm and non-tender.  Incision: c/d/i with bandage overlying. Small amount of dried serosanguinous drainage, no active bleeding.  Ext:  non-tender, no bilateral LE edema    I/O last 3 completed shifts:  In: 2700 [P.O.:300; I.V.:2400]  Out: 1319 [Urine:1100; Blood:219]    Hgb:   Hemoglobin   Date Value Ref Range " Status   2018 11.9 11.7 - 15.7 g/dL Final       Assessment/Plan:  33 year old  on POD #1 s/p RLTCS.  1. Continue with routine postpartum management  2. Continue current pain management  3. Hx bipolar disorder/depression: continue home dose of zoloft  4. Rh: Positive  5. Feed: Breastfeeding  Dispo: PR home POD#2-3.     Tatianna Colon MD  [facility]  2018, 7:53 AM

## 2018-12-21 NOTE — PROGRESS NOTES
Patient unavailable as was sleeping when Public Health Nurse (PHN) stopped by to discuss [facility] Public Health resources.

## 2018-12-21 NOTE — PLAN OF CARE
Pt vss. Up x1 to bathroom after denny removed, voided 30cc. Pain controlled with oral pain meds.  Tolerating regular diet. Old drainage marked on abd dressing.  Hgb 11.9 today.  Encourage ambulation.  aware of positive drug screen, will speak to patient this pm.

## 2018-12-22 VITALS
HEIGHT: 63 IN | TEMPERATURE: 98.7 F | HEART RATE: 106 BPM | BODY MASS INDEX: 29.41 KG/M2 | SYSTOLIC BLOOD PRESSURE: 126 MMHG | WEIGHT: 166 LBS | DIASTOLIC BLOOD PRESSURE: 66 MMHG | OXYGEN SATURATION: 99 % | RESPIRATION RATE: 18 BRPM

## 2018-12-22 PROBLEM — Z98.891 S/P CESAREAN SECTION: Status: ACTIVE | Noted: 2018-12-22

## 2018-12-22 PROCEDURE — 25000132 ZZH RX MED GY IP 250 OP 250 PS 637: Performed by: OBSTETRICS & GYNECOLOGY

## 2018-12-22 RX ORDER — ACETAMINOPHEN 325 MG/1
650 TABLET ORAL EVERY 4 HOURS PRN
COMMUNITY
Start: 2018-12-23 | End: 2019-02-11

## 2018-12-22 RX ORDER — AMOXICILLIN 250 MG
1 CAPSULE ORAL 2 TIMES DAILY PRN
Qty: 100 TABLET | COMMUNITY
Start: 2018-12-22 | End: 2019-02-11

## 2018-12-22 RX ORDER — OXYCODONE HYDROCHLORIDE 5 MG/1
5 TABLET ORAL EVERY 6 HOURS PRN
Qty: 12 TABLET | Refills: 0 | Status: SHIPPED | OUTPATIENT
Start: 2018-12-22 | End: 2019-02-11

## 2018-12-22 RX ORDER — IBUPROFEN 800 MG/1
800 TABLET, FILM COATED ORAL EVERY 6 HOURS PRN
COMMUNITY
Start: 2018-12-22 | End: 2019-02-11

## 2018-12-22 RX ADMIN — SENNOSIDES AND DOCUSATE SODIUM 1 TABLET: 8.6; 5 TABLET ORAL at 19:15

## 2018-12-22 RX ADMIN — ACETAMINOPHEN 975 MG: 325 TABLET, FILM COATED ORAL at 13:52

## 2018-12-22 RX ADMIN — IBUPROFEN 800 MG: 800 TABLET, FILM COATED ORAL at 01:24

## 2018-12-22 RX ADMIN — SENNOSIDES AND DOCUSATE SODIUM 2 TABLET: 8.6; 5 TABLET ORAL at 08:58

## 2018-12-22 RX ADMIN — SIMETHICONE CHEW TAB 80 MG 80 MG: 80 TABLET ORAL at 10:47

## 2018-12-22 RX ADMIN — IBUPROFEN 800 MG: 800 TABLET, FILM COATED ORAL at 08:58

## 2018-12-22 RX ADMIN — SIMETHICONE CHEW TAB 80 MG 80 MG: 80 TABLET ORAL at 01:38

## 2018-12-22 RX ADMIN — IBUPROFEN 800 MG: 800 TABLET, FILM COATED ORAL at 16:08

## 2018-12-22 RX ADMIN — ACETAMINOPHEN 975 MG: 325 TABLET, FILM COATED ORAL at 05:33

## 2018-12-22 NOTE — PLAN OF CARE
Patient is stable and afebrile. Breastfeeding with minimal help, supplementing with formula 5-7 ml. Ambulating, Independent. Using abd binder. Pain controlled with Ibuprofen, Tylenol, and Oxy. Incision open to air, CDI. No edema. Bonding with baby well. Involved in cares. Education is provided. Continue to monitor and assist per pt care plan and needs.  is at bedside.

## 2018-12-22 NOTE — PROGRESS NOTES
"OB Post-op  Section Progress Note POD# 2    S:  Patient doing well.  Pain well controlled with oral pain medication.  Ambulating.  Tolerating reg diet.  No N/V.  Passing flatus.  Voiding.  Bleeding is normal.  Breastfeeding.    O:  /73   Pulse 106   Temp 98.5  F (36.9  C) (Oral)   Resp 18   Ht 1.6 m (5' 3\")   Wt 75.3 kg (166 lb)   SpO2 99%   Breastfeeding? Unknown   BMI 29.41 kg/m     BP during roundin/91    Gen- A&O, NAD  Abd- soft, non-tender, +BS, no rebound or guarding, fundus firm at umbilicus  Incision- C/D/I, steristrips in place  Ext- non-tender, no edema. Pneumoboots in place lower extremities    Hemoglobin   Date Value Ref Range Status   2018 11.9 11.7 - 15.7 g/dL Final     A positive  Rubella immune    A/P:  33 year old  POD# 2 s/p repeat LTCS for labor    1.  Routine post-op cares  2.  Analgesia  3.  Bipolar disorder/anxiety/depression: Continue ZOloft  4.  Tobacco use, +THC on admit - peds following  5.  Discharge later today if BP stable and baby able to go, otherwise POD#3. F/u in 2 weeks for PP mood check.   6.  The plan of care was discussed with the patient.  She expressed understanding and agreement.   7.  Post operative instructions and indications to call or return were discussed.      Liana Henao  2018  8:19 AM   "

## 2018-12-22 NOTE — PLAN OF CARE
Pt up and robina. Voiding without difficulty. Fundus firm and midline. Incision site covered in steri-strips with dried drainage. Tylenol and ibuprofen effective for pain management. No pump needed for discharge. Gas-x given for gas pains, encouraged ambulation. Md okay with early discharge, sticker to be placed for home care. Follow-up with Md in 2 weeks for PP mood check. Continue to monitor.    Alba Trejo

## 2018-12-22 NOTE — PLAN OF CARE
Pt up and robina. Voiding without difficulty. Iv in place - fundus firm and midline. Tylenol, ibuprofen, and oxy effective for pain management. Incision site covered with island dressing, dry drainage marked. Bonding well with baby, responding to infant cues. Breastfeeding with minimal assistance, on demand feedings discussed. Formula being supplemented, blood sugars continue to be stable. Continue to monitor.    Alba Trejo

## 2018-12-23 NOTE — PLAN OF CARE

## 2018-12-23 NOTE — DISCHARGE INSTRUCTIONS
Postop  Birth Instructions    Lactation     [facility] [phone]    Activity       Do not lift more than 10 pounds for 6 weeks after surgery.  Ask family and friends for help when you need it.    No driving until you have stopped taking your pain medications (usually two weeks after surgery).    No heavy exercise or activity for 6 weeks.  Don't do anything that will put a strain on your surgery site.    Don't strain when using the toilet.  Your care team may prescribe a stool softener if you have problems with your bowel movements.     To care for your incision:       Keep the incision clean and dry.    Do not soak your incision in water. No swimming or hot tubs until it has fully healed. You may soak in the bathtub if the water level is below your incision.    Do not use peroxide, gel, cream, lotion, or ointment on your incision.    Adjust your clothes to avoid pressure on your surgery site (check the elastic in your underwear for example).     You may see a small amount of clear or pink drainage and this is normal.  Check with your health care provider:       If the drainage increases or has an odor.    If the incision reddens, you have swelling, or develop a rash.    If you have increased pain and the medicine we prescribed doesn't help.    If you have a fever above 100.4 F (38 C) with or without chills when placing thermometer under your tongue.   The area around your incision (surgery wound), will feel numb.  This is normal. The numbness should go away in less than a year.     Keep your hands clean:  Always wash your hands before touching your incision (surgery wound). This helps reduce your risk of infection. If your hands aren't dirty, you may use an alcohol hand-rub to clean your hands. Keep your nails clean and short.    Call your healthcare provider if you have any of these symptoms:       You soak a sanitary pad with blood within 1 hour, or you see blood clots larger than a golf  ball.    Bleeding that lasts more than 6 weeks.    Vaginal discharge that smells bad.    Severe pain, cramping or tenderness in your lower belly area.    A need to urinate more frequently (use the toilet more often), more urgently (use the toilet very quickly), or it burns when you urinate.    Nausea and vomiting.    Redness, swelling or pain around a vein in your leg.    Problems breastfeeding or a red or painful area on your breast.    Chest pain and cough or are gasping for air.    Problems with coping with sadness, anxiety or depression. If you have concerns about hurting yourself or the baby, call your provider immediately.      You have questions or concerns after you return home.

## 2018-12-23 NOTE — PLAN OF CARE
Discharge education reviewed. No further questions .  AVS given to pt with instructions and signed.  Home meds given with instructions. Will be discharge after 2030 . Discussed Home care due to early discharge. FOB at bedside and eager to learn.

## 2018-12-25 NOTE — OP NOTE
Procedure Date: 2018      PREOPERATIVE DIAGNOSES:   Intrauterine pregnancy 37 weeks 5 days gestation, previous low segment transverse  section with a repeat  section planned on 2018, spontaneous active labor.      POSTPARTUM DIAGNOSES:   Intrauterine pregnancy 37 weeks 5 days gestation, previous low segment transverse  section with a repeat  section planned on 2018, spontaneous active labor.      PROCEDURE:  Repeat low segment transverse  section.      SURGEON:  Jose Tyler MD      ANESTHESIA:  Spinal.      BLOOD LOSS:   mL.      PREOPERATIVE STATUS:  Jonelle Khan is a 33-year-old  woman,  3, para 1-0-1-1, at 37 weeks 5 days gestation who is admitted to [facility] Delivery for evaluation of regular and painful uterine contractions.  The patient received a thorough and comprehensive prenatal care at the offices of [facility].  Her pregnancy was complicated by a previous  section in  for arrest of the active phase of dilation in labor.  She subsequently conceived and received regular prenatal care with the current pregnancy.  The pregnancy was further complicated by the patient's history of depression, anxiety and bipolar disorder.  She was treated with Zoloft and she continues on this medication.  There was found to be a low-lying placenta at 20 weeks' gestation, and this resolved closer to term.  A repeat  section was scheduled for 2018.  However, the patient developed the onset of spontaneous contractions and she was actually evaluated on Labor and Delivery on 2018, was determined to not be in labor and discharged home.  The patient returned on the early evening of 2018 having contractions of moderate intensity every 1-1/2 to 4 minutes.  Her cervical examination was 3 cm, 70%, vertex -2.  This was a change from her previous examination and the patient was felt to be in  labor.  The fetal heart rate tracing was category 1.  The patient's vital signs were normal and the surgery was planned for within 1 hour.  I arrived at the hospital and performed a preoperative history and physical.  The patient's examination was normal.  The estimated fetal weight was 6 to 6-1/2 pounds.  The fetal heart rate tracing was reviewed and was category 1.  Appropriate consent forms were signed.  I did explain the procedure, the pros, cons, risks, benefits and potential complications.  The patient expressed understanding and agreement with the plan of care.  The patient's blood type is A positive, antibody screen was negative.  Rubella titer showed immunity.  HIV, RPR and hepatitis B were all negative.  The patient's group B strep culture was negative at term.  She had a normal 1-hour glucose screen.  Due to her penicillin allergy and due to protocol, the patient was given clindamycin and gentamicin for surgical prophylaxis as well as azithromycin.  The patient was prepared for surgery and a spinal anesthetic was planned.      OPERATIVE FINDINGS:  At the time of  section, the patient was delivered of an infant female in an occiput anterior position.  The weight of the baby was 6 pounds 6 ounces.  The Apgar scores were 5, 5, 7, and 8 at 1, 5, 10 and 15 minutes respectively.  There was thin lightly meconium-stained fluid upon entering the uterine cavity.  The baby was delivered atraumatically and appropriately suctioned.  A brief interval of delayed cord clamping was performed and the baby was immediately brought to the infant warmer.  The  resuscitation team was present for delivery and they provided appropriate cares for the baby.  There was a significant amount of secretions that were suctioned during this period of time.  The baby responded with some episodes of the heart rate decreasing to below 100, but immediately responded to appropriate measures.  The baby was in excellent condition  at the conclusion of the procedure and remained in the operating room with the parents.  The uterus, tubes and ovaries were normal at the time of .  There were no intra-abdominal adhesions and only minimal abdominal wall scarring.  The estimated blood loss for the procedure was 219 mL.        OPERATIVE PROCEDURE:  Jonelle Khan was taken to the operating room at [facility] and placed in the sitting position, a spinal anesthetic was administered by Dr. Tim and the patient was returned to the left lateral tilt position.  The abdomen was prepped and draped in the usual fashion for  section.  Fetal heart rate assessment was normal, category 1 just prior to prepping.  The abdomen was then prepped and draped in the usual fashion for  section.  A Cervantes catheter was placed into the bladder under sterile conditions.  Once the patient was appropriately prepped and draped, the instruments were readied.  A timeout was held, at which time the patient's identity, date of birth, medical record number and the surgery to be performed were reviewed.  All members of the operating room staff were in agreement with the patient's identity and the procedure being a repeat  section.  The patient was allergic to cephalosporins and for this reason she received clindamycin, gentamicin and azithromycin per protocol.  The timeout was concluded and the procedure began.  A pinch test was performed to document the effectiveness of the spinal.  This was affirmative.  A low transverse skin incision was made and carried down through skin, fat and fascia.  The underlying fascia was incised bilaterally.  The rectus muscles were  from the fascia and in the midline.  The peritoneum was elevated and incised.  The peritoneal incision was extended manually.  A bladder blade was placed.  The lower uterine segment was identified.  The bladder flap was then created and dissected away from the lower  uterine segment.  Hysterotomy was then performed and carried down through all layers of myometrium.  The amniotic membranes were ruptured and the fluid was lightly meconium-stained.        The baby was delivered atraumatically, and the baby was bulb suctioned on the abdomen.  Delayed cord clamping was performed for approximately 30-45 seconds, the cord was then clamped and cut and the baby was brought to the infant warmer.  The product of the pregnancy was an infant female, 6 pounds 6 ounces, Apgars 5, 5, 7 8 at 1, 5, 10 and 15 minutes respectively.  The baby was evaluated and managed by the NICU team.  The baby was in excellent condition at the conclusion of the surgery, and remained in the operating room with the parents.  There were no apparent complications.  The placenta was posterior and otherwise appeared normal.  The placenta was discarded.  The uterus was cleansed of all clot and membrane.  The uterus was closed in two layers, the first was a running locked stitch of 0 Vicryl, the second was an imbricating stitch of 0 Monocryl.  Uterine hemostasis was achieved.  Uterine tone was excellent by infusing intravenous oxytocin.  The bladder flap was then closed using 3-0 Vicryl.  The uterus, tubes and ovaries were examined and were confirmed to be normal.  Copious irrigation of the abdominal cavity was then performed and the returns were clear.  All sponge and needle counts were correct.  The anterior peritoneum was closed using a running unlocked stitch of 3-0 Vicryl.  Hemostasis was confirmed in the subfascial tissues.  The fascia was closed using a running unlocked stitch of 0 Vicryl x2, meeting in the midline.  Copious irrigation of the subcutaneous was performed.  The subcutaneous interrupted sutures were then placed of 3-0 Vicryl.  The skin was closed using 4-0 Monocryl in a subcuticular stitch.  Steri-Strips were applied and an island dressing was applied.        The patient tolerated the procedure well.   As stated above, the baby remained in the operating room in excellent condition.  The estimated blood loss was 219 mL after assessment of the amniotic fluid, the irrigation used and the additional blood loss.  The patient tolerated the procedure well.  She will be transferred to the postpartum unit where she will remain for 2-3 days for postoperative observation.         OSCAR HARRISON MD             D: 2018   T: 2018   MT: ASIA      Name:     STARLA STOCK   MRN:      [id number]        Account:        [id number]   :      1985           Procedure Date: 2018      Document: [id number]

## 2018-12-27 ENCOUNTER — VIRTUAL VISIT (OUTPATIENT)
Dept: FAMILY MEDICINE | Facility: CLINIC | Age: 33
End: 2018-12-27
Payer: COMMERCIAL

## 2018-12-27 DIAGNOSIS — F31.81 BIPOLAR 2 DISORDER (H): Primary | ICD-10-CM

## 2018-12-27 DIAGNOSIS — J45.20 MILD INTERMITTENT ASTHMA WITHOUT COMPLICATION: ICD-10-CM

## 2018-12-27 DIAGNOSIS — F41.9 ANXIETY: ICD-10-CM

## 2018-12-27 PROCEDURE — 99441 ZZC PHYSICIAN TELEPHONE EVALUATION 5-10 MIN: CPT | Performed by: NURSE PRACTITIONER

## 2018-12-27 RX ORDER — SERTRALINE HYDROCHLORIDE 100 MG/1
100 TABLET, FILM COATED ORAL DAILY
Qty: 90 TABLET | Refills: 1 | Status: SHIPPED | OUTPATIENT
Start: 2018-12-27 | End: 2019-07-02

## 2018-12-27 ASSESSMENT — ANXIETY QUESTIONNAIRES
6. BECOMING EASILY ANNOYED OR IRRITABLE: NOT AT ALL
IF YOU CHECKED OFF ANY PROBLEMS ON THIS QUESTIONNAIRE, HOW DIFFICULT HAVE THESE PROBLEMS MADE IT FOR YOU TO DO YOUR WORK, TAKE CARE OF THINGS AT HOME, OR GET ALONG WITH OTHER PEOPLE: NOT DIFFICULT AT ALL
3. WORRYING TOO MUCH ABOUT DIFFERENT THINGS: NOT AT ALL
7. FEELING AFRAID AS IF SOMETHING AWFUL MIGHT HAPPEN: SEVERAL DAYS
IF YOU CHECKED OFF ANY PROBLEMS ON THIS QUESTIONNAIRE, HOW DIFFICULT HAVE THESE PROBLEMS MADE IT FOR YOU TO DO YOUR WORK, TAKE CARE OF THINGS AT HOME, OR GET ALONG WITH OTHER PEOPLE: NOT DIFFICULT AT ALL
5. BEING SO RESTLESS THAT IT IS HARD TO SIT STILL: NOT AT ALL
1. FEELING NERVOUS, ANXIOUS, OR ON EDGE: SEVERAL DAYS
2. NOT BEING ABLE TO STOP OR CONTROL WORRYING: NOT AT ALL
GAD7 TOTAL SCORE: 3

## 2018-12-27 ASSESSMENT — PATIENT HEALTH QUESTIONNAIRE - PHQ9
5. POOR APPETITE OR OVEREATING: SEVERAL DAYS
SUM OF ALL RESPONSES TO PHQ QUESTIONS 1-9: 0

## 2018-12-27 NOTE — Clinical Note
Please abstract the following data from this visit with this patient into the appropriate field in Epic:Pap smear done on this date: 4/1/2018 (approximately), by this group: [facility], results were normal.

## 2018-12-27 NOTE — PROGRESS NOTES
"  \"We have found that certain health care needs can be provided without the need for a physical exam.  This service lets us provide the care you need with a short phone conversation.  If a prescription is necessary we can send it directly to your pharmacy.  If lab work is needed we can place an order for that and you can then stop by our lab to have the test done at a later time.    This telephone visit will be conducted via 3 way call with the you (the patient) , the physician/provider, and a me all on the line at the same time.  This allows your physician/provider to have the phone conversation with you while I will be taking notes for your medical record.  We will have full access to your [place] medical record during this entire phone call.    Since this is like an office visit,  will bill your insurance company for this service.  Please check with your medical insurance if this type of telephone/virtual is covered . You may be responsible for the cost of this service if insurance coverage is denied.  The typical cost is $30 (10min), $59(11-20min) and $85 (21-30min)     If during the course of the call the physician/provider feels a telephone visit is not appropriate, you will not be charged for this service\"    Consent has been obtained for this service by care team member: yes.  See the scanned image in the medical record.    S: The history as provided by the patient to the provider during this 3 way call   Jonelle Khan is a 33 year old female who is being evaluated via a telephone visit.      Depression and Anxiety Follow-Up    Status since last visit: No change    Other associated symptoms:None    Complicating factors:     Significant life event: Yes-  Had a baby     Current substance abuse: None  Taking sertraline 100 mg every day as prescribed. PHQ 9 score of 0, LUIS FELIPE 7 score of 3. Recent birth of her daughter, denies any issues with postpartum depression.      Asthma:  Has ACT at home, score of 25, " doing well.    Pap:  Last in 4/2018 at [facility] with NIL    The patient has been notified of following (by MICAELA Quinn     Pertinent parts of the patient's medical history reviewed and confirmed by the provider included :PMH, meds.     Total time of call between patient and provider was 10 minutes     Susan Haase, CNP (MD signature)  ===================================================    I have reviewed the note as documented above.  This accurately captures the substance of my conversation with the patient,    Assessment/Plan:  Jonelle was seen today for medication request.    Diagnoses and all orders for this visit:    Bipolar 2 disorder (H): continues counseling, symptoms well controlled.      Anxiety: PhQ 9 score of 0, LUIS FELIPE 7 score of 3:  Reorder sertraline 100 mg every day.   -     sertraline (ZOLOFT) 100 MG tablet; Take 1 tablet (100 mg) by mouth daily    Mild intermittent asthma without complication: ACT of 25, well controlled.         Follow up in 6 months for clinic visit, sooner as needed.

## 2018-12-29 ASSESSMENT — ASTHMA QUESTIONNAIRES: ACT_TOTALSCORE: 25

## 2018-12-29 ASSESSMENT — ANXIETY QUESTIONNAIRES: GAD7 TOTAL SCORE: 3

## 2019-01-01 NOTE — DISCHARGE SUMMARY
Admit Date:     2018   Discharge Date:     2018      ADMISSION DIAGNOSES:   1.  Intrauterine pregnancy, 37 weeks 5 days gestation.   2.  Previous low segment transverse  section, repeat  section scheduled on 2018, spontaneous active labor.      DISCHARGE DIAGNOSIS:  Status post repeat low segment transverse  section.      PATIENT IDENTIFICATION:  Jonelle Khan is a 33-year-old  woman,  3, para 1, at 37 weeks 5 days gestation, admitted to [facility] Labor and Delivery for evaluation of regular and painful contractions.  The patient received thorough and comprehensive prenatal care at the offices of [facility].  Her pregnancy was complicated by a previous  section in  for arrest of dilation in labor.  She subsequently conceived and received regular prenatal care with the current pregnancy.  Her pregnancy was complicated by the patient's history of depression, anxiety and bipolar disorder.  She was treated with Zoloft and she continues on this medication.  There was found to be a low-lying placenta at 20 weeks gestation and this resolved closer to term.  A repeat  section was scheduled for 2018; however, the patient developed the onset of spontaneous contractions and she and her first evaluation was on 2018.  She was assessed in the maternal assessment area and she was determined to be not in labor and was discharged home.  She then returned on the early evening of 2018, having contractions of moderate intensity every 1-1/2 to 4 minutes.  Her cervix was 3 cm, 70%, vertex -2.  This was a significant change from her previous examination and the patient was felt to be in labor.  The fetal heart rate tracing was category 1.  The patient's vital signs were normal and surgery was planned for within 1 hour.        I arrived at the hospital and performed a preoperative history and physical.  Her examination was normal.   The estimated fetal weight was 6.5 pounds.  The appropriate consent forms were signed.  I did explain the procedure, pros, cons, risks, benefits and potential complications.  Her prenatal labs were reviewed and were normal.  Her group B strep culture had been negative.  Due to her PENICILLIN ALLERGY and due to protocol, she was given clindamycin and gentamicin intravenously as well as azithromycin.      On 2018, the patient underwent a repeat low segment transverse  section.  She was delivered of an infant female weighing 6 pounds 6 ounces, Apgars were 5, 5, 7, and 8 at 1, 5, 10 and 15 minutes respectively.  There was thin lightly meconium-stained fluid upon entering the uterine cavity.  The baby was delivered without incident.  The  resuscitation team was present for delivery and provided appropriate care for the baby.  There were a significant amount of secretions that were suctioned during the immediate post-veda period.  The baby was in satisfactory condition at the conclusion of the procedure.  The uterus, tubes and ovaries were normal at the time of .  There were no intraabdominal adhesions and only minimal abdominal wall scarring.  The blood loss was extremely low at 219 mL.      The patient's postoperative course was unremarkable.  By postoperative day #1, she was doing well.  There were no apparent complications.  Her hemoglobin was 11.9 grams percent.  The patient was restarted on her Zoloft medication.  By the following day, she was ready for discharge and did request discharge on postoperative day #2.  Appropriate instructions to call or return were discussed.  The patient was continued on her Zoloft as previously.  The patient is a known smoker and also had a positive THC screen on admission.  Pediatrics was notified and both  and Pediatrics were to follow.  The patient's blood pressure remained satisfactory.  She was subsequently discharged on the evening  of 2018 afebrile and in satisfactory condition.  She was discharged with her baby.  The patient was instructed to follow up in 2 weeks for postpartum mental health status.  She will also be seen at 6 weeks for her postpartum and postoperative exam.  Appropriate instructions were discussed and indications to call or return were reviewed.  The patient expressed understanding and agreement with the plan of care.  She requested discharge on postoperative day #2 and, since she remained on pathway, discharge was accomplished later that evening.         JOSE TYLER MD             D: 2019   T: 2019   MT: PK      Name:     STARLA STOCK   MRN:      [id number]        Account:        [id number]   :      1985           Admit Date:     2018                                  Discharge Date: 2018      Document: [id number]       cc: Jose Tyler MD

## 2019-02-11 ENCOUNTER — OFFICE VISIT (OUTPATIENT)
Dept: FAMILY MEDICINE | Facility: CLINIC | Age: 34
End: 2019-02-11
Payer: COMMERCIAL

## 2019-02-11 VITALS
OXYGEN SATURATION: 98 % | HEIGHT: 63 IN | DIASTOLIC BLOOD PRESSURE: 70 MMHG | TEMPERATURE: 98.3 F | WEIGHT: 157 LBS | HEART RATE: 74 BPM | BODY MASS INDEX: 27.82 KG/M2 | RESPIRATION RATE: 14 BRPM | SYSTOLIC BLOOD PRESSURE: 118 MMHG

## 2019-02-11 DIAGNOSIS — J45.20 MILD INTERMITTENT ASTHMA WITHOUT COMPLICATION: Primary | ICD-10-CM

## 2019-02-11 DIAGNOSIS — F31.81 BIPOLAR 2 DISORDER (H): ICD-10-CM

## 2019-02-11 DIAGNOSIS — F41.9 ANXIETY: ICD-10-CM

## 2019-02-11 DIAGNOSIS — G43.009 MIGRAINE WITHOUT AURA AND WITHOUT STATUS MIGRAINOSUS, NOT INTRACTABLE: ICD-10-CM

## 2019-02-11 PROBLEM — Z98.890 POST-OPERATIVE STATE: Status: RESOLVED | Noted: 2018-12-20 | Resolved: 2019-02-11

## 2019-02-11 PROCEDURE — 99214 OFFICE O/P EST MOD 30 MIN: CPT | Performed by: NURSE PRACTITIONER

## 2019-02-11 RX ORDER — ALBUTEROL SULFATE 90 UG/1
2 AEROSOL, METERED RESPIRATORY (INHALATION) EVERY 6 HOURS PRN
Qty: 1 INHALER | Refills: 11 | Status: SHIPPED | OUTPATIENT
Start: 2019-02-11 | End: 2019-05-08

## 2019-02-11 ASSESSMENT — ANXIETY QUESTIONNAIRES
GAD7 TOTAL SCORE: 2
GAD7 TOTAL SCORE: 2
7. FEELING AFRAID AS IF SOMETHING AWFUL MIGHT HAPPEN: NOT AT ALL
1. FEELING NERVOUS, ANXIOUS, OR ON EDGE: SEVERAL DAYS
2. NOT BEING ABLE TO STOP OR CONTROL WORRYING: NOT AT ALL
6. BECOMING EASILY ANNOYED OR IRRITABLE: NOT AT ALL
4. TROUBLE RELAXING: NOT AT ALL
3. WORRYING TOO MUCH ABOUT DIFFERENT THINGS: NOT AT ALL
7. FEELING AFRAID AS IF SOMETHING AWFUL MIGHT HAPPEN: NOT AT ALL
5. BEING SO RESTLESS THAT IT IS HARD TO SIT STILL: SEVERAL DAYS
GAD7 TOTAL SCORE: 2

## 2019-02-11 ASSESSMENT — PATIENT HEALTH QUESTIONNAIRE - PHQ9
10. IF YOU CHECKED OFF ANY PROBLEMS, HOW DIFFICULT HAVE THESE PROBLEMS MADE IT FOR YOU TO DO YOUR WORK, TAKE CARE OF THINGS AT HOME, OR GET ALONG WITH OTHER PEOPLE: NOT DIFFICULT AT ALL
SUM OF ALL RESPONSES TO PHQ QUESTIONS 1-9: 0
SUM OF ALL RESPONSES TO PHQ QUESTIONS 1-9: 0

## 2019-02-11 ASSESSMENT — MIFFLIN-ST. JEOR: SCORE: 1391.28

## 2019-02-11 NOTE — PROGRESS NOTES
SUBJECTIVE:   Jonelle Khan is a 33 year old female who presents to clinic today for the following health issues:    History of Present Illness     Asthma:     Cough::  No    Wheezing::  No    Dyspnea::  No    Use of rescue inhaler::  Twice a month    Taking Asthma medication as prescribed::  NO    Asthma triggers::  Cold air, Emotions and Exercise or sports    ER/UC Visits or Admissions::  None    Depression & Anxiety Follow-up:     Depression/Anxiety:  Anxiety only    Status since last visit::  Stable    Other associated symptoms of anxiety::  None    Significant life event::  No    Current substance use::  None    Depression symptoms::  None       Today's PHQ-9         PHQ-9 Total Score:     (P) 0   PHQ-9 Q9 Suicidal ideation:   (P) Not at all   Thoughts of suicide or self harm:      Self-harm Plan:        Self-harm Action:          Safety concerns for self or others:       LUIS FELIPE-7 Total Score: (P) 2    Migraines:     Headache Symptoms are:  Improving    Migraine frequency::  5 per year    Migraine Duration::  2 days    Ability to perform ADL's::  No    Migraine Rescue/Relief Medications::  None and Ibuprofen (Advil, Motrin)    Effectiveness of rescue/relief medications::  Minor relief    Migraine Preventative Medications::  None    Neurological symptoms::  None    ER or UC Visits::  None    Diet:  Regular (no restrictions)  Frequency of exercise:  1 day/week  Duration of exercise:  15-30 minutes  Taking medications regularly:  Yes  Medication side effects:  None  Additional concerns today:  No    Asthma: Well controlled. ACT score of 23    Depression: Well controlled on Zoloft 100 mg daily. Not currently in counseling.     Migraines: Controlled. Headache every day for first half of pregnancy, otherwise last migraine 03/2018.     Elevated blood pressure: Checking BP at home, systolic consistently elevated. Occasional dizziness. Denies LE edema, chest pain, heart palpitations, headaches. She reports elevated  BP during last month of pregnancy. Today at the clinic BP is 120/70. Recheck 118/70. Will contact with tracked numbers on [employer].     Menses: Restarted OCP (Mini pill) after last pregnancy. Denies vaginal bleeding. Will continue for one year until next pregnancy.     Problem list and histories reviewed & adjusted, as indicated.  Additional history: as documented    Patient Active Problem List   Diagnosis     Allergic rhinitis     Health Care Home     Bipolar 2 disorder (H)     Migraine without aura and without status migrainosus, not intractable     Mild intermittent asthma without complication     Intractable nausea and vomiting     Anxiety     ONELIA (obstructive sleep apnea)     Vitamin D deficiency     Indication for care in labor or delivery     Post-operative state     S/P  section     Past Surgical History:   Procedure Laterality Date     C NONSPECIFIC PROCEDURE  age 10     chalazion removal under general       SECTION  12/10/2013    Procedure:  SECTION;   Section for failure to progress. ;  Surgeon: Monique Cna MD;  Location: [facility] L+D      SECTION N/A 2018    Procedure: REPEAT  SECTION;  Surgeon: Jose Tyler MD;  Location: [facility] OR     LAPAROSCOPIC CHOLECYSTECTOMY N/A 2015    Procedure: LAPAROSCOPIC CHOLECYSTECTOMY;  Surgeon: Yuliet Hutton MD;  Location: [facility] OR       Social History     Tobacco Use     Smoking status: Former Smoker     Types: Cigarettes     Smokeless tobacco: Never Used     Tobacco comment: occasional   Substance Use Topics     Alcohol use: Yes     Alcohol/week: 0.0 oz     Comment: 1-2 a month     Family History   Problem Relation Age of Onset     Family History Negative Mother      Gallbladder Disease Mother      Cancer Maternal Grandfather         skin CA      Gallbladder Disease Maternal Grandfather      Asthma Father      Gastrointestinal Disease Father         s/p maninder. Also Paunt and Mgf s/p maninder.      Asthma  Brother      Asthma Sister      Depression Sister      Anxiety Disorder Sister      Heart Disease Paternal Grandfather         MI in his 60's      Breast Cancer Maternal Grandmother          M great grandmother  in her 60's      Depression Maternal Grandmother      Coronary Artery Disease Maternal Grandmother      Diabetes Paternal Aunt         Dx in her mid to late 30's (sounds like DM2)     Depression Paternal Aunt      Anxiety Disorder Paternal Aunt      Substance Abuse Paternal Aunt      C.A.D. Paternal Grandmother         Stent placed age 63      Depression Paternal Grandmother      Depression Paternal Uncle      Substance Abuse Paternal Uncle      Anxiety Disorder Cousin          Current Outpatient Medications   Medication Sig Dispense Refill     albuterol (2.5 MG/3ML) 0.083% neb solution Take 1 vial (2.5 mg) by nebulization every 6 hours as needed for shortness of breath / dyspnea or wheezing 25 vial 11     albuterol (PROAIR HFA/PROVENTIL HFA/VENTOLIN HFA) 108 (90 BASE) MCG/ACT Inhaler Inhale 2 puffs into the lungs every 6 hours as needed for shortness of breath / dyspnea or wheezing 1 Inhaler 11     sertraline (ZOLOFT) 100 MG tablet Take 1 tablet (100 mg) by mouth daily 90 tablet 1     ipratropium - albuterol 0.5 mg/2.5 mg/3 mL (DUONEB) 0.5-2.5 (3) MG/3ML neb solution Take 1 vial (3 mLs) by nebulization once for 1 dose 3 mL 0     Allergies   Allergen Reactions     Ceclor [Cefaclor] Hives       ROS:  Constitutional,  cardiovascular, pulmonary, , neuro, skin, endocrine and psych systems are negative, except as otherwise noted.    This document serves as a record of the services and decisions personally performed and made by Susan Haase, CNP. It was created on her behalf by Leilani Santos, a trained medical scribe. The creation of this document is based on the provider's statements to the medical scribe.  Leilani Santos 3:11 PM 2019  OBJECTIVE:   /70 (BP Location: Right arm, Patient Position:  "Chair, Cuff Size: Adult Regular)   Pulse 74   Temp 98.3  F (36.8  C) (Oral)   Resp 14   Ht 1.6 m (5' 3\")   Wt 71.2 kg (157 lb)   SpO2 98%   BMI 27.81 kg/m    Body mass index is 27.81 kg/m .  GENERAL: healthy, alert and no distress  RESP: lungs clear to auscultation - no rales, rhonchi or wheezes  CV: regular rate and rhythm, normal S1 S2, no S3 or S4, no murmur, click or rub, no peripheral edema   PSYCH: mentation appears normal, affect normal/bright    ASSESSMENT/PLAN:   Jonelle was seen today for recheck medication.    Diagnoses and all orders for this visit:    Mild intermittent asthma without complication: ACT score of 23. Well controlled, stable. Refill below.   -     albuterol (PROAIR HFA/PROVENTIL HFA/VENTOLIN HFA) 108 (90 Base) MCG/ACT inhaler; Inhale 2 puffs into the lungs every 6 hours as needed for shortness of breath / dyspnea or wheezing  Bipolar Disorder/Anxiety:  Well controlled, continue sertraline 100mg every day as prescribed.  PHQ 9 of 0, LUIS FELIPE 7 score of 2.   Migraine: no concerns.    Follow up visit in 6 months for medication check, sooner as needed.   The information in this document, created by the medical scribe for me, accurately reflects the services I personally performed and the decisions made by me. I have reviewed and approved this document for accuracy prior to leaving the patient care area.  February 11, 2019 3:16 PM  Susan Haase, APRN [facility]"

## 2019-02-12 ASSESSMENT — PATIENT HEALTH QUESTIONNAIRE - PHQ9: SUM OF ALL RESPONSES TO PHQ QUESTIONS 1-9: 0

## 2019-02-12 ASSESSMENT — ASTHMA QUESTIONNAIRES: ACT_TOTALSCORE: 23

## 2019-02-12 ASSESSMENT — ANXIETY QUESTIONNAIRES: GAD7 TOTAL SCORE: 2
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
## 2019-05-08 ENCOUNTER — OFFICE VISIT (OUTPATIENT)
Dept: FAMILY MEDICINE | Facility: CLINIC | Age: 34
End: 2019-05-08

## 2019-05-08 ENCOUNTER — TELEPHONE (OUTPATIENT)
Dept: FAMILY MEDICINE | Facility: CLINIC | Age: 34
End: 2019-05-08

## 2019-05-08 VITALS
BODY MASS INDEX: 27.88 KG/M2 | TEMPERATURE: 98.4 F | WEIGHT: 157.4 LBS | SYSTOLIC BLOOD PRESSURE: 126 MMHG | HEART RATE: 88 BPM | RESPIRATION RATE: 20 BRPM | DIASTOLIC BLOOD PRESSURE: 82 MMHG

## 2019-05-08 DIAGNOSIS — F41.9 ANXIETY: ICD-10-CM

## 2019-05-08 DIAGNOSIS — F31.81 BIPOLAR 2 DISORDER (H): Primary | ICD-10-CM

## 2019-05-08 PROCEDURE — 99203 OFFICE O/P NEW LOW 30 MIN: CPT | Performed by: PHYSICIAN ASSISTANT

## 2019-05-08 RX ORDER — SERTRALINE HYDROCHLORIDE 100 MG/1
100 TABLET, FILM COATED ORAL DAILY
Qty: 90 TABLET | Refills: 1 | Status: CANCELLED | OUTPATIENT
Start: 2019-05-08

## 2019-05-08 RX ORDER — FAMOTIDINE 20 MG
1 TABLET ORAL DAILY
COMMUNITY
Start: 2019-05-08

## 2019-05-08 ASSESSMENT — ANXIETY QUESTIONNAIRES
7. FEELING AFRAID AS IF SOMETHING AWFUL MIGHT HAPPEN: SEVERAL DAYS
GAD7 TOTAL SCORE: 12
1. FEELING NERVOUS, ANXIOUS, OR ON EDGE: NEARLY EVERY DAY
6. BECOMING EASILY ANNOYED OR IRRITABLE: MORE THAN HALF THE DAYS
2. NOT BEING ABLE TO STOP OR CONTROL WORRYING: SEVERAL DAYS
5. BEING SO RESTLESS THAT IT IS HARD TO SIT STILL: MORE THAN HALF THE DAYS
3. WORRYING TOO MUCH ABOUT DIFFERENT THINGS: SEVERAL DAYS
IF YOU CHECKED OFF ANY PROBLEMS ON THIS QUESTIONNAIRE, HOW DIFFICULT HAVE THESE PROBLEMS MADE IT FOR YOU TO DO YOUR WORK, TAKE CARE OF THINGS AT HOME, OR GET ALONG WITH OTHER PEOPLE: SOMEWHAT DIFFICULT

## 2019-05-08 ASSESSMENT — PATIENT HEALTH QUESTIONNAIRE - PHQ9
SUM OF ALL RESPONSES TO PHQ QUESTIONS 1-9: 5
5. POOR APPETITE OR OVEREATING: MORE THAN HALF THE DAYS

## 2019-05-08 NOTE — NURSING NOTE
Jonelle Khan is here for a consult for anxiety.    Questioned patient about current smoking habits.  Pt. quit smoking some time ago.  PULSE regular  My Chart: declines  CLASSIFICATION OF OVERWEIGHT AND OBESITY BY BMI                        Obesity Class           BMI(kg/m2)  Underweight                                    < 18.5  Normal                                         18.5-24.9  Overweight                                     25.0-29.9  OBESITY                     I                  30.0-34.9                             II                 35.0-39.9  EXTREME OBESITY             III                >40                            Patient's  BMI Body mass index is 27.88 kg/m .  http://hin.nhlbi.nih.gov/menuplanner/menu.cgi  Pre-visit planning  Immunizations - up to date  Colonoscopy -   Mammogram -   Asthma -   PHQ9 -  Completed today  LUIS FELIPE-7 -  Completed today    The patient has verbalized that it is ok to leave a detailed voice message on the patient's cell phone with results/recommendations from this visit.

## 2019-05-08 NOTE — TELEPHONE ENCOUNTER
Jonelle called to say that she would like a letter for [employer].    Call with any questions [phone]

## 2019-05-09 ASSESSMENT — ANXIETY QUESTIONNAIRES: GAD7 TOTAL SCORE: 12

## 2019-05-10 ENCOUNTER — PATIENT OUTREACH (OUTPATIENT)
Dept: CARE COORDINATION | Facility: CLINIC | Age: 34
End: 2019-05-10

## 2019-05-10 NOTE — PROGRESS NOTES
Clinic Care Coordination Contact  [facility]/Voicemail       Clinical Data: Care Coordinator Outreach  PCP referral to clinic care coordination to discuss financial and insurance concerns    Outreach attempted x 1.  Left message on voicemail with call back information and requested return call.  Plan:   Care Coordinator will try to reach patient again in 1-2 business days.  Geri Caban RN  Clinic Care Coordinator  Office [phone]  [email or web address]

## 2019-06-21 NOTE — PROGRESS NOTES
Clinic Care Coordination Contact  [facility]/Voicemail       Clinical Data: Care Coordinator Outreach  Outreach attempted x 3 Patient and CCC exchanged voice mail for follow up .  Left message on voicemail with call back information and requested return call.    No further outreaches will be made at this time unless a new referral is made or a change in the pt's status occurs. Patient was provided with this writer's contact information and encouraged to call with any questions or concerns.

## 2019-07-02 DIAGNOSIS — F41.9 ANXIETY: ICD-10-CM

## 2019-07-02 NOTE — TELEPHONE ENCOUNTER
"Last Written Prescription Date:  12/27/18  Last Fill Quantity: 90 tablet,  # refills: 1   Last office visit: 2/11/2019 with prescribing provider:  Haase   Future Office Visit:      Requested Prescriptions   Pending Prescriptions Disp Refills     sertraline (ZOLOFT) 100 MG tablet [Pharmacy Med Name: SERTRALINE HCL 100MG TABS] 90 tablet 1     Sig: TAKE ONE TABLET BY MOUTH ONCE DAILY       SSRIs Protocol Passed - 7/2/2019 11:01 AM        Passed - Recent (12 mo) or future (30 days) visit within the authorizing provider's specialty     Patient had office visit in the last 12 months or has a visit in the next 30 days with authorizing provider or within the authorizing provider's specialty.  See \"Patient Info\" tab in inbasket, or \"Choose Columns\" in Meds & Orders section of the refill encounter.              Passed - Medication is active on med list        Passed - Patient is age 18 or older        Passed - No active pregnancy on record        Passed - No positive pregnancy test in last 12 months        [facility] Follow-up to PHQ 12/27/2018 2/11/2019 5/8/2019   PHQ-9 9. Suicide Ideation past 2 weeks Not at all Not at all Not at all     LUIS FELIPE-7 SCORE 12/27/2018 2/11/2019 5/8/2019   Total Score - - -   Total Score - 2 (minimal anxiety) -   Total Score 3 2 12         "

## 2019-07-05 RX ORDER — SERTRALINE HYDROCHLORIDE 100 MG/1
TABLET, FILM COATED ORAL
Qty: 90 TABLET | Refills: 1 | Status: SHIPPED | OUTPATIENT
Start: 2019-07-05 | End: 2020-01-23 | Stop reason: DRUGHIGH

## 2019-07-05 NOTE — TELEPHONE ENCOUNTER
Patient still see's Susan Haase as PCP please have her address Rx, Patient will be out this weekend.

## 2019-07-08 NOTE — NURSING NOTE
"Chief Complaint   Patient presents with     URI     Abnormal Bleeding Problem       Initial /60 (BP Location: Left arm, Patient Position: Chair, Cuff Size: Adult Regular)  Pulse 88  Temp 98.7  F (37.1  C) (Oral)  Resp 14  Ht 5' 2.5\" (1.588 m)  Wt 143 lb (64.9 kg)  SpO2 99%  BMI 25.74 kg/m2 Estimated body mass index is 25.74 kg/(m^2) as calculated from the following:    Height as of this encounter: 5' 2.5\" (1.588 m).    Weight as of this encounter: 143 lb (64.9 kg).  Medication Reconciliation: complete   Jonelle Quinn CMA      " Left voice message giving her the date and time of her hospital follow up

## 2019-07-10 NOTE — TELEPHONE ENCOUNTER
Spoke with pt. She is unsure why refill went to [facility]. Understands it should be coming from her primary Susan Haase.    Thanks, Demi

## 2019-08-19 NOTE — TELEPHONE ENCOUNTER
Can you please call Jonelle and ask her to set up a phone visit with me in the next month, I last saw her for a visit in 2/2019 and would just like to update her depression.  Thanks,  Susan Haase, CNP

## 2020-01-13 LAB
HBV SURFACE AG SERPL QL IA: NON REACTIVE
HIV 1+2 AB+HIV1 P24 AG SERPL QL IA: NORMAL
RUBELLA ABY IGG: NORMAL
TREPONEMA ANTIBODIES: NORMAL

## 2020-01-23 ENCOUNTER — VIRTUAL VISIT (OUTPATIENT)
Dept: FAMILY MEDICINE | Facility: CLINIC | Age: 35
End: 2020-01-23

## 2020-01-23 DIAGNOSIS — F41.9 ANXIETY: ICD-10-CM

## 2020-01-23 PROCEDURE — 99441 ZZC PHYSICIAN TELEPHONE EVALUATION 5-10 MIN: CPT | Performed by: NURSE PRACTITIONER

## 2020-01-23 RX ORDER — SERTRALINE HYDROCHLORIDE 100 MG/1
150 TABLET, FILM COATED ORAL DAILY
Qty: 135 TABLET | Refills: 1 | Status: SHIPPED | OUTPATIENT
Start: 2020-01-23 | End: 2020-08-11

## 2020-01-23 ASSESSMENT — ANXIETY QUESTIONNAIRES
5. BEING SO RESTLESS THAT IT IS HARD TO SIT STILL: SEVERAL DAYS
7. FEELING AFRAID AS IF SOMETHING AWFUL MIGHT HAPPEN: NOT AT ALL
6. BECOMING EASILY ANNOYED OR IRRITABLE: SEVERAL DAYS
2. NOT BEING ABLE TO STOP OR CONTROL WORRYING: SEVERAL DAYS
GAD7 TOTAL SCORE: 6
1. FEELING NERVOUS, ANXIOUS, OR ON EDGE: SEVERAL DAYS
3. WORRYING TOO MUCH ABOUT DIFFERENT THINGS: SEVERAL DAYS
IF YOU CHECKED OFF ANY PROBLEMS ON THIS QUESTIONNAIRE, HOW DIFFICULT HAVE THESE PROBLEMS MADE IT FOR YOU TO DO YOUR WORK, TAKE CARE OF THINGS AT HOME, OR GET ALONG WITH OTHER PEOPLE: SOMEWHAT DIFFICULT

## 2020-01-23 ASSESSMENT — PATIENT HEALTH QUESTIONNAIRE - PHQ9
5. POOR APPETITE OR OVEREATING: SEVERAL DAYS
SUM OF ALL RESPONSES TO PHQ QUESTIONS 1-9: 4

## 2020-01-23 NOTE — PROGRESS NOTES
"Jonelle Khan is a 34 year old female who is being evaluated via a billable telephone visit.      The patient has been notified of following:     \"This telephone visit will be conducted via a call between you and your physician/provider. We have found that certain health care needs can be provided without the need for a physical exam.  This service lets us provide the care you need with a short phone conversation.  If a prescription is necessary we can send it directly to your pharmacy.  If lab work is needed we can place an order for that and you can then stop by our lab to have the test done at a later time.    If during the course of the call the physician/provider feels a telephone visit is not appropriate, you will not be charged for this service.\"     Consent has been obtained for this service by 1 care team member: yes. See the scanned image in the medical record.    Jonelle Khan complains of    Chief Complaint   Patient presents with     Recheck Medication     Zoloft       I have reviewed and updated the patient's Past Medical History, Social History, Family History and Medication List.    ALLERGIES  Ceclor [cefaclor]    Pepe Piper MA   (MA signature)  Depression and Anxiety Follow-Up    How are you doing with your depression since your last visit? No change-but dealing with a lot of different things at the moment.    How are you doing with your anxiety since your last visit?  \"Up and Down days\"    Are you having other symptoms that might be associated with depression or anxiety? No    Have you had a significant life event? OTHER: took in 3 foster kids, 2 of them have special needs, this is difficult to manage with every day life.     Do you have any concerns with your use of alcohol or other drugs? No  3 foster kids age  (6,7,8) .  Has her own children ages 6 and 1.    Currently 10 weeks pregnant.    Continues to take sertraline for depression and anxiety.  PHQ 9 score is 4.  LUIS FELIPE 7 score " of 6.  Increase in irritability with stress as well as withdraw from others.   Social History     Tobacco Use     Smoking status: Former Smoker     Types: Cigarettes     Smokeless tobacco: Never Used     Tobacco comment: occasional   Substance Use Topics     Alcohol use: Not Currently     Alcohol/week: 0.0 standard drinks     Comment: 1-2 a month     Drug use: No     PHQ 12/27/2018 2/11/2019 5/8/2019   PHQ-9 Total Score 0 0 5   Q9: Thoughts of better off dead/self-harm past 2 weeks Not at all Not at all Not at all     LUIS FELIPE-7 SCORE 12/27/2018 2/11/2019 5/8/2019   Total Score - - -   Total Score - 2 (minimal anxiety) -   Total Score 3 2 12     Last PHQ-9 1/23/2020   1.  Little interest or pleasure in doing things 0   2.  Feeling down, depressed, or hopeless 0   3.  Trouble falling or staying asleep, or sleeping too much 1   4.  Feeling tired or having little energy 2   5.  Poor appetite or overeating 1   6.  Feeling bad about yourself 0   7.  Trouble concentrating 0   8.  Moving slowly or restless 0   Q9: Thoughts of better off dead/self-harm past 2 weeks 0   PHQ-9 Total Score 4   Difficulty at work, home, or with people Somewhat difficult     LUIS FELIPE-7  1/23/2020   1. Feeling nervous, anxious, or on edge 1   2. Not being able to stop or control worrying 1   3. Worrying too much about different things 1   4. Trouble relaxing 1   5. Being so restless that it is hard to sit still 1   6. Becoming easily annoyed or irritable 1   7. Feeling afraid, as if something awful might happen 0   LUIS FELIPE-7 Total Score 6   If you checked any problems, how difficult have they made it for you to do your work, take care of things at home, or get along with other people? Somewhat difficult   Suicide Assessment Five-step Evaluation and Treatment (SAFE-T)  Going to [facility] for pregnancy.      Assessment/Plan:  Jonelle was seen today for recheck medication.    Diagnoses and all orders for this visit:    Anxiety:  PHQ 9 score of 4, LUIS FELIPE 7 score of  6.  Will increase sertraline to 150 mg.  Discussed counseling.  Currently 10 weeks pregnant, followed by [facility]  -     sertraline (ZOLOFT) 100 MG tablet; Take 1.5 tablets (150 mg) by mouth daily    I have reviewed the note as documented above.  This accurately captures the substance of my conversation with the patient.  Jonelle Khan    Total time of call between patient and provider was 8 minutes   Susan Haase, CNP

## 2020-01-24 ASSESSMENT — ANXIETY QUESTIONNAIRES: GAD7 TOTAL SCORE: 6

## 2020-05-26 DIAGNOSIS — Z11.59 ENCOUNTER FOR SCREENING FOR OTHER VIRAL DISEASES: Primary | ICD-10-CM

## 2020-05-26 RX ORDER — SODIUM CHLORIDE, SODIUM LACTATE, POTASSIUM CHLORIDE, CALCIUM CHLORIDE 600; 310; 30; 20 MG/100ML; MG/100ML; MG/100ML; MG/100ML
INJECTION, SOLUTION INTRAVENOUS CONTINUOUS
Status: CANCELLED | OUTPATIENT
Start: 2020-05-26

## 2020-05-26 RX ORDER — CITRIC ACID/SODIUM CITRATE 334-500MG
30 SOLUTION, ORAL ORAL
Status: CANCELLED | OUTPATIENT
Start: 2020-05-26

## 2020-05-26 RX ORDER — CLINDAMYCIN PHOSPHATE 900 MG/50ML
900 INJECTION, SOLUTION INTRAVENOUS
Status: CANCELLED | OUTPATIENT
Start: 2020-05-26

## 2020-07-01 ENCOUNTER — TRANSFERRED RECORDS (OUTPATIENT)
Dept: HEALTH INFORMATION MANAGEMENT | Facility: CLINIC | Age: 35
End: 2020-07-01

## 2020-07-01 LAB — PAP SMEAR - HIM PATIENT REPORTED: NEGATIVE

## 2020-07-28 LAB — GROUP B STREP PCR: NEGATIVE

## 2020-07-29 ENCOUNTER — HOSPITAL ENCOUNTER (OUTPATIENT)
Facility: CLINIC | Age: 35
Discharge: HOME | End: 2020-07-30
Attending: OBSTETRICS & GYNECOLOGY | Admitting: OBSTETRICS & GYNECOLOGY
Payer: COMMERCIAL

## 2020-07-29 PROCEDURE — G0463 HOSPITAL OUTPT CLINIC VISIT: HCPCS

## 2020-07-29 PROCEDURE — 12000000 ZZH R&B MED SURG/OB

## 2020-07-30 VITALS
DIASTOLIC BLOOD PRESSURE: 75 MMHG | BODY MASS INDEX: 28.88 KG/M2 | HEIGHT: 63 IN | WEIGHT: 163 LBS | RESPIRATION RATE: 16 BRPM | SYSTOLIC BLOOD PRESSURE: 110 MMHG | TEMPERATURE: 98.6 F

## 2020-07-30 PROBLEM — Z36.89 ENCOUNTER FOR TRIAGE IN PREGNANT PATIENT: Status: ACTIVE | Noted: 2020-07-30

## 2020-07-30 LAB
ALBUMIN UR-MCNC: NEGATIVE MG/DL
APPEARANCE UR: ABNORMAL
BACTERIA #/AREA URNS HPF: ABNORMAL /HPF
BILIRUB UR QL STRIP: NEGATIVE
COLOR UR AUTO: YELLOW
GLUCOSE UR STRIP-MCNC: NEGATIVE MG/DL
HGB UR QL STRIP: NEGATIVE
KETONES UR STRIP-MCNC: NEGATIVE MG/DL
LEUKOCYTE ESTERASE UR QL STRIP: NEGATIVE
MUCOUS THREADS #/AREA URNS LPF: PRESENT /LPF
NITRATE UR QL: NEGATIVE
PH UR STRIP: 5.5 PH (ref 5–7)
RBC #/AREA URNS AUTO: 1 /HPF (ref 0–2)
SOURCE: ABNORMAL
SP GR UR STRIP: 1.02 (ref 1–1.03)
SQUAMOUS #/AREA URNS AUTO: 2 /HPF (ref 0–1)
UROBILINOGEN UR STRIP-MCNC: NORMAL MG/DL (ref 0–2)
WBC #/AREA URNS AUTO: 2 /HPF (ref 0–5)

## 2020-07-30 PROCEDURE — 81001 URINALYSIS AUTO W/SCOPE: CPT | Performed by: OBSTETRICS & GYNECOLOGY

## 2020-07-30 PROCEDURE — G0463 HOSPITAL OUTPT CLINIC VISIT: HCPCS

## 2020-07-30 PROCEDURE — 25000132 ZZH RX MED GY IP 250 OP 250 PS 637: Performed by: OBSTETRICS & GYNECOLOGY

## 2020-07-30 RX ORDER — HYDROXYZINE HYDROCHLORIDE 50 MG/1
50-100 TABLET, FILM COATED ORAL ONCE
Status: COMPLETED | OUTPATIENT
Start: 2020-07-30 | End: 2020-07-30

## 2020-07-30 RX ADMIN — HYDROXYZINE HYDROCHLORIDE 100 MG: 50 TABLET, FILM COATED ORAL at 02:22

## 2020-07-30 ASSESSMENT — MIFFLIN-ST. JEOR: SCORE: 1413.49

## 2020-07-30 NOTE — PLAN OF CARE
Data: Patient presented to [facility]: 2020 11:49 PM.  Reason for maternal/fetal assessment is uterine contractions. Patient reports contractions that started around 5:30 this evening that have increased in intensity and every 5-6 minutes apart.  Pt describes them as tightening at the top of her belly and  in her hips.  Patient is a .  Prenatal record reviewed. Pregnancy  has been uncomplicated.  Gestational Age 36w6d. VSS. Fetal movement present. Patient denies leaking of vaginal fluid/rupture of membranes, vaginal bleeding, nausea, vomiting, visual disturbances, significant edema. Support person is present.   Action: Verbal consent for EFM. Triage assessment completed. Bill of rights reviewed.  Response: Patient verbalized agreement with plan. Will contact Dr Nataly Baker with update and further orders.    Krista Patterson RN on 2020 at 12:30 AM

## 2020-07-30 NOTE — DISCHARGE INSTRUCTIONS
Discharge Instruction for Undelivered Patients      You were seen for: Labor Assessment  We Consulted: Dr. Baker  You had (Test or Medicine):Urinalysis     Diet:   Drink 8 to 12 glasses of liquids (milk, juice, water) every day.  You may eat meals and snacks.     Activity:  Count fetal kicks everyday (see handout)  Call your doctor or nurse midwife if your baby is moving less than usual.     Call your provider if you notice:  Swelling in your face or increased swelling in your hands or legs.  Headaches that are not relieved by Tylenol (acetaminophen).  Changes in your vision (blurring: seeing spots or stars.)  Nausea (sick to your stomach) and vomiting (throwing up).   Weight gain of 5 pounds or more per week.  Heartburn that doesn't go away.  Signs of bladder infection: pain when you urinate (use the toilet), need to go more often and more urgently.  The bag of stanton (rupture of membranes) breaks, or you notice leaking in your underwear.  Bright red blood in your underwear.  Abdominal (lower belly) or stomach pain.  Second (plus) baby: Contractions (tightening) less than 10 minutes apart and getting stronger.  Increase or change in vaginal discharge (note the color and amount)      Follow-up:  As scheduled in the clinic

## 2020-07-30 NOTE — PLAN OF CARE
Pt decided she will take the vistaril and go home to rest.     Krista Patterson RN on 7/30/2020 at 2:16 AM

## 2020-07-30 NOTE — PROVIDER NOTIFICATION
20 0022   Provider Notification   Provider Name/Title Dr. Baker   Method of Notification Electronic Page   Request Evaluate - Remote   Notification Reason Patient Arrived;SVE       Updated MD regarding pt arrival. Pt is experiencing contractions every 5-6 minutes apart per report. Pt is marking contractions on the tracing every 2-3 minutes. Some palpate mild, otherwise unable to palpate.  Cat 1 tracing. Pt is scheduled for a repeat  section with tubal on .  After speaking to MD, plan to recheck SVE in one hour and give her some water to hydrate herself. Plan communicated with patient.     Krista Patterson RN on 2020 at 12:33 AM

## 2020-07-30 NOTE — PLAN OF CARE
Data: Patient assessed in the [facility] for uterine contractions.  Cervical exam dilated to 2.5/40/-2.  Membranes intact.  Contractions 2-5 minutes.  Action:  Presumed adequate fetal oxygenation documented (see flow record). Discharge instructions reviewed.  Patient instructed to report change in fetal movement, vaginal leaking of fluid or bleeding, abdominal pain, or any concerns related to the pregnancy to her nurse/physician.    Response: Orders to discharge home per Nataly Baker.  Patient verbalized understanding of education and verbalized agreement with plan. Discharged to home at 0230.

## 2020-07-30 NOTE — PROVIDER NOTIFICATION
07/30/20 0153   Provider Notification   Provider Name/Title Dr. Baker   Method of Notification Electronic Page   Request Evaluate - Remote   Notification Reason SVE       MD updated on pt SVE that was unchanged. Pt is still feeling contractions but intensity is the same.  MD stated that pt can take vistaril and go home to rest, or pt can stay and have her SVE checked in another hour.     Krista Patterson RN on 7/30/2020 at 2:15 AM

## 2020-08-07 ENCOUNTER — HOSPITAL ENCOUNTER (OUTPATIENT)
Facility: CLINIC | Age: 35
LOS: 1 days | Discharge: HOME | End: 2020-08-08
Attending: OBSTETRICS & GYNECOLOGY | Admitting: OBSTETRICS & GYNECOLOGY
Payer: COMMERCIAL

## 2020-08-07 LAB — RUPTURE OF FETAL MEMBRANES BY ROM PLUS: NEGATIVE

## 2020-08-07 PROCEDURE — G0463 HOSPITAL OUTPT CLINIC VISIT: HCPCS

## 2020-08-07 PROCEDURE — 84112 EVAL AMNIOTIC FLUID PROTEIN: CPT | Performed by: OBSTETRICS & GYNECOLOGY

## 2020-08-07 ASSESSMENT — ACTIVITIES OF DAILY LIVING (ADL)
BATHING: 0-->INDEPENDENT
RETIRED_COMMUNICATION: 0-->UNDERSTANDS/COMMUNICATES WITHOUT DIFFICULTY
DRESS: 0-->INDEPENDENT
AMBULATION: 0-->INDEPENDENT
COGNITION: 0 - NO COGNITION ISSUES REPORTED
RETIRED_EATING: 0-->INDEPENDENT
FALL_HISTORY_WITHIN_LAST_SIX_MONTHS: NO
SWALLOWING: 0-->SWALLOWS FOODS/LIQUIDS WITHOUT DIFFICULTY
TRANSFERRING: 0-->INDEPENDENT
TOILETING: 0-->INDEPENDENT

## 2020-08-07 ASSESSMENT — MIFFLIN-ST. JEOR: SCORE: 1408.96

## 2020-08-08 ENCOUNTER — APPOINTMENT (OUTPATIENT)
Dept: ULTRASOUND IMAGING | Facility: CLINIC | Age: 35
End: 2020-08-08
Attending: OBSTETRICS & GYNECOLOGY
Payer: COMMERCIAL

## 2020-08-08 VITALS
RESPIRATION RATE: 16 BRPM | TEMPERATURE: 98.5 F | OXYGEN SATURATION: 94 % | SYSTOLIC BLOOD PRESSURE: 120 MMHG | BODY MASS INDEX: 28.7 KG/M2 | HEIGHT: 63 IN | WEIGHT: 162 LBS | HEART RATE: 99 BPM | DIASTOLIC BLOOD PRESSURE: 76 MMHG

## 2020-08-08 LAB — FERN CRYSTALLIZATION: NORMAL

## 2020-08-08 PROCEDURE — 25000132 ZZH RX MED GY IP 250 OP 250 PS 637: Performed by: OBSTETRICS & GYNECOLOGY

## 2020-08-08 PROCEDURE — 76819 FETAL BIOPHYS PROFIL W/O NST: CPT

## 2020-08-08 PROCEDURE — G0463 HOSPITAL OUTPT CLINIC VISIT: HCPCS

## 2020-08-08 RX ORDER — ACETAMINOPHEN 325 MG/1
975 TABLET ORAL ONCE
Status: COMPLETED | OUTPATIENT
Start: 2020-08-08 | End: 2020-08-08

## 2020-08-08 RX ADMIN — ACETAMINOPHEN 975 MG: 325 TABLET, FILM COATED ORAL at 00:47

## 2020-08-08 NOTE — PROVIDER NOTIFICATION
08/07/20 2330   Provider Notification   Provider Name/Title Dr Cheung   Method of Notification Phone   Request Evaluate - Remote   Notification Reason Status Update   Updated MD of ROM+ results, cx occasional, 2 palpated in 30 minutes palpating mild, fetal tracing moderate with acceleration and no decelerations, lower baseline of 115-120. External exam of perineum appears dry/no fluid on chux. MD in house and will come to bedside to perform bedside ultrasound to check for amniotic fluid.

## 2020-08-08 NOTE — DISCHARGE INSTRUCTIONS
Discharge Instruction for Undelivered Patients      You were seen for: Membrane Assessment  We Consulted: Dr Cheung  You had (Test or Medicine):uterine and fetal monitoring, ROM+ (lab to check if your water broke), Fern test (to check via a microscope if your water broke), cervical exam, and a BPP    Diet:   Drink 8 to 12 glasses of liquids (milk, juice, water) every day.     Activity:  Count fetal kicks everyday (see handout)  Call your doctor or nurse midwife if your baby is moving less than usual.     Call your provider if you notice:  Swelling in your face or increased swelling in your hands or legs.  Headaches that are not relieved by Tylenol (acetaminophen).  Changes in your vision (blurring: seeing spots or stars.)  Nausea (sick to your stomach) and vomiting (throwing up).   Weight gain of 5 pounds or more per week.  Heartburn that doesn't go away.  Signs of bladder infection: pain when you urinate (use the toilet), need to go more often and more urgently.  The bag of stanton (rupture of membranes) breaks, or you notice leaking in your underwear.  Bright red blood in your underwear.  Abdominal (lower belly) or stomach pain.  For first baby: Contractions (tightening) less than 5 minutes apart for one hour or more.  Second (plus) baby: Contractions (tightening) less than 10 minutes apart and getting stronger.  *If less than 34 weeks: Contractions (tightenings) more than 6 times in one hour.  Increase or change in vaginal discharge (note the color and amount)  Follow-up:  As scheduled in the clinic

## 2020-08-08 NOTE — PROVIDER NOTIFICATION
"   08/08/20 0032   Provider Notification   Provider Name/Title Dr Cheung   Method of Notification In Department   MD in department. Updated of episodic PD x2. Pt c/o feeling \"a headache that I wouldn't take medication for but that makes me feel off\" pt also complaining of feeling diaphoretic. 02 sats on room air 93-95%. VORB for BPP.     0035: Ultrasound called unit to notify that it will a while before they can get her in.. approximately 1hr. Dr Cheung updated.   "

## 2020-08-08 NOTE — PLAN OF CARE
Data: Patient presented to [facility]: 2020 10:00 PM.  Reason for maternal/fetal assessment is uterine contractions, leaking vaginal fluid. Patient reports thinking water broke 20 @ 2130 while having sex. Some leaking for a number of hours after initial gush, but no puddle while sleeping and no leaking during the day today. Contractions starting this morning and steadily increasing throughout day.  Patient is a .  Prenatal record reviewed. Pregnancy has been uncomplicated..  Gestational Age 38w0d. VSS. Fetal movement present. Patient denies vaginal bleeding, nausea, vomiting, headache, visual disturbances, epigastric or URQ pain, significant edema. Support person is present.   Action: Verbal consent for EFM. Triage assessment completed. Bill of rights reviewed.  Response: Patient verbalized agreement with plan. Will contact Dr Juan Cheung with update and further orders.    Patient admitted to occasional vaping, occasional drinking and occasional THC use during pregnancy.

## 2020-08-08 NOTE — PROGRESS NOTES
Data: Patient assessed in the [facility] for leaking vaginal fluid.  Cervical exam dilated to 1.5/50/-2.  Membranes intact per ROM+ and Fern.  Contractions rare via toco and palpation. BPP 8/8  Action:  Presumed adequate fetal oxygenation documented (see flow record). Discharge instructions reviewed.  Patient instructed to report change in fetal movement, vaginal leaking of fluid or bleeding, abdominal pain, or any concerns related to the pregnancy to her nurse/physician.    Response: Orders to discharge home per Juan Cheung.  Patient verbalized understanding of education and verbalized agreement with plan. Discharged to home at 0233.

## 2020-08-08 NOTE — PROVIDER NOTIFICATION
08/07/20 2683   Provider Notification   Provider Name/Title Dr Cheung   Method of Notification At Bedside   MD at bedside to perform bedside ultrasound. Per MD fluid looks appropriate. Orders to check fern and then SVE.     0016: Updated MD of Fern collected, no fluid seen, white discharge noted, SVE 1.5/50/-2 very posterior.

## 2020-08-08 NOTE — PROVIDER NOTIFICATION
08/08/20 0225   Provider Notification   Provider Name/Title Dr Cheung   Method of Notification Phone   Request Evaluate - Remote   Notification Reason Status Update   Updated  8/8, no ferning present. No cx noted on monitor but pt continue to feel intermittent crampiness that is not palpable . Fetal trainig moderate with accelerations and no decelerations. TORB to discharge to home.

## 2020-08-11 ENCOUNTER — HOSPITAL ENCOUNTER (OUTPATIENT)
Facility: CLINIC | Age: 35
Discharge: HOME | End: 2020-08-11
Attending: OBSTETRICS & GYNECOLOGY | Admitting: OBSTETRICS & GYNECOLOGY
Payer: COMMERCIAL

## 2020-08-11 ENCOUNTER — HOSPITAL ENCOUNTER (OUTPATIENT)
Dept: LAB | Facility: CLINIC | Age: 35
Discharge: HOME | End: 2020-08-11
Attending: OBSTETRICS & GYNECOLOGY | Admitting: OBSTETRICS & GYNECOLOGY
Payer: COMMERCIAL

## 2020-08-11 DIAGNOSIS — Z01.812 PRE-OPERATIVE LABORATORY EXAMINATION: ICD-10-CM

## 2020-08-11 LAB
ABO + RH BLD: NORMAL
ABO + RH BLD: NORMAL
BLD GP AB SCN SERPL QL: NORMAL
BLOOD BANK CMNT PATIENT-IMP: NORMAL
HGB BLD-MCNC: 12.6 G/DL (ref 11.7–15.7)
LABORATORY COMMENT REPORT: NORMAL
SARS-COV-2 RNA SPEC QL NAA+PROBE: NEGATIVE
SARS-COV-2 RNA SPEC QL NAA+PROBE: NORMAL
SPECIMEN EXP DATE BLD: NORMAL
SPECIMEN SOURCE: NORMAL
SPECIMEN SOURCE: NORMAL

## 2020-08-11 PROCEDURE — 86900 BLOOD TYPING SEROLOGIC ABO: CPT | Performed by: OBSTETRICS & GYNECOLOGY

## 2020-08-11 PROCEDURE — 86780 TREPONEMA PALLIDUM: CPT | Performed by: OBSTETRICS & GYNECOLOGY

## 2020-08-11 PROCEDURE — 86850 RBC ANTIBODY SCREEN: CPT | Performed by: OBSTETRICS & GYNECOLOGY

## 2020-08-11 PROCEDURE — 36415 COLL VENOUS BLD VENIPUNCTURE: CPT | Performed by: OBSTETRICS & GYNECOLOGY

## 2020-08-11 PROCEDURE — 12000000 ZZH R&B MED SURG/OB

## 2020-08-11 PROCEDURE — U0003 INFECTIOUS AGENT DETECTION BY NUCLEIC ACID (DNA OR RNA); SEVERE ACUTE RESPIRATORY SYNDROME CORONAVIRUS 2 (SARS-COV-2) (CORONAVIRUS DISEASE [COVID-19]), AMPLIFIED PROBE TECHNIQUE, MAKING USE OF HIGH THROUGHPUT TECHNOLOGIES AS DESCRIBED BY CMS-2020-01-R: HCPCS | Performed by: OBSTETRICS & GYNECOLOGY

## 2020-08-11 PROCEDURE — 85018 HEMOGLOBIN: CPT | Performed by: OBSTETRICS & GYNECOLOGY

## 2020-08-11 PROCEDURE — 86901 BLOOD TYPING SEROLOGIC RH(D): CPT | Performed by: OBSTETRICS & GYNECOLOGY

## 2020-08-11 RX ORDER — SERTRALINE HYDROCHLORIDE 100 MG/1
100 TABLET, FILM COATED ORAL DAILY
COMMUNITY
End: 2020-09-29

## 2020-08-11 NOTE — PHARMACY-ADMISSION MEDICATION HISTORY
Admission medication history interview status for this patient is complete. See [facility] admission navigator for allergy information, prior to admission medications and immunization status.     PTA meds completed by pre-admitting nurse Tawana Barrera and reviewed by pharmacy.    Prior to Admission medications    Medication Sig Last Dose Taking? Auth Provider   Famotidine 20 MG CHEW Take by mouth At Bedtime  Yes Reported, Patient   Prenatal Vit-Fe Fumarate-FA (PRENATAL COMPLETE) 14-0.4 MG TABS   Yes Sosa Posey PA   sertraline (ZOLOFT) 100 MG tablet Take 100 mg by mouth daily  Yes Unknown, Entered By History

## 2020-08-12 LAB — T PALLIDUM AB SER QL: NONREACTIVE

## 2020-08-14 ENCOUNTER — ANESTHESIA (OUTPATIENT)
Dept: OBGYN | Facility: CLINIC | Age: 35
End: 2020-08-14
Payer: COMMERCIAL

## 2020-08-14 ENCOUNTER — ANESTHESIA EVENT (OUTPATIENT)
Dept: OBGYN | Facility: CLINIC | Age: 35
End: 2020-08-14
Payer: COMMERCIAL

## 2020-08-14 ENCOUNTER — HOSPITAL ENCOUNTER (INPATIENT)
Facility: CLINIC | Age: 35
LOS: 2 days | Discharge: HOME HEALTH | End: 2020-08-16
Attending: OBSTETRICS & GYNECOLOGY | Admitting: OBSTETRICS & GYNECOLOGY
Payer: COMMERCIAL

## 2020-08-14 LAB
AMPHETAMINES UR QL SCN: NEGATIVE
CANNABINOIDS UR QL: ABNORMAL
COCAINE UR QL: NEGATIVE
CREAT UR-MCNC: 107 MG/DL
OPIATES UR QL SCN: NEGATIVE
PCP UR QL SCN: NEGATIVE

## 2020-08-14 PROCEDURE — 80307 DRUG TEST PRSMV CHEM ANLYZR: CPT | Performed by: OBSTETRICS & GYNECOLOGY

## 2020-08-14 PROCEDURE — 37000008 ZZH ANESTHESIA TECHNICAL FEE, 1ST 30 MIN: Performed by: OBSTETRICS & GYNECOLOGY

## 2020-08-14 PROCEDURE — 25000128 H RX IP 250 OP 636: Performed by: OBSTETRICS & GYNECOLOGY

## 2020-08-14 PROCEDURE — 25000125 ZZHC RX 250: Performed by: OBSTETRICS & GYNECOLOGY

## 2020-08-14 PROCEDURE — 71000012 ZZH RECOVERY PHASE 1 LEVEL 1 FIRST HR: Performed by: OBSTETRICS & GYNECOLOGY

## 2020-08-14 PROCEDURE — 25800030 ZZH RX IP 258 OP 636: Performed by: OBSTETRICS & GYNECOLOGY

## 2020-08-14 PROCEDURE — 0UT70ZZ RESECTION OF BILATERAL FALLOPIAN TUBES, OPEN APPROACH: ICD-10-PCS | Performed by: OBSTETRICS & GYNECOLOGY

## 2020-08-14 PROCEDURE — 88302 TISSUE EXAM BY PATHOLOGIST: CPT | Mod: 26 | Performed by: OBSTETRICS & GYNECOLOGY

## 2020-08-14 PROCEDURE — 86780 TREPONEMA PALLIDUM: CPT | Performed by: OBSTETRICS & GYNECOLOGY

## 2020-08-14 PROCEDURE — 88302 TISSUE EXAM BY PATHOLOGIST: CPT | Performed by: OBSTETRICS & GYNECOLOGY

## 2020-08-14 PROCEDURE — 37000009 ZZH ANESTHESIA TECHNICAL FEE, EACH ADDTL 15 MIN: Performed by: OBSTETRICS & GYNECOLOGY

## 2020-08-14 PROCEDURE — 36000058 ZZH SURGERY LEVEL 3 EA 15 ADDTL MIN: Performed by: OBSTETRICS & GYNECOLOGY

## 2020-08-14 PROCEDURE — 80349 CANNABINOIDS NATURAL: CPT | Performed by: OBSTETRICS & GYNECOLOGY

## 2020-08-14 PROCEDURE — 25000132 ZZH RX MED GY IP 250 OP 250 PS 637: Performed by: OBSTETRICS & GYNECOLOGY

## 2020-08-14 PROCEDURE — 25000125 ZZHC RX 250: Performed by: NURSE ANESTHETIST, CERTIFIED REGISTERED

## 2020-08-14 PROCEDURE — 36000056 ZZH SURGERY LEVEL 3 1ST 30 MIN: Performed by: OBSTETRICS & GYNECOLOGY

## 2020-08-14 PROCEDURE — 12000000 ZZH R&B MED SURG/OB

## 2020-08-14 PROCEDURE — 25800030 ZZH RX IP 258 OP 636: Performed by: NURSE ANESTHETIST, CERTIFIED REGISTERED

## 2020-08-14 PROCEDURE — 27210794 ZZH OR GENERAL SUPPLY STERILE: Performed by: OBSTETRICS & GYNECOLOGY

## 2020-08-14 PROCEDURE — 25000128 H RX IP 250 OP 636: Performed by: NURSE ANESTHETIST, CERTIFIED REGISTERED

## 2020-08-14 RX ORDER — HYDROMORPHONE HYDROCHLORIDE 1 MG/ML
0.2 INJECTION, SOLUTION INTRAMUSCULAR; INTRAVENOUS; SUBCUTANEOUS
Status: DISCONTINUED | OUTPATIENT
Start: 2020-08-14 | End: 2020-08-15

## 2020-08-14 RX ORDER — NALBUPHINE HYDROCHLORIDE 20 MG/ML
2.5-5 INJECTION, SOLUTION INTRAMUSCULAR; INTRAVENOUS; SUBCUTANEOUS EVERY 6 HOURS PRN
Status: DISCONTINUED | OUTPATIENT
Start: 2020-08-14 | End: 2020-08-14

## 2020-08-14 RX ORDER — NALOXONE HYDROCHLORIDE 0.4 MG/ML
.1-.4 INJECTION, SOLUTION INTRAMUSCULAR; INTRAVENOUS; SUBCUTANEOUS
Status: DISCONTINUED | OUTPATIENT
Start: 2020-08-14 | End: 2020-08-14

## 2020-08-14 RX ORDER — CLINDAMYCIN PHOSPHATE 900 MG/50ML
900 INJECTION, SOLUTION INTRAVENOUS
Status: COMPLETED | OUTPATIENT
Start: 2020-08-14 | End: 2020-08-14

## 2020-08-14 RX ORDER — DEXTROSE, SODIUM CHLORIDE, SODIUM LACTATE, POTASSIUM CHLORIDE, AND CALCIUM CHLORIDE 5; .6; .31; .03; .02 G/100ML; G/100ML; G/100ML; G/100ML; G/100ML
INJECTION, SOLUTION INTRAVENOUS CONTINUOUS
Status: DISCONTINUED | OUTPATIENT
Start: 2020-08-14 | End: 2020-08-16 | Stop reason: HOSPADM

## 2020-08-14 RX ORDER — LIDOCAINE 40 MG/G
CREAM TOPICAL
Status: DISCONTINUED | OUTPATIENT
Start: 2020-08-14 | End: 2020-08-14

## 2020-08-14 RX ORDER — OXYCODONE HYDROCHLORIDE 5 MG/1
5 TABLET ORAL EVERY 4 HOURS PRN
Status: DISCONTINUED | OUTPATIENT
Start: 2020-08-14 | End: 2020-08-16 | Stop reason: HOSPADM

## 2020-08-14 RX ORDER — MEPERIDINE HYDROCHLORIDE 25 MG/ML
12.5 INJECTION INTRAMUSCULAR; INTRAVENOUS; SUBCUTANEOUS
Status: DISCONTINUED | OUTPATIENT
Start: 2020-08-14 | End: 2020-08-15

## 2020-08-14 RX ORDER — SODIUM CHLORIDE, SODIUM LACTATE, POTASSIUM CHLORIDE, CALCIUM CHLORIDE 600; 310; 30; 20 MG/100ML; MG/100ML; MG/100ML; MG/100ML
INJECTION, SOLUTION INTRAVENOUS CONTINUOUS
Status: DISCONTINUED | OUTPATIENT
Start: 2020-08-14 | End: 2020-08-14

## 2020-08-14 RX ORDER — FENTANYL CITRATE 50 UG/ML
25-50 INJECTION, SOLUTION INTRAMUSCULAR; INTRAVENOUS
Status: DISCONTINUED | OUTPATIENT
Start: 2020-08-14 | End: 2020-08-15

## 2020-08-14 RX ORDER — OXYTOCIN/0.9 % SODIUM CHLORIDE 30/500 ML
PLASTIC BAG, INJECTION (ML) INTRAVENOUS PRN
Status: DISCONTINUED | OUTPATIENT
Start: 2020-08-14 | End: 2020-08-14

## 2020-08-14 RX ORDER — TRANEXAMIC ACID 10 MG/ML
1 INJECTION, SOLUTION INTRAVENOUS EVERY 30 MIN PRN
Status: DISCONTINUED | OUTPATIENT
Start: 2020-08-14 | End: 2020-08-16 | Stop reason: HOSPADM

## 2020-08-14 RX ORDER — HYDROMORPHONE HYDROCHLORIDE 1 MG/ML
.3-.5 INJECTION, SOLUTION INTRAMUSCULAR; INTRAVENOUS; SUBCUTANEOUS EVERY 10 MIN PRN
Status: DISCONTINUED | OUTPATIENT
Start: 2020-08-14 | End: 2020-08-14

## 2020-08-14 RX ORDER — LIDOCAINE 40 MG/G
CREAM TOPICAL
Status: DISCONTINUED | OUTPATIENT
Start: 2020-08-14 | End: 2020-08-16 | Stop reason: HOSPADM

## 2020-08-14 RX ORDER — FENTANYL CITRATE-0.9 % NACL/PF 10 MCG/ML
100 PLASTIC BAG, INJECTION (ML) INTRAVENOUS EVERY 5 MIN PRN
Status: DISCONTINUED | OUTPATIENT
Start: 2020-08-14 | End: 2020-08-14

## 2020-08-14 RX ORDER — DIMENHYDRINATE 50 MG/ML
25 INJECTION, SOLUTION INTRAMUSCULAR; INTRAVENOUS
Status: DISCONTINUED | OUTPATIENT
Start: 2020-08-14 | End: 2020-08-15

## 2020-08-14 RX ORDER — EPHEDRINE SULFATE 50 MG/ML
INJECTION, SOLUTION INTRAVENOUS PRN
Status: DISCONTINUED | OUTPATIENT
Start: 2020-08-14 | End: 2020-08-14

## 2020-08-14 RX ORDER — MODIFIED LANOLIN
OINTMENT (GRAM) TOPICAL
Status: DISCONTINUED | OUTPATIENT
Start: 2020-08-14 | End: 2020-08-16 | Stop reason: HOSPADM

## 2020-08-14 RX ORDER — CALCIUM CARBONATE 500 MG/1
1 TABLET, CHEWABLE ORAL 2 TIMES DAILY
COMMUNITY
End: 2020-09-29

## 2020-08-14 RX ORDER — DEXAMETHASONE SODIUM PHOSPHATE 4 MG/ML
INJECTION, SOLUTION INTRA-ARTICULAR; INTRALESIONAL; INTRAMUSCULAR; INTRAVENOUS; SOFT TISSUE PRN
Status: DISCONTINUED | OUTPATIENT
Start: 2020-08-14 | End: 2020-08-14

## 2020-08-14 RX ORDER — IBUPROFEN 800 MG/1
800 TABLET, FILM COATED ORAL EVERY 6 HOURS
Status: DISCONTINUED | OUTPATIENT
Start: 2020-08-15 | End: 2020-08-16 | Stop reason: HOSPADM

## 2020-08-14 RX ORDER — BISACODYL 10 MG
10 SUPPOSITORY, RECTAL RECTAL DAILY PRN
Status: DISCONTINUED | OUTPATIENT
Start: 2020-08-16 | End: 2020-08-16 | Stop reason: HOSPADM

## 2020-08-14 RX ORDER — AMOXICILLIN 250 MG
2 CAPSULE ORAL 2 TIMES DAILY
Status: DISCONTINUED | OUTPATIENT
Start: 2020-08-14 | End: 2020-08-16 | Stop reason: HOSPADM

## 2020-08-14 RX ORDER — OXYTOCIN/0.9 % SODIUM CHLORIDE 30/500 ML
100 PLASTIC BAG, INJECTION (ML) INTRAVENOUS CONTINUOUS
Status: DISCONTINUED | OUTPATIENT
Start: 2020-08-14 | End: 2020-08-16 | Stop reason: HOSPADM

## 2020-08-14 RX ORDER — SCOLOPAMINE TRANSDERMAL SYSTEM 1 MG/1
1 PATCH, EXTENDED RELEASE TRANSDERMAL ONCE
Status: DISCONTINUED | OUTPATIENT
Start: 2020-08-14 | End: 2020-08-14

## 2020-08-14 RX ORDER — ACETAMINOPHEN 325 MG/1
975 TABLET ORAL EVERY 6 HOURS
Status: DISCONTINUED | OUTPATIENT
Start: 2020-08-14 | End: 2020-08-16 | Stop reason: HOSPADM

## 2020-08-14 RX ORDER — OXYTOCIN 10 [USP'U]/ML
10 INJECTION, SOLUTION INTRAMUSCULAR; INTRAVENOUS
Status: DISCONTINUED | OUTPATIENT
Start: 2020-08-14 | End: 2020-08-16 | Stop reason: HOSPADM

## 2020-08-14 RX ORDER — SODIUM CHLORIDE, SODIUM LACTATE, POTASSIUM CHLORIDE, CALCIUM CHLORIDE 600; 310; 30; 20 MG/100ML; MG/100ML; MG/100ML; MG/100ML
INJECTION, SOLUTION INTRAVENOUS CONTINUOUS PRN
Status: DISCONTINUED | OUTPATIENT
Start: 2020-08-14 | End: 2020-08-14

## 2020-08-14 RX ORDER — OXYTOCIN/0.9 % SODIUM CHLORIDE 30/500 ML
340 PLASTIC BAG, INJECTION (ML) INTRAVENOUS CONTINUOUS PRN
Status: DISCONTINUED | OUTPATIENT
Start: 2020-08-14 | End: 2020-08-16 | Stop reason: HOSPADM

## 2020-08-14 RX ORDER — SIMETHICONE 80 MG
80 TABLET,CHEWABLE ORAL 4 TIMES DAILY PRN
Status: DISCONTINUED | OUTPATIENT
Start: 2020-08-14 | End: 2020-08-16 | Stop reason: HOSPADM

## 2020-08-14 RX ORDER — NALOXONE HYDROCHLORIDE 0.4 MG/ML
.1-.4 INJECTION, SOLUTION INTRAMUSCULAR; INTRAVENOUS; SUBCUTANEOUS
Status: DISCONTINUED | OUTPATIENT
Start: 2020-08-14 | End: 2020-08-16 | Stop reason: HOSPADM

## 2020-08-14 RX ORDER — ONDANSETRON 2 MG/ML
4 INJECTION INTRAMUSCULAR; INTRAVENOUS EVERY 6 HOURS PRN
Status: DISCONTINUED | OUTPATIENT
Start: 2020-08-14 | End: 2020-08-16 | Stop reason: HOSPADM

## 2020-08-14 RX ORDER — ALBUTEROL SULFATE 0.83 MG/ML
2.5 SOLUTION RESPIRATORY (INHALATION) EVERY 4 HOURS PRN
Status: DISCONTINUED | OUTPATIENT
Start: 2020-08-14 | End: 2020-08-14 | Stop reason: HOSPADM

## 2020-08-14 RX ORDER — AMOXICILLIN 250 MG
1 CAPSULE ORAL 2 TIMES DAILY
Status: DISCONTINUED | OUTPATIENT
Start: 2020-08-14 | End: 2020-08-16 | Stop reason: HOSPADM

## 2020-08-14 RX ORDER — PHENYLEPHRINE HYDROCHLORIDE 10 MG/ML
INJECTION INTRAVENOUS PRN
Status: DISCONTINUED | OUTPATIENT
Start: 2020-08-14 | End: 2020-08-14

## 2020-08-14 RX ORDER — CITRIC ACID/SODIUM CITRATE 334-500MG
30 SOLUTION, ORAL ORAL
Status: COMPLETED | OUTPATIENT
Start: 2020-08-14 | End: 2020-08-14

## 2020-08-14 RX ORDER — ONDANSETRON 2 MG/ML
4 INJECTION INTRAMUSCULAR; INTRAVENOUS EVERY 30 MIN PRN
Status: DISCONTINUED | OUTPATIENT
Start: 2020-08-14 | End: 2020-08-14

## 2020-08-14 RX ORDER — SERTRALINE HYDROCHLORIDE 100 MG/1
100 TABLET, FILM COATED ORAL DAILY
Status: DISCONTINUED | OUTPATIENT
Start: 2020-08-14 | End: 2020-08-16 | Stop reason: HOSPADM

## 2020-08-14 RX ORDER — SODIUM CHLORIDE, SODIUM LACTATE, POTASSIUM CHLORIDE, CALCIUM CHLORIDE 600; 310; 30; 20 MG/100ML; MG/100ML; MG/100ML; MG/100ML
INJECTION, SOLUTION INTRAVENOUS CONTINUOUS
Status: DISCONTINUED | OUTPATIENT
Start: 2020-08-14 | End: 2020-08-15

## 2020-08-14 RX ORDER — KETOROLAC TROMETHAMINE 30 MG/ML
30 INJECTION, SOLUTION INTRAMUSCULAR; INTRAVENOUS EVERY 6 HOURS
Status: COMPLETED | OUTPATIENT
Start: 2020-08-14 | End: 2020-08-15

## 2020-08-14 RX ORDER — ONDANSETRON 4 MG/1
4 TABLET, ORALLY DISINTEGRATING ORAL EVERY 30 MIN PRN
Status: DISCONTINUED | OUTPATIENT
Start: 2020-08-14 | End: 2020-08-15

## 2020-08-14 RX ORDER — HYDROCORTISONE 2.5 %
CREAM (GRAM) TOPICAL 3 TIMES DAILY PRN
Status: DISCONTINUED | OUTPATIENT
Start: 2020-08-14 | End: 2020-08-16 | Stop reason: HOSPADM

## 2020-08-14 RX ORDER — FENTANYL CITRATE 50 UG/ML
25-50 INJECTION, SOLUTION INTRAMUSCULAR; INTRAVENOUS
Status: DISCONTINUED | OUTPATIENT
Start: 2020-08-14 | End: 2020-08-14

## 2020-08-14 RX ADMIN — PHENYLEPHRINE HYDROCHLORIDE 100 MCG: 10 INJECTION INTRAVENOUS at 09:17

## 2020-08-14 RX ADMIN — DOCUSATE SODIUM AND SENNOSIDES 1 TABLET: 8.6; 5 TABLET ORAL at 20:15

## 2020-08-14 RX ADMIN — EPHEDRINE SULFATE 5 MG: 50 INJECTION, SOLUTION INTRAVENOUS at 09:17

## 2020-08-14 RX ADMIN — GENTAMICIN SULFATE 90 MG: 40 INJECTION, SOLUTION INTRAMUSCULAR; INTRAVENOUS at 08:54

## 2020-08-14 RX ADMIN — OXYTOCIN-SODIUM CHLORIDE 0.9% IV SOLN 30 UNIT/500ML 250 ML: 30-0.9/5 SOLUTION at 09:40

## 2020-08-14 RX ADMIN — SODIUM CHLORIDE, SODIUM LACTATE, POTASSIUM CHLORIDE, CALCIUM CHLORIDE AND DEXTROSE MONOHYDRATE: 5; 600; 310; 30; 20 INJECTION, SOLUTION INTRAVENOUS at 17:27

## 2020-08-14 RX ADMIN — Medication 100 ML/HR: at 12:16

## 2020-08-14 RX ADMIN — PHENYLEPHRINE HYDROCHLORIDE 100 MCG: 10 INJECTION INTRAVENOUS at 09:28

## 2020-08-14 RX ADMIN — CLINDAMYCIN IN 5 PERCENT DEXTROSE 900 MG: 18 INJECTION, SOLUTION INTRAVENOUS at 09:01

## 2020-08-14 RX ADMIN — SODIUM CHLORIDE, POTASSIUM CHLORIDE, SODIUM LACTATE AND CALCIUM CHLORIDE: 600; 310; 30; 20 INJECTION, SOLUTION INTRAVENOUS at 10:14

## 2020-08-14 RX ADMIN — KETOROLAC TROMETHAMINE 30 MG: 30 INJECTION, SOLUTION INTRAMUSCULAR at 23:02

## 2020-08-14 RX ADMIN — CLINDAMYCIN IN 5 PERCENT DEXTROSE 900 MG: 18 INJECTION, SOLUTION INTRAVENOUS at 09:22

## 2020-08-14 RX ADMIN — ACETAMINOPHEN 975 MG: 325 TABLET, FILM COATED ORAL at 20:15

## 2020-08-14 RX ADMIN — SODIUM CHLORIDE, POTASSIUM CHLORIDE, SODIUM LACTATE AND CALCIUM CHLORIDE: 600; 310; 30; 20 INJECTION, SOLUTION INTRAVENOUS at 08:11

## 2020-08-14 RX ADMIN — KETOROLAC TROMETHAMINE 30 MG: 30 INJECTION, SOLUTION INTRAMUSCULAR at 16:41

## 2020-08-14 RX ADMIN — DEXAMETHASONE SODIUM PHOSPHATE 4 MG: 4 INJECTION, SOLUTION INTRA-ARTICULAR; INTRALESIONAL; INTRAMUSCULAR; INTRAVENOUS; SOFT TISSUE at 09:24

## 2020-08-14 RX ADMIN — EPHEDRINE SULFATE 5 MG: 50 INJECTION, SOLUTION INTRAVENOUS at 09:28

## 2020-08-14 RX ADMIN — SERTRALINE HYDROCHLORIDE 100 MG: 100 TABLET ORAL at 20:30

## 2020-08-14 RX ADMIN — SODIUM CITRATE AND CITRIC ACID MONOHYDRATE 30 ML: 500; 334 SOLUTION ORAL at 08:55

## 2020-08-14 RX ADMIN — SODIUM CHLORIDE, POTASSIUM CHLORIDE, SODIUM LACTATE AND CALCIUM CHLORIDE 200 ML/HR: 600; 310; 30; 20 INJECTION, SOLUTION INTRAVENOUS at 08:48

## 2020-08-14 RX ADMIN — ACETAMINOPHEN 975 MG: 325 TABLET, FILM COATED ORAL at 13:29

## 2020-08-14 RX ADMIN — PHENYLEPHRINE HYDROCHLORIDE 50 MCG: 10 INJECTION INTRAVENOUS at 09:24

## 2020-08-14 ASSESSMENT — MIFFLIN-ST. JEOR: SCORE: 1408.96

## 2020-08-14 NOTE — PLAN OF CARE
Patient meeting expected goals. Tolerating regular diet. Bonding well with . Using tylenol and Toradol for pain. Education and room orientation done. Plan to remove denny when patient has no numbness in legs.

## 2020-08-14 NOTE — OP NOTE
[facility]  Obstetrics Operative Report    Surgery Date:  2020  Surgeon(s): Octavia Maya MD   Assistants:  none    Preoperative Diagnoses:  Intrauterine pregnancy at 39w0d  Desires permanent sterilization    Postoperative diagnoses: Same     Procedure performed:  repeat low segment transverse  section with bilateral salpingectomy     Anesthesia:  Spinal with duramorph  Est Blood Loss (mL): 105 mL  Fluid replacement: 1000 mL lactated Ringer's.   Urine output: 75 ml clear yellow   Specimens: cord blood/segment, bilateral fallopian tubes  Complications: None apparent    Operative findings:   1. Single viable female infant at 0938 hours on 2020. Apgars of 8 and 9 at one and five minutes.  Birth weight:: 6#12 oz.  Fetal presentation: cephalic. Amniotic fluid: clear. Placenta intact with 3 vessel cord.    2.Normal appearing uterus, fallopian tubes, ovaries.  Very thin lower uterine segment. Bladder adehered to the lower uterine segment  3. Rectus muscles adhered at midline, adhered to fascia with moderate adhesions.     Indication: Jonelle Khan is a 34 year old, , who was admitted at 39w0d for repeat  with bilateral salpingectomy for permanent sterilization, done with child bearing. The risks, benefits, and alternatives of  delivery were explained and the patient agreed to proceed.     Procedure details:  After obtaining informed consent the patient was taken to the operating room. A safety patient time out was performed prior to the procedure. SCD's were placed. The patient received spinal anesthesia with duramoprh prior to the skin incision. A denny was placed. She was placed in the dorsal supine position with a leftward tilt and prepped and draped in the usual sterile fashion. Nnursing staff were present and available for baby.     Following test of adequate anesthesia, the abdomen was entered through a Pfannenstiel skin incision, through her previous  scar. The skin incision was made sharply and carried through the subcutaneous tissue to the fascia.  Fascia was incised in the midline and extended laterally with the Koroma scissors.  The superior margin of the fascial incision was grasped with Kocher clamps and dissected from the underlying muscle sharp and blunt dissecton, which was then repeated at the lower margin of the fascial incision. Adhesions noted in this layer.  The muscle was  in the midline.  The peritoneum was entered sharply superior with metzembaum scissors and the opening extended by sharp dissection with care to avoid the bladder. Waqar O retractor placed.  The vesicouterine peritoneum was entered sharply with Metzenbaum scissors and incision extended laterally. The bladder flap was created digitally. The lower segment of the uterus was opened sharply in a transverse fashion and extended with digital pressure. The infant's head was elevated to the level of the hysterotomy and was delivered atraumatically. The infant's body followed thereafter with fundal pressure. Nuchal x 1, loose, reduced post delivery. Oxytocin was given through the running IV. The cord was doubly clamped after delayed cord clamping of 60 seconds and cut and the infant was handed off to the waiting nursing staff. A segment of the cord was cut and set aside for cord gases if needed. Cord blood was obtained.     The placenta was extracted with fundal massage and cord traction.  The uterus was cleared of all clots and debris.  The uterus was massaged and was noted to be firm.   With vigorous massage as well as administration of oxytocin, good uterine tone was achieved. The hysterotomy was repaired with 0-vicryl suture in a running locked fashion. A 2nd layer of 0-vicryl in a running, non-locked fashion was used to imbricate the incision and good hemostasis was achieved. The bladder flap was inspected and found to be hemostatic. Some serosal bleeding noted superior to the  incision which was made hemostatic with the bovie.     The attention was then turned to the fallopian tubes. Confirmation of desire for permanent sterilization completed. Right fallopian tube was grasped and carried to the fimbriated end. Using the handheld ligasure, the mesosalpinx inferior to the fallopian tube was grasped and cauterized and ligated up to the cornua. The cornua was cauterized and transected and fallopian tube sent to pathology. The left fallopian tube was similarly identified, grasped and carried out to the fimbriated end and mesosalpinx inferior to the tube was cauterized and transected up to the cornua. The cornua was cauterized and transected and the fallopian tube sent to pathology. Mesosalpinx was inspected and found to be nonhemostatic on the right, made hemostatic with bovie electrocautery.     The bilateral pericolic gutters were cleared of clots.  The hysterotomy was again inspected and found to have no active sites of bleeding.  The abdominal wall was examined and found to have no active sites of bleeding.  Rectus was made hemostatic with bovie. Bilateral adnexa again evaluated and no bleeding noted.     The fascia was closed with a running suture of 0-vicryl.  Subcutaneous tissue was irrigated. Areas that were oozing were controlled with cautery. The subcutaneous tissue was reapproximated with 3-0 plain suture. The skin was closed with 4-0 vicryl and exofin skin glue.     The patient tolerated the procedure well and was taken to the recovery room in stable condition. All sponge, needle and instrument counts were correct x2.     MD Jesica Leal OB/GYN  8/14/2020 10:23 AM

## 2020-08-14 NOTE — BRIEF OP NOTE
[facility]    Brief Operative Note    Pre-operative diagnosis: Previous  section [Z98.891]  Sterilization [Z30.2]  Post-operative diagnosis Same as pre-operative diagnosis    Procedure: Procedure(s):  Repeat  SECTION, WITH BILATERAL SALPINGECTOMY  Surgeon: Surgeon(s) and Role:     * Octavia Maya MD - Primary  Anesthesia: Spinal   Estimated blood loss: 105 ml   Drains: None  Specimens:   ID Type Source Tests Collected by Time Destination   1 :  Cord blood Blood OR DOCUMENTATION ONLY Octavia Maya MD 2020  9:46 AM    2 :  Tissue Umbilical Cord SEE PROVIDERS ORDERS Octavia Maya MD 2020  9:48 AM    3 :  Tissue Fallopian Tube, Bilateral SEE PROVIDERS ORDERS Octavia Maya MD 2020  9:50 AM      Findings:   see op note.  Complications: None.  Implants: * No implants in log *    Octavia Maya MD  2020  10:23 AM

## 2020-08-14 NOTE — PROVIDER NOTIFICATION
08/14/20 1500   Provider Notification   Provider Name/Title Dr. Maya   Method of Notification Phone   Request Evaluate-Remote   Notification Reason Status Update   FYI to MD that denny catheter still in place (patient on ERAS protocol) due to lower extremity numbness. Will remove denny as able.

## 2020-08-14 NOTE — ANESTHESIA PREPROCEDURE EVALUATION
Anesthesia Pre-Procedure Evaluation    Patient: Jonelle Khan   MRN: [id number] : 1985          Preoperative Diagnosis: Previous  section [Z98.891]  Sterilization [Z30.2]    Procedure(s):  Repeat  SECTION, WITH BILATERAL SALPINGECTOMY    Past Medical History:   Diagnosis Date     Anxiety      Asthma      Depressive disorder      Kidney infection      LSIL (low grade squamous intraepithelial lesion) on Pap smear 7/20/10    resolved     Mild major depression (H)      Sleep apnea     no cpap     Past Surgical History:   Procedure Laterality Date     C NONSPECIFIC PROCEDURE  age 10     chalazion removal under general       SECTION  12/10/2013    Procedure:  SECTION;   Section for failure to progress. ;  Surgeon: Monique Can MD;  Location: [facility] L+D      SECTION N/A 2018    Procedure: REPEAT  SECTION;  Surgeon: Jose Tyler MD;  Location: [facility] OR     LAPAROSCOPIC CHOLECYSTECTOMY N/A 2015    Procedure: LAPAROSCOPIC CHOLECYSTECTOMY;  Surgeon: Yuliet Hutton MD;  Location: [facility] OR     Anesthesia Evaluation     .             ROS/MED HX    ENT/Pulmonary:     (+)sleep apnea, asthma , . .    Neurologic:  - neg neurologic ROS     Cardiovascular:  - neg cardiovascular ROS       METS/Exercise Tolerance:     Hematologic:  - neg hematologic  ROS       Musculoskeletal:  - neg musculoskeletal ROS       GI/Hepatic:  - neg GI/hepatic ROS       Renal/Genitourinary:  - ROS Renal section negative       Endo:  - neg endo ROS       Psychiatric:     (+) psychiatric history anxiety and depression      Infectious Disease:  - neg infectious disease ROS       Malignancy:         Other: Comment: .Lab Test        20                       1142          0722          1840          1124          1513          0620          WBC           --           --           --          7.1           10.1         9.7           HGB          12.6         11.9         11.9         14.4         14.1         12.9          MCV           --           --           --          92           93           91            PLT           --           --           --          201          220          175            Lab Test        12/23/17 01/07/17 01/06/17                       1124          0620          1100          NA           138          142          142           POTASSIUM    4.1          3.5          3.8           CHLORIDE     106          110*         109           CO2          27 24 23            BUN          8            7            10            CR           0.58         0.59         0.75          ANIONGAP     5            8            10            AASHISH          9.1          8.6          8.8           GLC          84           89           196*                                   Physical Exam  Normal systems: cardiovascular and pulmonary    Airway   Mallampati: II    Dental     Cardiovascular   Rhythm and rate: regular and normal      Pulmonary    breath sounds clear to auscultation            Lab Results   Component Value Date    WBC 7.1 12/23/2017    HGB 12.6 08/11/2020    HCT 43.4 12/23/2017     12/23/2017    CRP <5.0 05/18/2011    SED 7 05/18/2011     12/23/2017    POTASSIUM 4.1 12/23/2017    CHLORIDE 106 12/23/2017    CO2 27 12/23/2017    BUN 8 12/23/2017    CR 0.58 12/23/2017    GLC 84 12/23/2017    AASHISH 9.1 12/23/2017    ALBUMIN 4.3 12/23/2017    PROTTOTAL 7.9 12/23/2017    ALT 23 12/23/2017    AST 16 12/23/2017    ALKPHOS 54 12/23/2017    BILITOTAL 0.5 12/23/2017    LIPASE 105 01/06/2017    TSH 2.15 12/23/2017    HCG Negative 11/11/2015    HCGS Negative 01/06/2017       Preop Vitals  BP Readings from Last 3 Encounters:   08/07/20 120/76   07/30/20 110/75   05/08/19 126/82    Pulse Readings from Last 3 Encounters:   08/07/20 99   05/08/19 88   02/11/19 74      Resp  "Readings from Last 3 Encounters:   08/14/20 18   08/07/20 16   07/30/20 16    SpO2 Readings from Last 3 Encounters:   08/08/20 94%   02/11/19 98%   12/21/18 99%      Temp Readings from Last 1 Encounters:   08/07/20 98.5  F (36.9  C) (Oral)    Ht Readings from Last 1 Encounters:   08/14/20 1.6 m (5' 3\")      Wt Readings from Last 1 Encounters:   08/14/20 73.5 kg (162 lb)    Estimated body mass index is 28.7 kg/m  as calculated from the following:    Height as of this encounter: 1.6 m (5' 3\").    Weight as of this encounter: 73.5 kg (162 lb).       Anesthesia Plan      History & Physical Review  History and physical reviewed and following examination; no interval change.    ASA Status:  2 .        Plan for Spinal            Postoperative Care  Postoperative pain management:  IV analgesics, Oral pain medications and Multi-modal analgesia.      Consents                 Quincy Bunch DO                    .  "

## 2020-08-14 NOTE — ANESTHESIA CARE TRANSFER NOTE
Patient: Jonelle Khan    Procedure(s):  Repeat  SECTION, WITH BILATERAL SALPINGECTOMY    Diagnosis: Previous  section [Z98.891]  Sterilization [Z30.2]  Diagnosis Additional Information: No value filed.    Anesthesia Type:   Spinal     Note:  Airway :Room Air  Patient transferred to:Labor and Delivery  Comments: VSS, report to RN, no anesthetic complications. Handoff Report: Identifed the Patient, Identified the Reponsible Provider, Reviewed the pertinent medical history, Discussed the surgical course, Reviewed Intra-OP anesthesia mangement and issues during anesthesia and Allowed opportunity for questions and acknowledgement of understanding      Vitals: (Last set prior to Anesthesia Care Transfer)    CRNA VITALS  2020 0952 - 2020 1025      2020             Pulse:  69    SpO2:  95 %                Electronically Signed By: JUAN Garcia CRNA  2020  10:25 AM

## 2020-08-14 NOTE — H&P
"OB Brief Admit H&P    No significant change in general health status based on examination of the patient, review of Nursing Admission Database and prenatal record.    Pt is a 34 year old  @ 39w0d who presented to L&D for repeat  section and tubal ligation.    Patient's prenatal course has been complicated by   1/prior c/s  2.asthma- mild intermittent  3. Anxiety/depression/bipolar    Prenatal Labs:    Blood type A+  Rubella immune  HIV/Hep B negative, RPR nR  GCT- passed  GBS negative     EFW: 7#    Resp 18   Ht 1.6 m (5' 3\")   Wt 73.5 kg (162 lb)   LMP 11/15/2019 (Approximate)   BMI 28.70 kg/m    EFM:  150s    Assessment:  34 year old  @ 39w0d admitted for repeat  and bilateral salpingectomy for tubal ligation, done with child bearing.     Plan:  1. Admit to labor and delivery   2.  Plan rpt c/s with bilateral salpingectomy. Risks of surgery including infection, injury to bladder/bowel/infant, infection, bleeding req further surgery or blood transfusion. Discussed risk of anesthesia. Discussed risks of tubal including regret (pt and partner states they are done), failure (very low with salpingectomy), not able to do this during procedure with scar tissue. Pt understands and consents.     COVID negative  HGB 12.6      Octavia Maya MD  2020  9:06 AM    "

## 2020-08-14 NOTE — ANESTHESIA POSTPROCEDURE EVALUATION
Patient: Jonelle Khan    Procedure(s):  Repeat  SECTION, WITH BILATERAL SALPINGECTOMY    Diagnosis:Previous  section [Z98.891]  Sterilization [Z30.2]  Diagnosis Additional Information: No value filed.    Anesthesia Type:  Spinal    Note:  Anesthesia Post Evaluation    Patient location during evaluation: PACU  Patient participation: Able to fully participate in evaluation  Level of consciousness: awake  Pain management: adequate  Airway patency: patent  Cardiovascular status: acceptable  Respiratory status: acceptable  Hydration status: acceptable  PONV: controlled     Anesthetic complications: None    Comments: .Anticipate full return of neurologic function          Last vitals:  Vitals:    20 1100 20 1105 20 1110   BP: (!) 89/51 90/57 (!) 86/53   Resp:   16   SpO2:            Electronically Signed By: Quincy Bunch DO  2020  11:22 AM

## 2020-08-14 NOTE — PLAN OF CARE
Data: Jonelle Khan transferred to [facility] via cart at 1230. Baby transferred via parent's arms.  Action: Receiving unit notified of transfer: Yes. Patient and family notified of room change. Report given to Alethea SOW RN at 1230. Belongings sent to receiving unit. Accompanied by Registered Nurse. Oriented patient to surroundings. Call light within reach. ID bands double-checked with receiving RN.  Response: Patient tolerated transfer and is stable.

## 2020-08-15 LAB — HGB BLD-MCNC: 10.8 G/DL (ref 11.7–15.7)

## 2020-08-15 PROCEDURE — 40000084 ZZH STATISTIC IP LACTATION SERVICES 16-30 MIN

## 2020-08-15 PROCEDURE — 12000000 ZZH R&B MED SURG/OB

## 2020-08-15 PROCEDURE — 85018 HEMOGLOBIN: CPT | Performed by: OBSTETRICS & GYNECOLOGY

## 2020-08-15 PROCEDURE — 25000128 H RX IP 250 OP 636: Performed by: OBSTETRICS & GYNECOLOGY

## 2020-08-15 PROCEDURE — 25000132 ZZH RX MED GY IP 250 OP 250 PS 637: Performed by: OBSTETRICS & GYNECOLOGY

## 2020-08-15 PROCEDURE — 36415 COLL VENOUS BLD VENIPUNCTURE: CPT | Performed by: OBSTETRICS & GYNECOLOGY

## 2020-08-15 RX ORDER — LORAZEPAM 0.5 MG/1
0.5 TABLET ORAL EVERY 4 HOURS PRN
Status: DISCONTINUED | OUTPATIENT
Start: 2020-08-15 | End: 2020-08-16 | Stop reason: HOSPADM

## 2020-08-15 RX ADMIN — ACETAMINOPHEN 975 MG: 325 TABLET, FILM COATED ORAL at 09:15

## 2020-08-15 RX ADMIN — OXYCODONE HYDROCHLORIDE 5 MG: 5 TABLET ORAL at 20:50

## 2020-08-15 RX ADMIN — ACETAMINOPHEN 975 MG: 325 TABLET, FILM COATED ORAL at 02:21

## 2020-08-15 RX ADMIN — ACETAMINOPHEN 975 MG: 325 TABLET, FILM COATED ORAL at 14:55

## 2020-08-15 RX ADMIN — ACETAMINOPHEN 975 MG: 325 TABLET, FILM COATED ORAL at 20:49

## 2020-08-15 RX ADMIN — DOCUSATE SODIUM AND SENNOSIDES 2 TABLET: 8.6; 5 TABLET ORAL at 09:15

## 2020-08-15 RX ADMIN — KETOROLAC TROMETHAMINE 30 MG: 30 INJECTION, SOLUTION INTRAMUSCULAR at 05:27

## 2020-08-15 RX ADMIN — DOCUSATE SODIUM AND SENNOSIDES 2 TABLET: 8.6; 5 TABLET ORAL at 20:49

## 2020-08-15 RX ADMIN — SERTRALINE HYDROCHLORIDE 100 MG: 100 TABLET ORAL at 20:50

## 2020-08-15 RX ADMIN — IBUPROFEN 800 MG: 800 TABLET, FILM COATED ORAL at 11:58

## 2020-08-15 RX ADMIN — IBUPROFEN 800 MG: 800 TABLET, FILM COATED ORAL at 18:15

## 2020-08-15 NOTE — PROGRESS NOTES
"OB Post-op  Section Progress Note POD# 1    S:  Patient doing well.  Pain well controlled with oral pain medication.  Ambulating.  Tolerating reg diet.  No N/V.  Passing flatus.  Voiding.  Bleeding is normal.  Breastfeeding.    O:  /65   Pulse 66   Temp 97.7  F (36.5  C) (Oral)   Resp 16   Ht 1.6 m (5' 3\")   Wt 73.5 kg (162 lb)   LMP 11/15/2019 (Approximate)   SpO2 100%   Breastfeeding Unknown   BMI 28.70 kg/m    UOP- Since     Gen- A&O, NAD  Abd- soft, non tender, non distended  Fundus- firm, non tender  Incision/Dressing- C/D/I  Ext- non-tender, no edema.     Hemoglobin   Date Value Ref Range Status   08/15/2020 10.8 (L) 11.7 - 15.7 g/dL Final     A +  Rubella immune    A/P:  34 year old  POD# 1 s/p repeat LTCS and bilateral salpingectomy    1.  Routine post-op cares  2.  Analgesia adequate  3.  Ambulate   4.  Discharge POD#2  5.  The plan of care was discussed with the patient.  She expressed understanding and agreement.   6.  Post operative instructions and indications to call or return were discussed.      Liana Henao MD  8/15/2020  7:03 AM   "

## 2020-08-15 NOTE — PLAN OF CARE
"Patient meeting expected goals. Bonding well with . Infant with a 10.5% weight loss this shift, pumping and supplementing after feedings. Up walking halls this shift. Voiding without difficulty. Using tylenol and ibuprofen for pain. Patient had some tearfulness this shift states\"its just hormones\". Given calming essential oils. Education taught to patient and patient verbalized understanding.  "

## 2020-08-15 NOTE — SAFE
[facility]    Reporting Form For: Possible Maltreatment of a  or Child     Jonelle Khan MRN# [id number]   YOB: 1985 Age: 34 year old   Sex: female Primary Language:English   Address: [address] [place] [address] [place] [address]  Home Phone [phone]       Consult placed for positive THL. Met with patient and , introduced myself and reason for visit. Explained that patient was positive for THC, she reports that she does use it for her anxiety along with Zoloft. Lab test for baby have not come back yet. Patient is bonding well with infant.  is room and supportive. She has a very supportive network of family and friends.     Reported to [facility]    MAX Kennedy   Inpatient Care Coordination   Supervisor  [facility]  [phone]          CARLA Fuentes

## 2020-08-15 NOTE — PLAN OF CARE
Patient doing well, independent with self cares and infant cares.  Incision open to air, appears to be healing well.  Pain well controlled with PO pain medication.   Adonay supportive at bedside.  Will continue to monitor.

## 2020-08-15 NOTE — PLAN OF CARE
Data: Vital signs within normal limits. Postpartum checks within normal limits - see flow record. Patient eating and drinking normally. Patient able to empty bladder independently and is up ambulating, excellent urine output. No apparent signs of infection. Incision edges well approximated, no drainage and healing well. Patient performing self cares and is able to care for infant. Abdominal binder given for comfort with ambulation.   Action: Patient medicated during the shift for pain and cramping. See MAR. Patient reassessed within 1 hour after each medication and pain was improved - patient stated she was comfortable. Patient education done about safe infant sleep, basic infant cares, breastfeeding cues and satiety, stimulating infant to stay awake, breast pumping and finger feeding, self cares, pain management, and the importance of frequent ambulation. See flow record.  Response: Positive attachment behaviors observed with infant. Support person Adonay present and attentive to infant and patient.   Plan: Continue to prepare for discharge.     Patients mobililty level scored using the bedside mobility assistance tool (BMAT). Patient is at a mobility level test number: 4. Mobility equipment used: none required. Up and ambulated in the room and tolerated it well.  Required standby assist of 1 staff members. Further use of BMAT scoring not required.

## 2020-08-16 VITALS
HEART RATE: 64 BPM | BODY MASS INDEX: 28.7 KG/M2 | OXYGEN SATURATION: 100 % | TEMPERATURE: 98.8 F | DIASTOLIC BLOOD PRESSURE: 72 MMHG | WEIGHT: 162 LBS | SYSTOLIC BLOOD PRESSURE: 122 MMHG | RESPIRATION RATE: 18 BRPM | HEIGHT: 63 IN

## 2020-08-16 PROCEDURE — 25000132 ZZH RX MED GY IP 250 OP 250 PS 637: Performed by: OBSTETRICS & GYNECOLOGY

## 2020-08-16 RX ORDER — ACETAMINOPHEN 325 MG/1
650 TABLET ORAL EVERY 6 HOURS
COMMUNITY
Start: 2020-08-16 | End: 2021-02-19

## 2020-08-16 RX ORDER — AMOXICILLIN 250 MG
1 CAPSULE ORAL 2 TIMES DAILY PRN
COMMUNITY
Start: 2020-08-16 | End: 2020-09-29

## 2020-08-16 RX ORDER — OXYCODONE HYDROCHLORIDE 5 MG/1
5 TABLET ORAL EVERY 4 HOURS PRN
Qty: 10 TABLET | Refills: 0 | Status: SHIPPED | OUTPATIENT
Start: 2020-08-16 | End: 2020-09-29

## 2020-08-16 RX ORDER — HYDROXYZINE HYDROCHLORIDE 10 MG/1
10 TABLET, FILM COATED ORAL EVERY 8 HOURS PRN
Qty: 15 TABLET | Refills: 0 | Status: SHIPPED | OUTPATIENT
Start: 2020-08-16 | End: 2020-09-29

## 2020-08-16 RX ORDER — IBUPROFEN 600 MG/1
600 TABLET, FILM COATED ORAL EVERY 6 HOURS PRN
Qty: 60 TABLET | Refills: 0 | Status: SHIPPED | OUTPATIENT
Start: 2020-08-16 | End: 2021-02-19

## 2020-08-16 RX ADMIN — ACETAMINOPHEN 975 MG: 325 TABLET, FILM COATED ORAL at 04:26

## 2020-08-16 RX ADMIN — IBUPROFEN 800 MG: 800 TABLET, FILM COATED ORAL at 06:37

## 2020-08-16 RX ADMIN — ACETAMINOPHEN 975 MG: 325 TABLET, FILM COATED ORAL at 10:41

## 2020-08-16 RX ADMIN — OXYCODONE HYDROCHLORIDE 5 MG: 5 TABLET ORAL at 10:41

## 2020-08-16 RX ADMIN — IBUPROFEN 800 MG: 800 TABLET, FILM COATED ORAL at 00:16

## 2020-08-16 NOTE — PROVIDER NOTIFICATION
Dr. Delgado called to update on patient's increased anxiety and that she was extremely tearful. Could we get something to calm her nerves and/or help her sleep? Verbal order for Ativan 0.5 mg PRN every 6 hours. Order read back. Continue to have SW consult with patient for social concerns.

## 2020-08-16 NOTE — DISCHARGE INSTRUCTIONS
Postop  Birth Instructions    Follow up in clinic in 2 weeks and 6 weeks  [facility]: [phone]  Lactation: [phone]    Activity       Do not lift more than 10 pounds for 6 weeks after surgery.  Ask family and friends for help when you need it.    No driving until you have stopped taking your pain medications (usually two weeks after surgery).    No heavy exercise or activity for 6 weeks.  Don't do anything that will put a strain on your surgery site.    Don't strain when using the toilet.  Your care team may prescribe a stool softener if you have problems with your bowel movements.     To care for your incision:       Keep the incision clean and dry.    Do not soak your incision in water. No swimming or hot tubs until it has fully healed. You may soak in the bathtub if the water level is below your incision.    Do not use peroxide, gel, cream, lotion, or ointment on your incision.    Adjust your clothes to avoid pressure on your surgery site (check the elastic in your underwear for example).     You may see a small amount of clear or pink drainage and this is normal.  Check with your health care provider:       If the drainage increases or has an odor.    If the incision reddens, you have swelling, or develop a rash.    If you have increased pain and the medicine we prescribed doesn't help.    If you have a fever above 100.4 F (38 C) with or without chills when placing thermometer under your tongue.   The area around your incision (surgery wound), will feel numb.  This is normal. The numbness should go away in less than a year.     Keep your hands clean:  Always wash your hands before touching your incision (surgery wound). This helps reduce your risk of infection. If your hands aren't dirty, you may use an alcohol hand-rub to clean your hands. Keep your nails clean and short.    Call your healthcare provider if you have any of these symptoms:       You soak a sanitary pad with blood within 1  hour, or you see blood clots larger than a golf ball.    Bleeding that lasts more than 6 weeks.    Vaginal discharge that smells bad.    Severe pain, cramping or tenderness in your lower belly area.    A need to urinate more frequently (use the toilet more often), more urgently (use the toilet very quickly), or it burns when you urinate.    Nausea and vomiting.    Redness, swelling or pain around a vein in your leg.    Problems breastfeeding or a red or painful area on your breast.    Chest pain and cough or are gasping for air.    Problems with coping with sadness, anxiety or depression. If you have concerns about hurting yourself or the baby, call your provider immediately.      You have questions or concerns after you return home.

## 2020-08-16 NOTE — PLAN OF CARE
Data: Vital signs within normal limits. Postpartum checks within normal limits - see flow record. Patient eating and drinking normally. Patient able to empty bladder independently and is up ambulating. No apparent signs of infection. Incision healing well. Patient performing self cares and is able to care for infant.  Action: Patient medicated during the shift for pain and cramping. See MAR. Patient reassessed within 1 hour after each medication and pain was improved - patient stated she was comfortable. Patient education done about discharge instructions, take home medications, post partum depression, and follow up appointments. Patient has a history of anxiety/depression and is going home with a prescription for atarax to use as needed. Patient is aware of signs/symptoms to watch for for post partum mood disorders and to contact her doctor prior to her follow up appointments if needed.  Response: Positive attachment behaviors observed with infant. Support persons present.   Plan: Anticipate discharge home with infant.

## 2020-08-16 NOTE — PLAN OF CARE
"Data: Vital signs within normal limits. Postpartum checks within normal limits, see flow record. Patient eating and drinking normally. Patient able to empty bladder independently and is up ambulating. No apparent signs of infection. Incision healing well, clean dry and intact, edges well approximated.  Patient performing self cares and is able to care for infant. Patient did have an episode of crying and stated that she \"felt overwhelmed, fearful, unable to sleep, and her thoughts were racing\".  Action: Patient medicated during the shift for pain and cramping. See MAR. Patient reassessed within 1 hour after each medication and pain was improved - patient stated she was comfortable. Calming aromatherapy and guided imagery utilized to help calm her down. MD paged for PRN Ativan to help with anxiety and sleep.  Patient education done about safe infant sleep, breastfeeding, finger feeding with formula supplementing, self cares, anxiety and stress reduction methods. See flow record.  Response: Patient was able to get some rest and \"feels much better\" and reports \"relief and feeling more like herself\". Positive attachment behaviors observed with infant. Support persons Adonay present and attentive to patient and infant.   Plan: Continue to monitor and prepare for discharge. Anticipate discharge on 08/16/20.  "

## 2020-08-16 NOTE — PROGRESS NOTES
"OB Post-op  Section Progress Note POD# 2    S:  Patient doing well.  Pain well controlled with oral pain medication.  Ambulating.  Tolerating reg diet.  No N/V.  Passing flatus.  Voiding.  Bleeding is normal.  Breastfeeding. Increased anxiety overnight - would like a prn med to go home with.    O:  /72   Pulse 64   Temp 98.8  F (37.1  C) (Oral)   Resp 18   Ht 1.6 m (5' 3\")   Wt 73.5 kg (162 lb)   LMP 11/15/2019 (Approximate)   SpO2 100%   Breastfeeding Unknown   BMI 28.70 kg/m    UOP- Since     Gen- A&O, NAD  Abd- soft, non tender, non distended  Fundus- firm, non tender  Incision/Dressing- C/D/I  Ext- non-tender, no edema.     Hemoglobin   Date Value Ref Range Status   08/15/2020 10.8 (L) 11.7 - 15.7 g/dL Final     A +  Rubella immune    A/P:  34 year old  POD# 2 s/p repeat LTCS and bilateral salpingectomy    1.  Routine post-op cares  2.  Acute blood loss anemia - asymptomatic, continue PNV on discharge  3.  Anxiety/Depression/Bipolar - Continue Zoloft, f/u in 1-2 weeks for mood check. Rx given for hydroxyzine prn.  4.  Discharge POD#2. Rx given for #10 oxycodone & ibuprofen  5.  The plan of care was discussed with the patient.  She expressed understanding and agreement.   6.  Post operative instructions and indications to call or return were discussed.      Liana Henao MD  8/15/2020  7:03 AM   "

## 2020-08-16 NOTE — LACTATION NOTE
Lactation in to see patient. Baby fussy after nursing. Parents wanting to use formula to supplement. Mother states baby latches and nurses well, is still hungry after feeds. Plan to finger fed formula after each breastfeeding if baby still seems hungry. Jonelle to pump. All questions answered at this time. Encouraged to call for assistance.

## 2020-08-16 NOTE — PROGRESS NOTES
NAYE  D: ED received page from [place] [employer] (for [facility] Child [facility])  responding to a positive tox screen from a labor and delivery patient.  Day staff is gone for the day, so evening NAYE was paged.  I: NAYE returned call to Gianna at [facility] [phone].  Gianna states that they received a phone in report about a positive toxicology screen for mom 2 days ago, but did not receive a written report and needed more information from NAYE so CPS would know whether they needed to assign the case immediately or whether they had more time.  NAYE reviewed chart and answered questions on mother's toxicology screen and nurse notes that commented that pt was bonding well with baby and admitted to using marijuana for coping, along with prescription meds.  NAYE informed CPS that pt and baby discharged already from [facility].    P: NAYE can be re-paged if further needs arise.

## 2020-08-17 LAB — COPATH REPORT: NORMAL

## 2020-08-17 NOTE — PROGRESS NOTES
D) NAYE responding to VM from after hours NAYE M.H.   NAYE faxed baby's toxicology report to [facility] CPS.  P) No further needs.  Orestes AGUIRRE Case Management  Inpatient   Maternal Child and ED  [facility]    [phone]

## 2020-08-19 LAB
CANNABINOIDS UR CFM-MCNC: 1791 NG/ML
CARBOXYTHC/CREAT UR: 1674 NG/MG{CREAT}

## 2020-08-31 NOTE — DISCHARGE SUMMARY
[facility] Discharge Summary    Jonelle Khan MRN# [id number]   Age: 34 year old YOB: 1985     Date of Admission:  2020  Date of Discharge:  2020  Admitting Physician:  Octavia Maya MD  Discharge Physician:  Liana Henao MD    Admit Dx:   - Intrauterine pregnancy at 39w0d   - prior  section  - desires permanent sterilization  - history of asthma  - history of anxiety/depression/bipolar disorder     Discharge Dx:  - Same as above, s/p repeat low transverse  section and bilateral salpingectomy     Procedures:  - repeat low transverse  section with 2 layer closure via Pfannenstiel incision  - bilateral salpingectomy     Admit HPI:  Ms. Jonelle Khan is a 34 year old  at 39w0d who was admitted on 2020 for repeat  section with bilateral salpingectomy for permanent sterilization.  Please see her admit H&P for full details of her PMH, PSH, Meds, Allergies and exam on admit.    Hospital course:  After discussion of risk and and benefits and signing informed consent the patient was taken to the operating room for repeat  section.    EBL from the delivery was 105ml. Please see her  Section Operative Note for full details regarding her delivery.    Her postoperative course was complicated with increase in anxiety. She was continued on zoloft and started on hydroxyzine prn. On POD#2, she was meeting all of her postpartum goals and deemed stable for discharge. She was voiding without difficulty, tolerating a regular diet without nausea and vomiting, her pain was well controlled on oral pain medicines and her lochia was appropriate. Her hemoglobin prior to delivery was 12.6 and after delivery was 10.8. Her Rh status was positive and Rhogam was not indicated.      Discharge Medications:  Discharge Medication List as of 2020  9:44 AM      START taking these medications    Details   acetaminophen (TYLENOL) 325 MG tablet  Take 2 tablets (650 mg) by mouth every 6 hours, OTC      hydrOXYzine (ATARAX) 10 MG tablet Take 1 tablet (10 mg) by mouth every 8 hours as needed for anxiety, Disp-15 tablet,R-0, E-Prescribe      ibuprofen (ADVIL/MOTRIN) 600 MG tablet Take 1 tablet (600 mg) by mouth every 6 hours as needed for moderate pain, Disp-60 tablet,R-0, E-Prescribe      oxyCODONE (ROXICODONE) 5 MG tablet Take 1 tablet (5 mg) by mouth every 4 hours as needed for severe pain, Disp-10 tablet,R-0, Local Print      senna-docusate (SENOKOT-S/PERICOLACE) 8.6-50 MG tablet Take 1 tablet by mouth 2 times daily as needed for constipation, OTC         CONTINUE these medications which have NOT CHANGED    Details   calcium carbonate (TUMS) 500 MG chewable tablet Take 1 chew tab by mouth 2 times daily, Historical      Famotidine 20 MG CHEW Take by mouth At Bedtime, Historical      Prenatal Vit-Fe Fumarate-FA (PRENATAL COMPLETE) 14-0.4 MG TABS Historical      sertraline (ZOLOFT) 100 MG tablet Take 100 mg by mouth daily, Historical               Discharge/Disposition:  Jonelle Khan was discharged to home in stable condition with the following instructions/medications:  1) Call for temperature > 100.4, bright red vaginal bleeding >1 pad an hour x 2 hours, foul smelling vaginal discharge, pain not controlled by usual oral pain meds, persistent nausea and vomiting not controlled on medications, drainage or redness from incision site  2) For feeding she decided to breast feed.  3) She was instructed to follow-up with her primary OB in 6 weeks for a routine postpartum visit and in 1-2 weeks for mood check.  4) Discharge activity:  No heavy lifting >15 lbs or strenuous activity for 6 weeks, pelvic rest for 6 weeks, no driving or operating machinery while on narcotics.      MD Jesica Leal OB/GYN  8/31/2020 3:25 PM

## 2020-09-22 DIAGNOSIS — F41.9 ANXIETY: Primary | ICD-10-CM

## 2020-09-22 RX ORDER — SERTRALINE HYDROCHLORIDE 100 MG/1
TABLET, FILM COATED ORAL
Qty: 135 TABLET | Refills: 1 | OUTPATIENT
Start: 2020-09-22

## 2020-09-22 NOTE — TELEPHONE ENCOUNTER
Routing refill request to provider for review/approval because:  Drug interaction warning  Medication is reported/historical (appears last ordered in hospital after delivery.     Drug-Drug: propranolol and albuterol    Kia Silva RN Flex

## 2020-09-22 NOTE — TELEPHONE ENCOUNTER
Please let Jonelle know that she is due for a clinic visit, she needs to be seen in clinic at least every year, last visit in clinic 2/11/2019. The prescription has been declined.  Thanks,  Susan Haase, CNP

## 2020-09-28 NOTE — TELEPHONE ENCOUNTER
Swapna- Sertraline is historical on med list.  See message below.  Please advise.  Ifrah Marino RN

## 2020-09-28 NOTE — TELEPHONE ENCOUNTER
Pt called -has appointment for tomorrow 09/29/2020 with Susan Haase. Pt is out of Sertraline and needs one pill for today please.    Idalmis Yeh/JOHANN

## 2020-09-29 ENCOUNTER — VIRTUAL VISIT (OUTPATIENT)
Dept: FAMILY MEDICINE | Facility: CLINIC | Age: 35
End: 2020-09-29
Payer: COMMERCIAL

## 2020-09-29 DIAGNOSIS — F41.9 ANXIETY: Primary | ICD-10-CM

## 2020-09-29 DIAGNOSIS — F31.81 BIPOLAR 2 DISORDER (H): ICD-10-CM

## 2020-09-29 PROCEDURE — 99213 OFFICE O/P EST LOW 20 MIN: CPT | Mod: 95 | Performed by: NURSE PRACTITIONER

## 2020-09-29 RX ORDER — SERTRALINE HYDROCHLORIDE 100 MG/1
150 TABLET, FILM COATED ORAL DAILY
Qty: 135 TABLET | Refills: 1 | Status: SHIPPED | OUTPATIENT
Start: 2020-09-29 | End: 2021-07-02

## 2020-09-29 ASSESSMENT — PATIENT HEALTH QUESTIONNAIRE - PHQ9
5. POOR APPETITE OR OVEREATING: SEVERAL DAYS
SUM OF ALL RESPONSES TO PHQ QUESTIONS 1-9: 5

## 2020-09-29 ASSESSMENT — ANXIETY QUESTIONNAIRES
1. FEELING NERVOUS, ANXIOUS, OR ON EDGE: SEVERAL DAYS
6. BECOMING EASILY ANNOYED OR IRRITABLE: SEVERAL DAYS
2. NOT BEING ABLE TO STOP OR CONTROL WORRYING: NOT AT ALL
3. WORRYING TOO MUCH ABOUT DIFFERENT THINGS: SEVERAL DAYS
IF YOU CHECKED OFF ANY PROBLEMS ON THIS QUESTIONNAIRE, HOW DIFFICULT HAVE THESE PROBLEMS MADE IT FOR YOU TO DO YOUR WORK, TAKE CARE OF THINGS AT HOME, OR GET ALONG WITH OTHER PEOPLE: SOMEWHAT DIFFICULT
5. BEING SO RESTLESS THAT IT IS HARD TO SIT STILL: SEVERAL DAYS
GAD7 TOTAL SCORE: 5
7. FEELING AFRAID AS IF SOMETHING AWFUL MIGHT HAPPEN: NOT AT ALL

## 2020-09-29 NOTE — PROGRESS NOTES
"Jonelle Khan is a 35 year old female who is being evaluated via a billable video visit.      The patient has been notified of following:     \"This video visit will be conducted via a call between you and your physician/provider. We have found that certain health care needs can be provided without the need for an in-person physical exam.  This service lets us provide the care you need with a video conversation.  If a prescription is necessary we can send it directly to your pharmacy.  If lab work is needed we can place an order for that and you can then stop by our lab to have the test done at a later time.    Video visits are billed at different rates depending on your insurance coverage.  Please reach out to your insurance provider with any questions.    If during the course of the call the physician/provider feels a video visit is not appropriate, you will not be charged for this service.\"    Patient has given verbal consent for Video visit? Yes  How would you like to obtain your AVS? MyChart  If you are dropped from the video visit, the video invite should be resent to: Text to cell phone: [phone]  Will anyone else be joining your video visit? No  Subjective     Jonelle Khan is a 35 year old female who presents today via video visit for the following health issues:    HPI    Depression and Anxiety Follow-Up    How are you doing with your depression since your last visit? Improved     How are you doing with your anxiety since your last visit?  No change    Are you having other symptoms that might be associated with depression or anxiety? No    Have you had a significant life event? No     Do you have any concerns with your use of alcohol or other drugs? No   Taking sertraline 100 mg daily.  Feeling more agitation or restless.  Is not in counseling at this time.  Denies thoughts of harming self or others. Has a 2 month old baby, doing well with adjusting. Took sertraline 100 mg throughout her " pregnancy.     Social History     Tobacco Use     Smoking status: Former Smoker     Types: Vaping Device     Smokeless tobacco: Never Used     Tobacco comment: occasional   Substance Use Topics     Alcohol use: Not Currently     Alcohol/week: 1.0 standard drinks     Types: 1 Cans of beer per week     Drug use: Yes     Types: Marijuana     Comment: once per day through duration of pregnancy     PHQ 2/11/2019 5/8/2019 1/23/2020   PHQ-9 Total Score 0 5 4   Q9: Thoughts of better off dead/self-harm past 2 weeks Not at all Not at all Not at all     LUIS FELIPE-7 SCORE 2/11/2019 5/8/2019 1/23/2020   Total Score - - -   Total Score 2 (minimal anxiety) - -   Total Score 2 12 6     Last PHQ-9 9/29/2020   1.  Little interest or pleasure in doing things 0   2.  Feeling down, depressed, or hopeless 0   3.  Trouble falling or staying asleep, or sleeping too much 0   4.  Feeling tired or having little energy 3   5.  Poor appetite or overeating 0   6.  Feeling bad about yourself 0   7.  Trouble concentrating 1   8.  Moving slowly or restless 1   Q9: Thoughts of better off dead/self-harm past 2 weeks 0   PHQ-9 Total Score 5   Difficulty at work, home, or with people Somewhat difficult     LUIS FELIPE-7  9/29/2020   1. Feeling nervous, anxious, or on edge 1   2. Not being able to stop or control worrying 0   3. Worrying too much about different things 1   4. Trouble relaxing 1   5. Being so restless that it is hard to sit still 1   6. Becoming easily annoyed or irritable 1   7. Feeling afraid, as if something awful might happen 0   LUIS FELIPE-7 Total Score 5   If you checked any problems, how difficult have they made it for you to do your work, take care of things at home, or get along with other people? Somewhat difficult       Suicide Assessment Five-step Evaluation and Treatment (SAFE-T)      How many servings of fruits and vegetables do you eat daily?  2-3    On average, how many sweetened beverages do you drink each day (Examples: soda, juice, sweet  "tea, etc.  Do NOT count diet or artificially sweetened beverages)?   1    How many days per week do you exercise enough to make your heart beat faster? 3 or less    How many minutes a day do you exercise enough to make your heart beat faster? 9 or less    How many days per week do you miss taking your medication? 0        Video Start Time: 1317    Review of Systems   CONSTITUTIONAL: NEGATIVE for fever, chills, change in weight  RESP: NEGATIVE for significant cough or SOB  CV: NEGATIVE for chest pain, palpitations or peripheral edema  PSYCHIATRIC: see HPI      Objective           Vitals:  No vitals were obtained today due to virtual visit.    Physical Exam     GENERAL: Healthy, alert and no distress  RESP: No audible wheeze, cough, or visible cyanosis.  No visible retractions or increased work of breathing.    NEURO: Cranial nerves grossly intact.  Mentation and speech appropriate for age.  PSYCH: Mentation appears normal, affect normal/bright, judgement and insight intact, normal speech and appearance well-groomed.              Assessment & Plan     Anxiety: PHQ 9 of 5, LUIS FELIPE 7 of 5, denies thoughts of harming self or others. Will refill sertraline, increase dose to 150 mg daily.   - sertraline (ZOLOFT) 100 MG tablet  Dispense: 135 tablet; Refill: 1    Bipolar 2 disorder (H):  Asymptomatic at this time, declines seeing a counselor.        BMI:   Estimated body mass index is 28.7 kg/m  as calculated from the following:    Height as of 8/14/20: 1.6 m (5' 3\").    Weight as of 8/14/20: 73.5 kg (162 lb).     Follow up in 6 months for physical exam in clinic.   Susan Haase, APRN CNP  [facility]      Video-Visit Details    Type of service:  Video Visit    Video End Time:1330    Originating Location (pt. Location): Home    Distant Location (provider location):  [facility]     Platform used for Video Visit: Mary Lou          "

## 2020-09-30 ASSESSMENT — ANXIETY QUESTIONNAIRES: GAD7 TOTAL SCORE: 5

## 2020-10-09 ASSESSMENT — ASTHMA QUESTIONNAIRES: ACT_TOTALSCORE: 24

## 2021-01-09 ENCOUNTER — HEALTH MAINTENANCE LETTER (OUTPATIENT)
Age: 36
End: 2021-01-09

## 2021-02-13 ENCOUNTER — MYC MEDICAL ADVICE (OUTPATIENT)
Dept: FAMILY MEDICINE | Facility: CLINIC | Age: 36
End: 2021-02-13

## 2021-02-13 ENCOUNTER — OFFICE VISIT (OUTPATIENT)
Dept: URGENT CARE | Facility: URGENT CARE | Age: 36
End: 2021-02-13
Payer: COMMERCIAL

## 2021-02-13 ENCOUNTER — HOSPITAL ENCOUNTER (EMERGENCY)
Facility: CLINIC | Age: 36
Discharge: HOME | End: 2021-02-13
Attending: EMERGENCY MEDICINE | Admitting: EMERGENCY MEDICINE
Payer: COMMERCIAL

## 2021-02-13 ENCOUNTER — APPOINTMENT (OUTPATIENT)
Dept: GENERAL RADIOLOGY | Facility: CLINIC | Age: 36
End: 2021-02-13
Attending: EMERGENCY MEDICINE
Payer: COMMERCIAL

## 2021-02-13 VITALS
OXYGEN SATURATION: 100 % | TEMPERATURE: 97.8 F | HEART RATE: 108 BPM | DIASTOLIC BLOOD PRESSURE: 96 MMHG | RESPIRATION RATE: 17 BRPM | SYSTOLIC BLOOD PRESSURE: 158 MMHG

## 2021-02-13 VITALS
HEART RATE: 114 BPM | BODY MASS INDEX: 28.34 KG/M2 | RESPIRATION RATE: 22 BRPM | WEIGHT: 160 LBS | OXYGEN SATURATION: 99 % | TEMPERATURE: 99.3 F

## 2021-02-13 DIAGNOSIS — J18.9 PNEUMONIA OF LEFT LOWER LOBE DUE TO INFECTIOUS ORGANISM: ICD-10-CM

## 2021-02-13 DIAGNOSIS — J45.21 MILD INTERMITTENT ASTHMA WITH EXACERBATION: ICD-10-CM

## 2021-02-13 DIAGNOSIS — R06.00 DYSPNEA, UNSPECIFIED TYPE: Primary | ICD-10-CM

## 2021-02-13 LAB
ALBUMIN SERPL-MCNC: 4.7 G/DL (ref 3.4–5)
ALP SERPL-CCNC: 58 U/L (ref 40–150)
ALT SERPL W P-5'-P-CCNC: 20 U/L (ref 0–50)
ANION GAP SERPL CALCULATED.3IONS-SCNC: 8 MMOL/L (ref 3–14)
AST SERPL W P-5'-P-CCNC: 23 U/L (ref 0–45)
B-HCG FREE SERPL-ACNC: <5 IU/L
BASOPHILS # BLD AUTO: 0.1 10E9/L (ref 0–0.2)
BASOPHILS NFR BLD AUTO: 0.9 %
BILIRUB SERPL-MCNC: 0.5 MG/DL (ref 0.2–1.3)
BUN SERPL-MCNC: 9 MG/DL (ref 7–30)
CA-I BLD-MCNC: 4.5 MG/DL (ref 4.4–5.2)
CALCIUM SERPL-MCNC: 9.8 MG/DL (ref 8.5–10.1)
CHLORIDE SERPL-SCNC: 106 MMOL/L (ref 94–109)
CO2 SERPL-SCNC: 25 MMOL/L (ref 20–32)
CREAT SERPL-MCNC: 0.64 MG/DL (ref 0.52–1.04)
D DIMER PPP FEU-MCNC: 0.5 UG/ML FEU (ref 0–0.5)
DIFFERENTIAL METHOD BLD: ABNORMAL
EOSINOPHIL # BLD AUTO: 0.1 10E9/L (ref 0–0.7)
EOSINOPHIL NFR BLD AUTO: 1.1 %
ERYTHROCYTE [DISTWIDTH] IN BLOOD BY AUTOMATED COUNT: 12.4 % (ref 10–15)
FLUAV RNA RESP QL NAA+PROBE: NEGATIVE
FLUBV RNA RESP QL NAA+PROBE: NEGATIVE
GFR SERPL CREATININE-BSD FRML MDRD: >90 ML/MIN/{1.73_M2}
GLUCOSE SERPL-MCNC: 88 MG/DL (ref 70–99)
HCT VFR BLD AUTO: 42.6 % (ref 35–47)
HGB BLD-MCNC: 14.2 G/DL (ref 11.7–15.7)
IMM GRANULOCYTES # BLD: 0 10E9/L (ref 0–0.4)
IMM GRANULOCYTES NFR BLD: 0.3 %
LABORATORY COMMENT REPORT: NORMAL
LYMPHOCYTES # BLD AUTO: 3.5 10E9/L (ref 0.8–5.3)
LYMPHOCYTES NFR BLD AUTO: 28.1 %
MCH RBC QN AUTO: 29.6 PG (ref 26.5–33)
MCHC RBC AUTO-ENTMCNC: 33.3 G/DL (ref 31.5–36.5)
MCV RBC AUTO: 89 FL (ref 78–100)
MONOCYTES # BLD AUTO: 0.9 10E9/L (ref 0–1.3)
MONOCYTES NFR BLD AUTO: 7 %
NEUTROPHILS # BLD AUTO: 7.8 10E9/L (ref 1.6–8.3)
NEUTROPHILS NFR BLD AUTO: 62.6 %
NRBC # BLD AUTO: 0 10*3/UL
NRBC BLD AUTO-RTO: 0 /100
PLATELET # BLD AUTO: 273 10E9/L (ref 150–450)
POTASSIUM SERPL-SCNC: 3.3 MMOL/L (ref 3.4–5.3)
PROT SERPL-MCNC: 8.4 G/DL (ref 6.8–8.8)
RBC # BLD AUTO: 4.79 10E12/L (ref 3.8–5.2)
RSV RNA SPEC QL NAA+PROBE: NORMAL
SARS-COV-2 RNA RESP QL NAA+PROBE: NEGATIVE
SODIUM SERPL-SCNC: 139 MMOL/L (ref 133–144)
SPECIMEN SOURCE: NORMAL
WBC # BLD AUTO: 12.4 10E9/L (ref 4–11)

## 2021-02-13 PROCEDURE — 250N000011 HC RX IP 250 OP 636: Performed by: EMERGENCY MEDICINE

## 2021-02-13 PROCEDURE — 96374 THER/PROPH/DIAG INJ IV PUSH: CPT

## 2021-02-13 PROCEDURE — 82330 ASSAY OF CALCIUM: CPT | Performed by: EMERGENCY MEDICINE

## 2021-02-13 PROCEDURE — 84702 CHORIONIC GONADOTROPIN TEST: CPT

## 2021-02-13 PROCEDURE — 99207 PR NON-BILLABLE SERV PER CHARTING: CPT

## 2021-02-13 PROCEDURE — C9803 HOPD COVID-19 SPEC COLLECT: HCPCS

## 2021-02-13 PROCEDURE — 71045 X-RAY EXAM CHEST 1 VIEW: CPT

## 2021-02-13 PROCEDURE — 85379 FIBRIN DEGRADATION QUANT: CPT | Performed by: EMERGENCY MEDICINE

## 2021-02-13 PROCEDURE — 85025 COMPLETE CBC W/AUTO DIFF WBC: CPT | Performed by: EMERGENCY MEDICINE

## 2021-02-13 PROCEDURE — 258N000003 HC RX IP 258 OP 636: Performed by: EMERGENCY MEDICINE

## 2021-02-13 PROCEDURE — 99285 EMERGENCY DEPT VISIT HI MDM: CPT | Mod: 25

## 2021-02-13 PROCEDURE — 80053 COMPREHEN METABOLIC PANEL: CPT | Performed by: EMERGENCY MEDICINE

## 2021-02-13 PROCEDURE — 93005 ELECTROCARDIOGRAM TRACING: CPT

## 2021-02-13 PROCEDURE — 96361 HYDRATE IV INFUSION ADD-ON: CPT

## 2021-02-13 PROCEDURE — 87636 SARSCOV2 & INF A&B AMP PRB: CPT | Performed by: EMERGENCY MEDICINE

## 2021-02-13 RX ORDER — LORAZEPAM 2 MG/ML
0.5 INJECTION INTRAMUSCULAR ONCE
Status: COMPLETED | OUTPATIENT
Start: 2021-02-13 | End: 2021-02-13

## 2021-02-13 RX ORDER — AZITHROMYCIN 250 MG/1
TABLET, FILM COATED ORAL
Qty: 6 TABLET | Refills: 0 | Status: SHIPPED | OUTPATIENT
Start: 2021-02-13 | End: 2021-02-18

## 2021-02-13 RX ORDER — DEXAMETHASONE 2 MG/1
10 TABLET ORAL ONCE
Qty: 5 TABLET | Refills: 0 | Status: SHIPPED | OUTPATIENT
Start: 2021-02-13 | End: 2021-02-19

## 2021-02-13 RX ADMIN — LORAZEPAM 0.5 MG: 2 INJECTION INTRAMUSCULAR; INTRAVENOUS at 17:12

## 2021-02-13 RX ADMIN — SODIUM CHLORIDE 1000 ML: 9 INJECTION, SOLUTION INTRAVENOUS at 16:29

## 2021-02-13 RX ADMIN — LORAZEPAM 0.5 MG: 2 INJECTION INTRAMUSCULAR; INTRAVENOUS at 16:45

## 2021-02-13 ASSESSMENT — ENCOUNTER SYMPTOMS
MYALGIAS: 1
NERVOUS/ANXIOUS: 1
SHORTNESS OF BREATH: 1

## 2021-02-13 NOTE — ED PROVIDER NOTES
History   Chief Complaint:  Multiple Complaints       The history is provided by the patient.      Jonelle Khan is a 35 year old female with history of asthma and anxiety who presents with SOB and bilateral hand tingling. Patient reports both arms hurt with associated tingling in her hands. She mentions using Biofreeze and braces to help with her symptoms. Additionally, she states anxiety began today. She is having difficulty breathing, she currently is using Albuterol and a nebulizer twice a day. Denies  drugs or Alcohol use, mood changes, or leg swelling. Been around her  (positive around [place])  and Grandma who both had COVID. She was tested for COVID when she was in the hospital having her baby.     Review of Systems   Respiratory: Positive for shortness of breath.    Cardiovascular: Negative for leg swelling.   Musculoskeletal: Positive for myalgias.   Neurological:        Arm tingling   Psychiatric/Behavioral: The patient is nervous/anxious.         No mood changes.   All other systems reviewed and are negative.      Allergies:  Ceclor [Cefaclor]    Medications:  Zoloft   Albuterol   Nebulizer    Past Medical History:    Anxiety   Asthma   Depressive disorder    Kidney infection   Sleep apnea   Vitamin D deficiency   Bipolar 2 disorder    Allergic rhinitis     Past Surgical History:     section  x3  Salpingectomy bilateral    Laparoscopic cholecystectomy   Chalazion removal     Family History:    Mother: gall bladder disease   Father: asthma, gastrointestinal disease   Brother: asthma   Sister: depression , anxiety Disorder      Social History:  Currently nursing 6 month old baby.   Presents alone.      Physical Exam     Patient Vitals for the past 24 hrs:   BP Temp Temp src Pulse Resp SpO2   21 1820 -- -- -- 81 9 100 %   21 1815 (!) 160/95 -- -- 91 9 100 %   21 1810 -- -- -- 90 11 100 %   21 1805 -- -- -- 90 -- 100 %   21 1800 (!) 149/95 -- -- 85 9  100 %   02/13/21 1755 -- -- -- 93 11 100 %   02/13/21 1750 -- -- -- 96 18 100 %   02/13/21 1745 (!) 161/93 -- -- 90 13 100 %   02/13/21 1740 -- -- -- 93 26 100 %   02/13/21 1735 -- -- -- 89 -- 97 %   02/13/21 1730 (!) 151/95 -- -- 77 10 100 %   02/13/21 1720 -- -- -- 92 13 100 %   02/13/21 1715 (!) 153/75 -- -- 87 12 100 %   02/13/21 1705 (!) 149/91 -- -- 77 -- 100 %   02/13/21 1646 -- -- -- 101 22 100 %   02/13/21 1645 -- -- -- 109 27 100 %   02/13/21 1644 (!) 153/104 -- -- 93 (!) 6 100 %   02/13/21 1643 -- -- -- 81 9 100 %   02/13/21 1642 -- -- -- 82 9 99 %   02/13/21 1640 -- -- -- 94 8 99 %   02/13/21 1639 -- -- -- 96 9 100 %   02/13/21 1638 -- -- -- 93 17 100 %   02/13/21 1615 (!) 203/127 -- -- -- -- --   02/13/21 1614 -- 97.8  F (36.6  C) Temporal 129 30 99 %       Physical Exam  Constitutional: Anxious appearing. Pressured speech. Tearful   HENT:    Nose: Nose normal.    Mouth/Throat: Oropharynx is clear, mucous membranes are moist  Eyes: EOM are normal, anicteric, conjugate gaze  CV: Tachycardic    Chest: Lungs cleared. Tachypneic.   GI:  non tender. No distension. No guarding or rebound.    MSK: No LE edema, no tenderness to palpation of BLE.  Neurological: Bilateral Carpal Tunnel spasms.   strength 5 out of 5.  Skin: Skin is warm and dry.      Emergency Department Course     ECG:  ECG taken at 1625  Normal sinus rhythm with sinus arrhythmia   Normal  ECG  No significant change when compared to EKG dated 9/14/15.  Rate 87 bpm. AK interval 130 ms. QRS duration 78 ms. QT/QTc 376/452 ms. P-R-T axes 50 79 49.    Imaging:  XR Chest Port 1 View  IMPRESSION: Heart size is normal. Minimal streaky opacities at the left lung base could reflect atelectasis or early infiltrate. Lungs otherwise clear. No visible pneumothorax or pleural effusion.  Reading per radiology    Laboratory:  CBC: WBC 12.4, HGB 14.2,    CMP: Potassium 3.3(L), Creatinine 0.64 o/w WNL  Symptomatic Influenza A/B & SARS-CoV2 (COVID19)  Virus PCR Multiplex all Negative    D dimer qualitative: 0.5  ISTAT HCG Quantitative Pregnancy POCT: <5.0  Calcium ionized whole blood: 4.5    Emergency Department Course:    Reviewed:  I reviewed nursing notes, vitals and past medical history    Assessments:   I obtained history and examined the patient as noted above.   183 I rechecked the patient and explained findings.     Interventions:  1629  NS, 1 L, IV   1645  Ativan 0.5mg Oral   1712 Ativan 0.5mg Oral     Disposition:  The patient was discharged to home.     Impression & Plan     Medical Decision Makin-year-old woman presenting for evaluation of several days of increasing shortness of breath and hand tingling associated with carpopedal spasms.  She has no overt infectious symptoms, D-dimer is negative with low suspicion for PE, stricture shows a question of left lower lobe pneumonia, she does have a white count and given her shortness of breath, I do feel it is reasonable to treat her.  We will start her on azithromycin she is breast-feeding.  She was overtly anxious here on arrival and had carpopedal spasms with blood pressure cuff testing however total and ionized calcium levels were within normal limits.  She complains of a history of carpal tunnel syndrome, however she has good  strength and I have low suspicion for CVA.  I recommended orthopedic follow-up, elevation, ice and Tylenol/ibuprofen as needed for pain.  I suspect this was hyperventilation secondary to anxiety as it significantly improved with IV Ativan.  She does remain tearful however she has no suicidal homicidal ideation and is not holdable at this time.  I did discuss with her Decadron could make her mood more elevated and that should her breathing stay within normal limits, she does not have to take this medication at home.    Covid-19  Jonelle Khan was evaluated during a global COVID-19 pandemic, which necessitated consideration that the patient might be at risk for  infection with the SARS-CoV-2 virus that causes COVID-19.   Applicable protocols for evaluation were followed during the patient's care.   COVID-19 was considered as part of the patient's evaluation. The plan for testing is:  a test was obtained during this visit.    Diagnosis:    ICD-10-CM    1. Mild intermittent asthma with exacerbation  J45.21    2. Pneumonia of left lower lobe due to infectious organism  J18.9        Discharge Medications:  New Prescriptions    AZITHROMYCIN (ZITHROMAX) 250 MG TABLET    Take 2 tablets (500 mg) by mouth daily for 1 day, THEN 1 tablet (250 mg) daily for 4 days.    DEXAMETHASONE (DECADRON) 2 MG TABLET    Take 5 tablets (10 mg) by mouth once for 1 dose       Teja Reddy MD  [employer]  6:55 PM 02/13/21       Scribe Disclosure:  I, Josy Gudinonis, am serving as a scribe at 4:30 PM on 2/13/2021 to document services personally performed by Teja Reddy MD based on my observations and the provider's statements to me.        Teja Reddy MD  02/13/21 1734

## 2021-02-13 NOTE — ED NOTES
"Pt stating pain from BP cuff inflating on arm, pt tearful in bed, pt reports \"I have never felt anything like this before\" MD notified and another dose of ativan ordered.  "

## 2021-02-13 NOTE — PROGRESS NOTES
THIS IS A TRIAGE ENCOUNTER  Jonelle Khan is a 35 year old female with a history of asthma, obstructive sleep apnea.  She is presenting with a chief complaint of worsening, severe shortness of breath, (without relief from Albuterol inhalers and nebs), severe shooting pain down the arms and inner thighs, chills, hot flashes,, and frequent, excessive yawning for the past three days.  Patient will go to the emergency room now for further evaluation.  BP was unable to be obtained due to severe pain at the arms.      I told the patient that she would not be charged for this brief triage evaluation.  .      Craig Loredo MD

## 2021-02-13 NOTE — ED TRIAGE NOTES
"PT arrives crying uncontrollable, hyperventilating, and very anxious. Pt states 3 day hx intermittent arms \"locking up\" Pt uncooperative with blood pressures. Bilateral arm numbness and \"zinging\" up to arms. Pt states unable to take a deep breath and uncontrollable yawning. Pt has taken her albuterol inhaler, chest pain, and coughing. A/OX4  "

## 2021-02-13 NOTE — ED NOTES
Pt arrives from triage very anxious and hyperventilating, pt complaining of hand tingling and pain in arms in hands, MD ordered ativan

## 2021-02-14 NOTE — DISCHARGE INSTRUCTIONS
You should take your antibiotics as prescribed, you should continue to use your inhaler 1 to 2 puffs every 4 hours as needed for shortness of breath, and you can take the Decadron to help with your breathing.  Please note that the Decadron can elevate your mood a little bit but will help your breathing.  You should make an appointment to follow-up with your primary doctor first thing next week for recheck.  You should return the emergency room should you have worsening shortness of breath or high fever or worsening of your anxiety.

## 2021-02-14 NOTE — ED NOTES
"RN went to discharge pt, Pt tearful in bed, pt states \"you guys didn't do anything for me, I came in with hand pain and you gave me nothing for pain\" pt reassured by RN with conversation. RN asked pt if there was anything more that we could do for her pt not accepting of instructions and continuously stating \"my hands still hurt, \" pt moving hands around while in bed. RN notified MD, no further orders and no change to discharge plan.  "

## 2021-02-14 NOTE — ED NOTES
Pt inquired about taking an old prescription for oxycodone at home, RN advised pt not to take oxycodone at home and to follow up with PCP. MD notified.

## 2021-02-15 ENCOUNTER — MYC MEDICAL ADVICE (OUTPATIENT)
Dept: FAMILY MEDICINE | Facility: CLINIC | Age: 36
End: 2021-02-15

## 2021-02-15 LAB — INTERPRETATION ECG - MUSE: NORMAL

## 2021-02-15 NOTE — TELEPHONE ENCOUNTER
Called patient,    States she only wants to have a visit with Susan Haase and she wants it to be in person. Patient was in the ED 2/13 for SOB, bilateral nerve pain from hand to elbow, and anxiety like symptoms. Patient states she does not want to be evaluated at  or the ED, only wants to see PCP to sort things out.       States she has had a very hard time scheduling an appointment through [facility], called [facility] and scheduled an appointment for 140 PM at [facility] location with Dr David Gifford. Patient asked nurse to call and get next available in [place]. Previously saw a provider at [facility] who specialized in lower extremity injuries and fractures, did not have experience with neuropathy. Patient frustrated and feels like she is being passed along to providers, and know she has carpal tunnel and will need surgery, just wants to find a provider who can help her.    [facility] gave following instructions: Wear a mask and come alone unless medically necessary    Called patient and left voicemail to clinic with [facility] information above    Anjum Sutton RN

## 2021-02-15 NOTE — TELEPHONE ENCOUNTER
Routed FYI to , see mychart request, recommended virtual apt, pt want referrals, inform her if different, also pt went to ER this weekend, diagnosis of pneumonia  Madalyn López RN, BSN  Message handled by CLINIC NURSE.

## 2021-02-15 NOTE — TELEPHONE ENCOUNTER
Can you let Jonelle know that I can see her on 2/25 at 2 pm in the same day spot.    Thanks,  Susan Haase, CNP

## 2021-02-15 NOTE — TELEPHONE ENCOUNTER
Haase, Susan Rachele APRN CNP     Placed call to patient     1. Discussed anxiety, patient is struggling.  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   Patient feels that she is okay to wait until scheduled visit with Haase, Susan Rachele APRN CNP   Next 5 appointments (look out 90 days)    Mar 01, 2021 10:30 AM  (Arrive by 10:20 AM)  Office Visit with Susan Rachele Haase, APRN CNP  [facility] ([facility] ) [address] [place] [id number] [phone]        RN asked patient to return call to get sooner appt if anxiety worsens or unable to wait until this time     2. Patient has an appt scheduled with [facility] 2/16/2021 @ 10:30     Ritu Adams Registered Nurse, PAL (Patient Advocate Liason)   [facility]   [phone]

## 2021-02-16 ENCOUNTER — MYC MEDICAL ADVICE (OUTPATIENT)
Dept: FAMILY MEDICINE | Facility: CLINIC | Age: 36
End: 2021-02-16

## 2021-02-16 NOTE — TELEPHONE ENCOUNTER
Routing to Jose aNik,    Please see patients mychart message and advise if any action is necessary    Anjum Sutton RN

## 2021-02-16 NOTE — TELEPHONE ENCOUNTER
Reviewed message. I have personally never seen patient. Her pcp is milena. It appears this may have been meant for milena to review. Recommending routing to her for advice on other concerns.     In terms of preop, she should keep this with me, but likely recommend scheduling future visit with pcp to discuss her other concerns.     -alfa souza, pac

## 2021-02-19 ENCOUNTER — OFFICE VISIT (OUTPATIENT)
Dept: FAMILY MEDICINE | Facility: CLINIC | Age: 36
End: 2021-02-19
Payer: COMMERCIAL

## 2021-02-19 ENCOUNTER — ANCILLARY PROCEDURE (OUTPATIENT)
Dept: GENERAL RADIOLOGY | Facility: CLINIC | Age: 36
End: 2021-02-19
Attending: PHYSICIAN ASSISTANT
Payer: COMMERCIAL

## 2021-02-19 VITALS
DIASTOLIC BLOOD PRESSURE: 86 MMHG | SYSTOLIC BLOOD PRESSURE: 128 MMHG | BODY MASS INDEX: 26.75 KG/M2 | HEART RATE: 78 BPM | TEMPERATURE: 98.4 F | RESPIRATION RATE: 14 BRPM | WEIGHT: 151 LBS | HEIGHT: 63 IN

## 2021-02-19 DIAGNOSIS — J18.9 COMMUNITY ACQUIRED PNEUMONIA OF LEFT LOWER LOBE OF LUNG: ICD-10-CM

## 2021-02-19 DIAGNOSIS — G56.01 CARPAL TUNNEL SYNDROME OF RIGHT WRIST: ICD-10-CM

## 2021-02-19 DIAGNOSIS — J45.20 MILD INTERMITTENT ASTHMA WITHOUT COMPLICATION: ICD-10-CM

## 2021-02-19 DIAGNOSIS — Z01.818 PREOP GENERAL PHYSICAL EXAM: Primary | ICD-10-CM

## 2021-02-19 PROCEDURE — 71046 X-RAY EXAM CHEST 2 VIEWS: CPT | Performed by: RADIOLOGY

## 2021-02-19 PROCEDURE — 99214 OFFICE O/P EST MOD 30 MIN: CPT | Performed by: PHYSICIAN ASSISTANT

## 2021-02-19 RX ORDER — ALBUTEROL SULFATE 90 UG/1
2 AEROSOL, METERED RESPIRATORY (INHALATION) EVERY 6 HOURS
Qty: 18 G | Refills: 1 | Status: SHIPPED | OUTPATIENT
Start: 2021-02-19 | End: 2022-01-13

## 2021-02-19 ASSESSMENT — ANXIETY QUESTIONNAIRES
7. FEELING AFRAID AS IF SOMETHING AWFUL MIGHT HAPPEN: NOT AT ALL
2. NOT BEING ABLE TO STOP OR CONTROL WORRYING: NOT AT ALL
5. BEING SO RESTLESS THAT IT IS HARD TO SIT STILL: NOT AT ALL
1. FEELING NERVOUS, ANXIOUS, OR ON EDGE: SEVERAL DAYS
7. FEELING AFRAID AS IF SOMETHING AWFUL MIGHT HAPPEN: NOT AT ALL
GAD7 TOTAL SCORE: 3
6. BECOMING EASILY ANNOYED OR IRRITABLE: SEVERAL DAYS
GAD7 TOTAL SCORE: 3
4. TROUBLE RELAXING: SEVERAL DAYS
3. WORRYING TOO MUCH ABOUT DIFFERENT THINGS: NOT AT ALL
GAD7 TOTAL SCORE: 3

## 2021-02-19 ASSESSMENT — PATIENT HEALTH QUESTIONNAIRE - PHQ9
SUM OF ALL RESPONSES TO PHQ QUESTIONS 1-9: 8
SUM OF ALL RESPONSES TO PHQ QUESTIONS 1-9: 8
10. IF YOU CHECKED OFF ANY PROBLEMS, HOW DIFFICULT HAVE THESE PROBLEMS MADE IT FOR YOU TO DO YOUR WORK, TAKE CARE OF THINGS AT HOME, OR GET ALONG WITH OTHER PEOPLE: SOMEWHAT DIFFICULT

## 2021-02-19 ASSESSMENT — MIFFLIN-ST. JEOR: SCORE: 1354.06

## 2021-02-19 NOTE — PROGRESS NOTES
[address] [address]  Phone: [phone]  Primary Provider: Haase, Susan Rachele  Pre-op Performing Provider: ADAMA PISANO    PREOPERATIVE EVALUATION:  Today's date: 2/19/2021    Jonelle Khan is a 35 year old female who presents for a preoperative evaluation.    Surgical Information:  Surgery/Procedure: endoscopic right carpal tunnel release  Surgery Location: [facility]  Surgeon:   Surgery Date: 2/22/2021  Time of Surgery: 12:00  Where patient plans to recover: At home with family  Fax number for surgical facility: [phone]    Type of Anesthesia Anticipated: General    Assessment & Plan     The proposed surgical procedure is considered LOW risk.    Preop general physical exam  Stable exam.     Carpal tunnel syndrome of right wrist      Mild intermittent asthma without complication    - albuterol (PROAIR HFA/PROVENTIL HFA/VENTOLIN HFA) 108 (90 Base) MCG/ACT inhaler; Inhale 2 puffs into the lungs every 6 hours    Community acquired pneumonia of left lower lobe of lung  Exam benign. Finished treatment. CXR clear. Cleared for surgery.   - XR Chest 2 Views         Risks and Recommendations:  The patient has the following additional risks and recommendations for perioperative complications:  Pulmonary:    - pre and post albuterol recommended.     ONELIA  Bring cpap to surgery.     Medication Instructions:  Patient is to take all scheduled medications on the day of surgery    RECOMMENDATION:  APPROVAL GIVEN to proceed with proposed procedure, without further diagnostic evaluation.        22 minutes spent on the date of the encounter doing chart review, interpretation of tests, patient visit and documentation     Subjective     HPI related to upcoming procedure: history of right carpal tunnel. The above procedure was deemed the next best step in management.     Preop Questions 2/19/2021   1. Have you ever had  a heart attack or stroke? No   2. Have you ever had surgery on your heart or blood vessels, such as a stent placement, a coronary artery bypass, or surgery on an artery in your head, neck, heart, or legs? No   3. Do you have chest pain with activity? No   4. Do you have a history of  heart failure? No   5. Do you currently have a cold, bronchitis or symptoms of other infection? YES - recent pneumonia. covid negative. Improved symptoms. Finished abx yesterday.    6. Do you have a cough, shortness of breath, or wheezing? YES - as above    7. Do you or anyone in your family have previous history of blood clots? UNKNOWN -    8. Do you or does anyone in your family have a serious bleeding problem such as prolonged bleeding following surgeries or cuts? UNKNOWN -    9. Have you ever had problems with anemia or been told to take iron pills? No   10. Have you had any abnormal blood loss such as black, tarry or bloody stools, or abnormal vaginal bleeding? No   11. Have you ever had a blood transfusion? No   12. Are you willing to have a blood transfusion if it is medically needed before, during, or after your surgery? Yes   13. Have you or any of your relatives ever had problems with anesthesia? No   14. Do you have sleep apnea, excessive snoring or daytime drowsiness? YES - levi.    14a. Do you have a CPAP machine? Yes   15. Do you have any artifical heart valves or other implanted medical devices like a pacemaker, defibrillator, or continuous glucose monitor? No   16. Do you have artificial joints? No   17. Are you allergic to latex? No   18. Is there any chance that you may be pregnant? No       Health Care Directive:  Patient has a Health Care Directive on file      Preoperative Review of :   reviewed - no record of controlled substances prescribed.      Status of Chronic Conditions:  See problem list for active medical problems.  Problems all longstanding and stable, except as noted/documented.  See ROS for  pertinent symptoms related to these conditions.    DEPRESSION - Patient has a long history of Depression of moderate severity requiring medication for control with recent symptoms being gradually worsening..Current symptoms of depression include anxiety.     Asthma. Mild. Controlled on current regimen. Mild residual cough from pneumonia.       Review of Systems     Other than above,   CONSTITUTIONAL: NEGATIVE for fever, chills, change in weight  INTEGUMENTARY/SKIN: NEGATIVE for worrisome rashes, moles or lesions  EYES: NEGATIVE for vision changes or irritation  ENT/MOUTH: NEGATIVE for ear, mouth and throat problems  RESP: NEGATIVE for significant cough or SOB  BREAST: NEGATIVE for masses, tenderness or discharge  CV: NEGATIVE for chest pain, palpitations or peripheral edema  GI: NEGATIVE for nausea, abdominal pain, heartburn, or change in bowel habits  : NEGATIVE for frequency, dysuria, or hematuria  MUSCULOSKELETAL: NEGATIVE for significant arthralgias or myalgia  NEURO: NEGATIVE for weakness, dizziness or paresthesias  ENDOCRINE: NEGATIVE for temperature intolerance, skin/hair changes  HEME: NEGATIVE for bleeding problems  PSYCHIATRIC: NEGATIVE for changes in mood or affect    Patient Active Problem List    Diagnosis Date Noted     Delivery of pregnancy by  section 2020     Priority: Medium     Encounter for triage in pregnant patient 2020     Priority: Medium     S/P  section 2018     Priority: Medium     Vitamin D deficiency 2017     Priority: Medium     ONELIA (obstructive sleep apnea) 2017     Priority: Medium     Has CPAP       Anxiety 2017     Priority: Medium     Intractable nausea and vomiting 2017     Priority: Medium     Mild intermittent asthma without complication 2016     Priority: Medium     Bipolar 2 disorder (H) 2015     Priority: Medium     Seen by Cullen Booker (CNS) at [facility]        Migraine without aura and without  status migrainosus, not intractable 2015     Priority: Medium     Health Care Home 2013     Priority: Medium     State Tier Level:  Tier 2  Status:  Closed  Care Coordinator:  Anna Gottlieb LSW    See Letters for [facility] Care Plan             Allergic rhinitis 2005     Priority: Medium     Problem list name updated by automated process. Provider to review        Past Medical History:   Diagnosis Date     Anxiety      Asthma      Depressive disorder      Kidney infection      LSIL (low grade squamous intraepithelial lesion) on Pap smear 7/20/10    resolved     Mild major depression (H)      Sleep apnea     no cpap     Past Surgical History:   Procedure Laterality Date      SECTION  12/10/2013    Procedure:  SECTION;   Section for failure to progress. ;  Surgeon: Monique Can MD;  Location: [facility] L[facility]      SECTION N/A 2018    Procedure: REPEAT  SECTION;  Surgeon: Jose Tyler MD;  Location: [facility] OR     COMBINED  SECTION, SALPINGECTOMY BILATERAL N/A 2020    Procedure: Repeat  SECTION, WITH BILATERAL SALPINGECTOMY;  Surgeon: Octavia Maya MD;  Location: [facility]     LAPAROSCOPIC CHOLECYSTECTOMY N/A 2015    Procedure: LAPAROSCOPIC CHOLECYSTECTOMY;  Surgeon: Yuliet Hutton MD;  Location: [facility] OR     ZZC NONSPECIFIC PROCEDURE  age 10     chalazion removal under general      Current Outpatient Medications   Medication Sig Dispense Refill     albuterol (PROAIR HFA/PROVENTIL HFA/VENTOLIN HFA) 108 (90 Base) MCG/ACT inhaler Inhale 2 puffs into the lungs every 6 hours 18 g 1     Prenatal Vit-Fe Fumarate-FA (PRENATAL COMPLETE) 14-0.4 MG TABS        sertraline (ZOLOFT) 100 MG tablet Take 1.5 tablets (150 mg) by mouth daily 135 tablet 1       Allergies   Allergen Reactions     Ceclor [Cefaclor] Hives        Social History     Tobacco Use     Smoking status: Former Smoker     Types: Vaping Device     Smokeless tobacco:  "Never Used     Tobacco comment: occasional   Substance Use Topics     Alcohol use: Not Currently     Alcohol/week: 1.0 standard drinks     Types: 1 Cans of beer per week     Family History   Problem Relation Age of Onset     Family History Negative Mother      Gallbladder Disease Mother      Cancer Maternal Grandfather         skin CA      Gallbladder Disease Maternal Grandfather      Asthma Father      Gastrointestinal Disease Father         s/p maninder. Also Paunt and Mgf s/p maninder.      Asthma Brother      Asthma Sister      Depression Sister      Anxiety Disorder Sister      Heart Disease Paternal Grandfather         MI in his 60's      Breast Cancer Maternal Grandmother          M great grandmother  in her 60's      Depression Maternal Grandmother      Coronary Artery Disease Maternal Grandmother      Diabetes Paternal Aunt         Dx in her mid to late 30's (sounds like DM2)     Depression Paternal Aunt      Anxiety Disorder Paternal Aunt      Substance Abuse Paternal Aunt      C.A.D. Paternal Grandmother         Stent placed age 63      Depression Paternal Grandmother      Depression Paternal Uncle      Substance Abuse Paternal Uncle      Anxiety Disorder Cousin      History   Drug Use     Types: Marijuana     Comment: once per day through duration of pregnancy         Objective     /86 (BP Location: Right arm, Patient Position: Chair, Cuff Size: Adult Regular)   Pulse 78   Temp 98.4  F (36.9  C) (Oral)   Resp 14   Ht 1.6 m (5' 3\")   Wt 68.5 kg (151 lb)   LMP 2021   BMI 26.75 kg/m      Physical Exam    GENERAL APPEARANCE: healthy, alert and no distress     EYES: EOMI, PERRL     HENT: ear canals and TM's normal and nose and mouth without ulcers or lesions     NECK: no adenopathy, no asymmetry, masses, or scars and thyroid normal to palpation     RESP: lungs clear to auscultation - no rales, rhonchi or wheezes     CV: regular rates and rhythm, normal S1 S2, no S3 or S4 and no murmur, click " or rub     ABDOMEN:  soft, nontender, no HSM or masses and bowel sounds normal     MS: extremities normal- no gross deformities noted, no evidence of inflammation in joints, FROM in all extremities.     SKIN: no suspicious lesions or rashes     NEURO: Normal strength and tone, sensory exam grossly normal, mentation intact and speech normal     PSYCH: mentation appears normal. and affect normal/bright     LYMPHATICS: No cervical adenopathy    Recent Labs   Lab Test 02/13/21  1629 08/15/20  0625   HGB 14.2 10.8*     --      --    POTASSIUM 3.3*  --    CR 0.64  --         Diagnostics:  No labs were ordered during this visit.   No EKG required for low risk surgery (cataract, skin procedure, breast biopsy, etc).     CXR: clear. Pending radiology review. Questionable pneumonia previously seen is no longer present.     Revised Cardiac Risk Index (RCRI):  The patient has the following serious cardiovascular risks for perioperative complications:   - No serious cardiac risks = 0 points     RCRI Interpretation: 0 points: Class I (very low risk - 0.4% complication rate)         Signed Electronically by: Vitaly Naik PA-C  Copy of this evaluation report is provided to requesting physician.    [facility] Guidelines    Revised Cardiac Risk Index

## 2021-02-19 NOTE — PATIENT INSTRUCTIONS

## 2021-02-20 ASSESSMENT — ANXIETY QUESTIONNAIRES: GAD7 TOTAL SCORE: 3

## 2021-02-20 ASSESSMENT — PATIENT HEALTH QUESTIONNAIRE - PHQ9: SUM OF ALL RESPONSES TO PHQ QUESTIONS 1-9: 8

## 2021-07-05 ENCOUNTER — TELEPHONE (OUTPATIENT)
Dept: FAMILY MEDICINE | Facility: CLINIC | Age: 36
End: 2021-07-05

## 2021-07-05 ENCOUNTER — VIRTUAL VISIT (OUTPATIENT)
Dept: FAMILY MEDICINE | Facility: CLINIC | Age: 36
End: 2021-07-05
Payer: COMMERCIAL

## 2021-07-05 DIAGNOSIS — F41.9 ANXIETY: Primary | ICD-10-CM

## 2021-07-05 DIAGNOSIS — J45.20 MILD INTERMITTENT ASTHMA WITHOUT COMPLICATION: ICD-10-CM

## 2021-07-05 PROBLEM — Z36.89 ENCOUNTER FOR TRIAGE IN PREGNANT PATIENT: Status: RESOLVED | Noted: 2020-07-30 | Resolved: 2021-07-05

## 2021-07-05 PROCEDURE — 99214 OFFICE O/P EST MOD 30 MIN: CPT | Mod: 95 | Performed by: NURSE PRACTITIONER

## 2021-07-05 RX ORDER — BUPROPION HYDROCHLORIDE 150 MG/1
150 TABLET ORAL EVERY MORNING
Qty: 30 TABLET | Refills: 1 | Status: SHIPPED | OUTPATIENT
Start: 2021-07-05 | End: 2022-01-13

## 2021-07-05 ASSESSMENT — ANXIETY QUESTIONNAIRES
7. FEELING AFRAID AS IF SOMETHING AWFUL MIGHT HAPPEN: NOT AT ALL
GAD7 TOTAL SCORE: 6
IF YOU CHECKED OFF ANY PROBLEMS ON THIS QUESTIONNAIRE, HOW DIFFICULT HAVE THESE PROBLEMS MADE IT FOR YOU TO DO YOUR WORK, TAKE CARE OF THINGS AT HOME, OR GET ALONG WITH OTHER PEOPLE: SOMEWHAT DIFFICULT
5. BEING SO RESTLESS THAT IT IS HARD TO SIT STILL: NOT AT ALL
2. NOT BEING ABLE TO STOP OR CONTROL WORRYING: NOT AT ALL
1. FEELING NERVOUS, ANXIOUS, OR ON EDGE: SEVERAL DAYS
6. BECOMING EASILY ANNOYED OR IRRITABLE: SEVERAL DAYS
3. WORRYING TOO MUCH ABOUT DIFFERENT THINGS: NEARLY EVERY DAY

## 2021-07-05 ASSESSMENT — PATIENT HEALTH QUESTIONNAIRE - PHQ9
SUM OF ALL RESPONSES TO PHQ QUESTIONS 1-9: 13
5. POOR APPETITE OR OVEREATING: SEVERAL DAYS

## 2021-07-05 NOTE — Clinical Note
Please abstract the following data from this visit with this patient into the appropriate field in Epic:    Tests that can be patient reported without a hard copy:    Pap smear done on this date: 7/1/2020 (approximately), by this group: [facility], results were normal.     Other Tests found in the patient's chart through Chart Review/Care Everywhere:    Note to Abstraction: If this section is blank, no results were found via Chart Review/Care Everywhere.

## 2021-07-05 NOTE — TELEPHONE ENCOUNTER
Patient Quality Outreach Summary      Summary:    Patient is due/failing the following:   Cervical Cancer Screening - PAP Needed    Type of outreach:    Called [facility] waiting for pap done on 5/29/18     Questions for provider review:    None                                                                                                                    Suzie Escobar MA       Chart routed to none.

## 2021-07-05 NOTE — PROGRESS NOTES
"Jonelle is a 35 year old who is being evaluated via a billable video visit.      How would you like to obtain your AVS? MyChart  If the video visit is dropped, the invitation should be resent by: Text to cell phone: [phone]  Will anyone else be joining your video visit? No  Video Start Time: 1656    Assessment & Plan     Anxiety: PHQ 9 of 13, LUIS FELIPE 7 of 6.  Denies thoughts of harming self or others, will decrease zofran to 50 mg for the next 4 weeks, add bupropion 150 mg daily.       - buPROPion (WELLBUTRIN XL) 150 MG 24 hr tablet; Take 1 tablet (150 mg) by mouth every morning    Mild intermittent asthma without complication: ACT of 22.    [id number]}     BMI:   Estimated body mass index is 26.75 kg/m  as calculated from the following:    Height as of 2/19/21: 1.6 m (5' 3\").    Weight as of 2/19/21: 68.5 kg (151 lb).   Return in about 6 weeks (around 8/16/2021) for Routine Visit, anxiety, virtual.    Susan Haase, APRN CNP  [facility]    Subjective   Jonelle is a 35 year old who presents for the following health issues   HPI     Depression and Anxiety Follow-Up    How are you doing with your depression since your last visit? No change    How are you doing with your anxiety since your last visit?  No change    Are you having other symptoms that might be associated with depression or anxiety? No    Have you had a significant life event? OTHER: Family      Do you have any concerns with your use of alcohol or other drugs? No  Has been taking sertraline 100 mg daily as prescribed.  PHQ 9 of 13, LUIS FELIPE 7 of 6.  Feels that the last month she has been doing better, sleeping better and depression has improved.        Social History     Tobacco Use     Smoking status: Former Smoker     Types: Vaping Device     Smokeless tobacco: Never Used     Tobacco comment: occasional   Substance Use Topics     Alcohol use: Not Currently     Alcohol/week: 1.0 standard drinks     Types: 1 Cans of beer per week     Drug " use: Yes     Types: Marijuana     Comment: once per day through duration of pregnancy     PHQ 9/29/2020 2/19/2021 7/5/2021   PHQ-9 Total Score 5 8 13   Q9: Thoughts of better off dead/self-harm past 2 weeks Not at all Not at all Not at all     LUIS FELIPE-7 SCORE 9/29/2020 2/19/2021 7/5/2021   Total Score - - -   Total Score - 3 (minimal anxiety) -   Total Score 5 3 6     Last PHQ-9 7/5/2021   1.  Little interest or pleasure in doing things 1   2.  Feeling down, depressed, or hopeless 0   3.  Trouble falling or staying asleep, or sleeping too much 3   4.  Feeling tired or having little energy 3   5.  Poor appetite or overeating 2   6.  Feeling bad about yourself 0   7.  Trouble concentrating 3   8.  Moving slowly or restless 1   Q9: Thoughts of better off dead/self-harm past 2 weeks 0   PHQ-9 Total Score 13   Difficulty at work, home, or with people Somewhat difficult     LUIS FELIPE-7  7/5/2021   1. Feeling nervous, anxious, or on edge 1   2. Not being able to stop or control worrying 0   3. Worrying too much about different things 3   4. Trouble relaxing 1   5. Being so restless that it is hard to sit still 0   6. Becoming easily annoyed or irritable 1   7. Feeling afraid, as if something awful might happen 0   LUIS FELIPE-7 Total Score 6   If you checked any problems, how difficult have they made it for you to do your work, take care of things at home, or get along with other people? Somewhat difficult       Asthma Follow-Up    Was ACT completed today?    Yes    ACT Total Scores 7/5/2021   ACT TOTAL SCORE -   ASTHMA ER VISITS -   ASTHMA HOSPITALIZATIONS -   ACT TOTAL SCORE (Goal Greater than or Equal to 20) 22   In the past 12 months, how many times did you visit the emergency room for your asthma without being admitted to the hospital? 0   In the past 12 months, how many times were you hospitalized overnight because of your asthma? 0          How many days per week do you miss taking your asthma controller medication?  I do not have an  asthma controller medication    Please describe any recent triggers for your asthma: humidity, exercise or sports and anxitey     Have you had any Emergency Room Visits, Urgent Care Visits, or Hospital Admissions since your last office visit?  No  ACT of 22, well controlled.       Review of Systems   CONSTITUTIONAL: NEGATIVE for fever, chills, change in weight  RESP: NEGATIVE for significant cough or SOB  CV: NEGATIVE for chest pain, palpitations or peripheral edema  PSYCHIATRIC: see HPI      Objective           Vitals:  No vitals were obtained today due to virtual visit.    Physical Exam   GENERAL: Healthy, alert and no distress  RESP: No audible wheeze, cough, or visible cyanosis.  No visible retractions or increased work of breathing.    NEURO: Cranial nerves grossly intact.  Mentation and speech appropriate for age.  PSYCH: Mentation appears normal, affect normal/bright, judgement and insight intact, normal speech and appearance well-groomed.            Video-Visit Details    Type of service:  Video Visit    Video End Time:1711    Originating Location (pt. Location): Home    Distant Location (provider location):  [id number]     Platform used for Video Visit: [employer]

## 2021-07-06 ASSESSMENT — ANXIETY QUESTIONNAIRES: GAD7 TOTAL SCORE: 6

## 2021-07-06 ASSESSMENT — ASTHMA QUESTIONNAIRES: ACT_TOTALSCORE: 22

## 2021-08-19 ENCOUNTER — HOSPITAL ENCOUNTER (EMERGENCY)
Facility: CLINIC | Age: 36
Discharge: HOME | End: 2021-08-19
Attending: EMERGENCY MEDICINE | Admitting: EMERGENCY MEDICINE
Payer: COMMERCIAL

## 2021-08-19 ENCOUNTER — APPOINTMENT (OUTPATIENT)
Dept: ULTRASOUND IMAGING | Facility: CLINIC | Age: 36
End: 2021-08-19
Attending: EMERGENCY MEDICINE
Payer: COMMERCIAL

## 2021-08-19 VITALS
HEART RATE: 61 BPM | OXYGEN SATURATION: 98 % | TEMPERATURE: 99.8 F | DIASTOLIC BLOOD PRESSURE: 71 MMHG | RESPIRATION RATE: 18 BRPM | SYSTOLIC BLOOD PRESSURE: 105 MMHG

## 2021-08-19 DIAGNOSIS — J06.9 VIRAL URI: ICD-10-CM

## 2021-08-19 DIAGNOSIS — N93.8 DYSFUNCTIONAL UTERINE BLEEDING: ICD-10-CM

## 2021-08-19 LAB
ERYTHROCYTE [DISTWIDTH] IN BLOOD BY AUTOMATED COUNT: 13 % (ref 10–15)
HCG SERPL QL: NEGATIVE
HCT VFR BLD AUTO: 41 % (ref 35–47)
HGB BLD-MCNC: 13.2 G/DL (ref 11.7–15.7)
HOLD SPECIMEN: NORMAL
HOLD SPECIMEN: NORMAL
INR PPP: 0.9 (ref 0.85–1.15)
MCH RBC QN AUTO: 29.5 PG (ref 26.5–33)
MCHC RBC AUTO-ENTMCNC: 32.2 G/DL (ref 31.5–36.5)
MCV RBC AUTO: 92 FL (ref 78–100)
PLATELET # BLD AUTO: 197 10E3/UL (ref 150–450)
RBC # BLD AUTO: 4.48 10E6/UL (ref 3.8–5.2)
SARS-COV-2 RNA RESP QL NAA+PROBE: NEGATIVE
WBC # BLD AUTO: 5.6 10E3/UL (ref 4–11)

## 2021-08-19 PROCEDURE — 76830 TRANSVAGINAL US NON-OB: CPT

## 2021-08-19 PROCEDURE — 99284 EMERGENCY DEPT VISIT MOD MDM: CPT | Mod: 25

## 2021-08-19 PROCEDURE — 84703 CHORIONIC GONADOTROPIN ASSAY: CPT | Performed by: EMERGENCY MEDICINE

## 2021-08-19 PROCEDURE — 85610 PROTHROMBIN TIME: CPT | Performed by: EMERGENCY MEDICINE

## 2021-08-19 PROCEDURE — 85014 HEMATOCRIT: CPT | Performed by: EMERGENCY MEDICINE

## 2021-08-19 PROCEDURE — 36415 COLL VENOUS BLD VENIPUNCTURE: CPT | Performed by: EMERGENCY MEDICINE

## 2021-08-19 PROCEDURE — 87635 SARS-COV-2 COVID-19 AMP PRB: CPT | Performed by: EMERGENCY MEDICINE

## 2021-08-19 PROCEDURE — C9803 HOPD COVID-19 SPEC COLLECT: HCPCS

## 2021-08-19 RX ORDER — LEVONORGESTREL / ETHINYL ESTRADIOL AND ETHINYL ESTRADIOL 150-30(84)
1 KIT ORAL DAILY
Qty: 91 TABLET | Refills: 0 | Status: SHIPPED | OUTPATIENT
Start: 2021-08-19 | End: 2022-01-13

## 2021-08-19 ASSESSMENT — ENCOUNTER SYMPTOMS
SORE THROAT: 1
SHORTNESS OF BREATH: 1
FEVER: 1
COUGH: 1
RHINORRHEA: 1
ABDOMINAL PAIN: 1

## 2021-08-19 NOTE — ED TRIAGE NOTES
Pt presents with vaginal bleeding x5 days. Pt originally thought this was her normal period but the bleeding has been much heavier, and she has L. Lower pelvic pain. Pt is bleeding through approx. 1 regular tampon per hr. Pt has SOB and dizziness. ABCs intact. Hx of tubal ligation 1 year ago and reports no complications from that.

## 2021-08-19 NOTE — ED NOTES
Pt upset prior to leaving stating she does not feel like birth control is going to help her. MD went back into room to discuss plan. Pt to follow up with PCP to see if there are other means to help with her symptoms. Pt continues to be upset but agreeable with plan.

## 2021-08-19 NOTE — ED PROVIDER NOTES
History   Chief Complaint:  Vaginal Bleeding       HPI   Jonelle Khan is a 35 year old female who presents for evaluation of vaginal bleeding. Approximately a week ago the patient started to develop some left lower abdominal pain. Approximately five days ago the patient started to develop bright red vaginal bleeding that has been heavier than her regular periods. Since then she reports that she has been bleeding through approximately one regular tampon an hour. Due to her ongoing bleeding today, the patient called her OBGYN clinic and was advised to come into the ED for evaluation. She has had a tubal ligation.     She also reports that for the last month she has had rhinorrhea, sore throat, cough, and shortness of breath, and she was exposed to a nephew who tested positive for COVID-19 approximately 14 days ago. She has had a subjective fever as recently as yesterday night.     Review of Systems   Constitutional: Positive for fever.   HENT: Positive for rhinorrhea and sore throat.    Respiratory: Positive for cough and shortness of breath.    Gastrointestinal: Positive for abdominal pain.   Genitourinary: Positive for vaginal bleeding.   All other systems reviewed and are negative.      Allergies:  Cefaclor     Medications:  Albuterol inhaler   Wellbutrin XL   Zoloft     Past Medical History:    Anxiety  Asthma  Depression  Sleep apnea   Bipolar 2 disorder   Migraine      Past Surgical History:     section    section with bilateral salpingectomy   Laparoscopic cholecystectomy  Chalazion removal      Family History:    Gallbladder disease - Mother   Asthma - Father, brother, and sister   Depression - Sister   Anxiety - Sister      Social History:  The patient presents to the ED alone.     Physical Exam     Patient Vitals for the past 24 hrs:   BP Temp Pulse Resp SpO2   21 1715 105/71 -- -- -- --   21 1645 100/59 -- 61 -- 98 %   21 1500 120/76 -- 70 -- 98 %   21 1445  121/82 -- 73 -- 99 %   21 1430 117/82 -- 94 -- 99 %   21 1344 117/77 99.8  F (37.7  C) 83 18 100 %       Physical Exam    Eyes: No discharge, symmetrical lids  ENT: Moist mucous membranes, no ear discharge. Mildly erythematous posterior OP with no tonsilar exudates.  Shotty tender bilateral cervical LAD.  Neck: Full range of motion  Respiratory: CTAB, no wheezes  Cardiovascular: Regular rate and rhythm, no lower extremity edema  Chest: Equal rise  Gastrointestinal: Soft. Nondistended. NTTP. No rebound or guarding  Musculoskeletal: No gross deformities.   Skin: Warm and well perfused. No visible rash.  Neurologic: Moves all extremities, speech fluent without dysarthria  Psychiatric: Appropriate affect, alert and interactive       Emergency Department Course     Imaging:  US Pelvis Complete w Transvag and Doppler LmtPel Duplex Limited:  IMPRESSION:   1.  Unremarkable pelvic ultrasound.   Per radiology.     Laboratory:  CBC: WBC 5.6, HGB 13.2,    INR: 0.90  HCG Qualitative Serum: Negative    Symptomatic SARS-CoV2 (COVID19) Virus PCR: Negative        Emergency Department Course:  Reviewed:  I reviewed nursing notes, vitals and past medical history    Assessments:  1428: I obtained history and examined the patient as noted above.     1700: I updated and reassessed the patient.     1726: I updated and reassessed the patient.      Disposition:  The patient was discharged to home.     Impression & Plan     Medical Decision MakinF presenting with vaginal bleeding, sore throat, rhinorrhea.  Differential diagnosis includes but is not limited to COVID, strep pharyngitis, DUB, ovarian torsion, endometriosis  Regarding her symptoms of cough, sore throat, shortness of breath, COVID exposure, COVID test obtained, negative.  She has only 1 centor criteria present; strep testing therefore deferred.  Suspect other viral process, versus lingering COVID symptoms with false negative test.  Regarding vaginal  bleeding, her hgb is 13.2, she is hemodynamically stable.  TV US obtained, reassuring.  Suspect DUB and anovulatory cycles contributing to pt's symptoms.  Plan to prescribe OCPs for this.  She is quite tearful and upset that she was told to come to the ED by her PCP and OBGYN only to obtain OCPs.  Discussed with pt that, thankfully, no emergent process is present today, that it is difficult to ascertain presence of emergent condition on the phone by nurse triage lines and that OCPs are the first line treatment for DUB, and that no process treatable by antibiotics appears to be present on exam today.  Encouraged her to follow-up closely with her PCP and OB.  She was made welcome to return for any concerns.          Diagnosis:    ICD-10-CM    1. Dysfunctional uterine bleeding  N93.8    2. Viral URI  J06.9         Discharge Medications:  New Prescriptions    LEVONORGEST-ETH ESTRAD 91-DAY (SEASONIQUE) 0.15-0.03 &0.01 MG TABLET    Take 1 tablet by mouth daily       Scribe Disclosure:  I, Brody Pagan, am serving as a scribe at 2:28 PM on 8/19/2021 to document services personally performed by Cory Machuca MD based on my observations and the provider's statements to me.             Cory Machuca MD  08/19/21 1805       Cory Machuca MD  08/19/21 1805

## 2021-10-13 ENCOUNTER — OFFICE VISIT (OUTPATIENT)
Dept: URGENT CARE | Facility: URGENT CARE | Age: 36
End: 2021-10-13
Payer: COMMERCIAL

## 2021-10-13 VITALS
OXYGEN SATURATION: 98 % | TEMPERATURE: 99.5 F | RESPIRATION RATE: 18 BRPM | HEART RATE: 88 BPM | DIASTOLIC BLOOD PRESSURE: 80 MMHG | WEIGHT: 133.5 LBS | SYSTOLIC BLOOD PRESSURE: 120 MMHG | BODY MASS INDEX: 23.65 KG/M2

## 2021-10-13 DIAGNOSIS — R05.9 COUGH IN ADULT: Primary | ICD-10-CM

## 2021-10-13 LAB — DEPRECATED S PYO AG THROAT QL EIA: NEGATIVE

## 2021-10-13 PROCEDURE — U0003 INFECTIOUS AGENT DETECTION BY NUCLEIC ACID (DNA OR RNA); SEVERE ACUTE RESPIRATORY SYNDROME CORONAVIRUS 2 (SARS-COV-2) (CORONAVIRUS DISEASE [COVID-19]), AMPLIFIED PROBE TECHNIQUE, MAKING USE OF HIGH THROUGHPUT TECHNOLOGIES AS DESCRIBED BY CMS-2020-01-R: HCPCS | Performed by: FAMILY MEDICINE

## 2021-10-13 PROCEDURE — 99213 OFFICE O/P EST LOW 20 MIN: CPT | Performed by: FAMILY MEDICINE

## 2021-10-13 PROCEDURE — 87651 STREP A DNA AMP PROBE: CPT | Performed by: FAMILY MEDICINE

## 2021-10-13 PROCEDURE — U0005 INFEC AGEN DETEC AMPLI PROBE: HCPCS | Performed by: FAMILY MEDICINE

## 2021-10-14 LAB
GROUP A STREP BY PCR: NOT DETECTED
SARS-COV-2 RNA RESP QL NAA+PROBE: NEGATIVE

## 2021-10-14 NOTE — PROGRESS NOTES
ASSESSMENT:  Viral URI with cough  RST negative.  COVID pending.  Strep PCR pending.  Vitals all reassuring.    PLAN:  Patient Instructions   We will call tomorrow if the strep confirmation test is positive. Isolate until we know COVID test is negative.    Continue symptomatic care with OTC analgesics as needed, fluids, rest.  Encouraged COVID vaccination.    SUBJECTIVE:  Jonelle Khan is a 36 year old female who presents to [facility] for evaluation of cough, body aches, and sore throat.  Daughter tested positive for strep earlier today.  Son had a positive COVID exposure in his classroom recently, but no direct exposure for the patient.  She is not yet vaccinated for COVID.  No rashes.  Feeling a little short of breath.    OBJECTIVE:  /80 (BP Location: Right arm, Patient Position: Chair, Cuff Size: Adult Regular)   Pulse 88   Temp 99.5  F (37.5  C) (Oral)   Resp 18   Wt 60.6 kg (133 lb 8 oz)   SpO2 98%   Breastfeeding No   BMI 23.65 kg/m    GEN: well-appearing, in NAD  ENT: TMs normal, pharynx slightly erythematous but no tonsillar enlargement or exudates, uvula midline  Neck:  Supple, no LAD  Lungs:  CTAB    Results for orders placed or performed in visit on 10/13/21   Streptococcus A Rapid Screen w/Reflex to PCR     Status: Normal    Specimen: Throat; Swab   Result Value Ref Range    Group A Strep antigen Negative Negative

## 2021-10-14 NOTE — PATIENT INSTRUCTIONS
We will call tomorrow if the strep confirmation test is positive. Isolate until we know COVID test is negative.

## 2021-10-23 ENCOUNTER — HEALTH MAINTENANCE LETTER (OUTPATIENT)
Age: 36
End: 2021-10-23

## 2022-01-04 DIAGNOSIS — F41.9 ANXIETY: ICD-10-CM

## 2022-01-05 RX ORDER — BUPROPION HYDROCHLORIDE 150 MG/1
TABLET ORAL
Qty: 30 TABLET | Refills: 0 | OUTPATIENT
Start: 2022-01-05

## 2022-01-05 NOTE — TELEPHONE ENCOUNTER
Routing refill request to provider for review/approval because:  A break in medication-no follow up, #30 with one refill sent  7/5/21  Madalyn López RN, BSN  [facility]

## 2022-01-05 NOTE — TELEPHONE ENCOUNTER
Please call Jonelle and ask her to set up a clinic appt with me last seen by me in clinic 2019, looks like a break in med (see below) no refill given.  Thanks,  Susan Haase, CNP

## 2022-01-06 NOTE — TELEPHONE ENCOUNTER
Message #1 left for patient to return call to [facility] team     Ritu Adams, Registered Nurse  [facility]

## 2022-01-10 NOTE — TELEPHONE ENCOUNTER
RN received call from patient (transferred incorrectly by scheduling)     Advised visit needed for refill - scheduled for this week in same day spot as patient has only 4 tablets left     Unable to get into my chart - given my chart help line number     Ritu Adams Registered Nurse  [facility]

## 2022-01-13 ENCOUNTER — OFFICE VISIT (OUTPATIENT)
Dept: FAMILY MEDICINE | Facility: CLINIC | Age: 37
End: 2022-01-13
Payer: COMMERCIAL

## 2022-01-13 VITALS
HEART RATE: 84 BPM | SYSTOLIC BLOOD PRESSURE: 122 MMHG | OXYGEN SATURATION: 98 % | TEMPERATURE: 99.1 F | DIASTOLIC BLOOD PRESSURE: 80 MMHG | BODY MASS INDEX: 25.58 KG/M2 | WEIGHT: 144.4 LBS

## 2022-01-13 DIAGNOSIS — J45.20 MILD INTERMITTENT ASTHMA WITHOUT COMPLICATION: ICD-10-CM

## 2022-01-13 DIAGNOSIS — F41.9 ANXIETY: Primary | ICD-10-CM

## 2022-01-13 PROCEDURE — 99214 OFFICE O/P EST MOD 30 MIN: CPT | Performed by: NURSE PRACTITIONER

## 2022-01-13 RX ORDER — SERTRALINE HYDROCHLORIDE 100 MG/1
50 TABLET, FILM COATED ORAL DAILY
Qty: 45 TABLET | Refills: 1 | Status: SHIPPED | OUTPATIENT
Start: 2022-01-13 | End: 2022-10-14

## 2022-01-13 RX ORDER — ALBUTEROL SULFATE 90 UG/1
2 AEROSOL, METERED RESPIRATORY (INHALATION) EVERY 6 HOURS
Qty: 18 G | Refills: 1 | Status: SHIPPED | OUTPATIENT
Start: 2022-01-13 | End: 2024-04-28

## 2022-01-13 RX ORDER — BUPROPION HYDROCHLORIDE 150 MG/1
150 TABLET ORAL EVERY MORNING
Qty: 90 TABLET | Refills: 1 | Status: SHIPPED | OUTPATIENT
Start: 2022-01-13 | End: 2022-07-26

## 2022-01-13 ASSESSMENT — ANXIETY QUESTIONNAIRES
1. FEELING NERVOUS, ANXIOUS, OR ON EDGE: NOT AT ALL
7. FEELING AFRAID AS IF SOMETHING AWFUL MIGHT HAPPEN: NOT AT ALL
GAD7 TOTAL SCORE: 0
GAD7 TOTAL SCORE: 0
3. WORRYING TOO MUCH ABOUT DIFFERENT THINGS: NOT AT ALL
4. TROUBLE RELAXING: NOT AT ALL
GAD7 TOTAL SCORE: 0
6. BECOMING EASILY ANNOYED OR IRRITABLE: NOT AT ALL
2. NOT BEING ABLE TO STOP OR CONTROL WORRYING: NOT AT ALL
5. BEING SO RESTLESS THAT IT IS HARD TO SIT STILL: NOT AT ALL
7. FEELING AFRAID AS IF SOMETHING AWFUL MIGHT HAPPEN: NOT AT ALL

## 2022-01-13 ASSESSMENT — PATIENT HEALTH QUESTIONNAIRE - PHQ9
SUM OF ALL RESPONSES TO PHQ QUESTIONS 1-9: 5
SUM OF ALL RESPONSES TO PHQ QUESTIONS 1-9: 5
10. IF YOU CHECKED OFF ANY PROBLEMS, HOW DIFFICULT HAVE THESE PROBLEMS MADE IT FOR YOU TO DO YOUR WORK, TAKE CARE OF THINGS AT HOME, OR GET ALONG WITH OTHER PEOPLE: NOT DIFFICULT AT ALL

## 2022-01-13 NOTE — PROGRESS NOTES
Assessment & Plan     Mild intermittent asthma without complication:ACT of 23, well controlled, rarely uses albuterol   - albuterol (PROAIR HFA/PROVENTIL HFA/VENTOLIN HFA) 108 (90 Base) MCG/ACT inhaler  Dispense: 18 g; Refill: 1    AnxietyPHQ 9 of 5, denies thoughts of harming self or others. LUIS FELIPE 7 of 0.  Sleeping well at night.  Continue on bupropion and sertraline as prescribed.  Is aware of need to follow up if symptoms worsen, is aware of emergency resources.   - buPROPion (WELLBUTRIN XL) 150 MG 24 hr tablet  Dispense: 90 tablet; Refill: 1  - sertraline (ZOLOFT) 100 MG tablet  Dispense: 45 tablet; Refill: 1      Return in about 1 year (around 1/13/2023) for Physical Exam.    Susan Haase, APRN [facility]    Shayne Sal is a 36 year old who presents for the following health issues     History of Present Illness       Mental Health Follow-up:  Patient presents to follow-up on Depression & Anxiety.Patient's depression since last visit has been:  Better  The patient is not having other symptoms associated with depression.  Patient's anxiety since last visit has been:  Better  The patient is having other symptoms associated with anxiety.  Any significant life events: No  Patient is not feeling anxious or having panic attacks.  Patient has no concerns about alcohol or drug use.     Social History  Tobacco Use    Smoking status: Former Smoker      Types: Vaping Device    Smokeless tobacco: Never Used    Tobacco comment: occasional  Alcohol use: Not Currently    Alcohol/week: 1.0 standard drink    Types: 1 Cans of beer per week  Drug use: Yes    Types: Marijuana    Comment: once per day through duration of pregnancy      Today's PHQ-9         PHQ-9 Total Score:     (P) 5   PHQ-9 Q9 Thoughts of better off dead/self-harm past 2 weeks :   (P) Not at all   Thoughts of suicide or self harm:      Self-harm Plan:        Self-harm Action:          Safety concerns for self or others:           Answers for HPI/ROS submitted by the patient on 1/13/2022  If you checked off any problems, how difficult have these problems made it for you to do your work, take care of things at home, or get along with other people?: Not difficult at all  PHQ9 TOTAL SCORE: 5  LUIS FELIPE 7 TOTAL SCORE: 0  PHQ 9 of 5, denies thoughts of harming self or others. LUIS FELIPE 7 of 0.  Sleeping well at night.    Asthma:  ACT of 23, triggered by cold air.         Review of Systems   CONSTITUTIONAL: NEGATIVE for fever, chills, change in weight  ENT/MOUTH: NEGATIVE for ear, mouth and throat problems  RESP: NEGATIVE for significant cough or SOB  CV: NEGATIVE for chest pain, palpitations or peripheral edema  PSYCHIATRIC: see HPI      Objective    /80 (BP Location: Right arm, Patient Position: Sitting, Cuff Size: Adult Regular)   Pulse 84   Temp 99.1  F (37.3  C) (Oral)   Wt 65.5 kg (144 lb 6.4 oz)   SpO2 98%   BMI 25.58 kg/m    Body mass index is 25.58 kg/m .  Physical Exam   GENERAL: healthy, alert and no distress  HENT: ear canals and TM's normal, nose and mouth without ulcers or lesions  NECK: no adenopathy, no asymmetry, masses, or scars and thyroid normal to palpation  RESP: lungs clear to auscultation - no rales, rhonchi or wheezes  CV: regular rate and rhythm, normal S1 S2,   PSYCH: mentation appears normal, affect normal/bright

## 2022-01-14 ASSESSMENT — ANXIETY QUESTIONNAIRES: GAD7 TOTAL SCORE: 0

## 2022-01-14 ASSESSMENT — ASTHMA QUESTIONNAIRES: ACT_TOTALSCORE: 20

## 2022-01-14 ASSESSMENT — PATIENT HEALTH QUESTIONNAIRE - PHQ9: SUM OF ALL RESPONSES TO PHQ QUESTIONS 1-9: 5

## 2022-02-12 ENCOUNTER — HEALTH MAINTENANCE LETTER (OUTPATIENT)
Age: 37
End: 2022-02-12

## 2022-02-18 ENCOUNTER — VIRTUAL VISIT (OUTPATIENT)
Dept: PEDIATRICS | Facility: CLINIC | Age: 37
End: 2022-02-18
Payer: COMMERCIAL

## 2022-02-18 DIAGNOSIS — R50.9 FEVER, UNSPECIFIED FEVER CAUSE: Primary | ICD-10-CM

## 2022-02-18 PROCEDURE — 87804 INFLUENZA ASSAY W/OPTIC: CPT | Performed by: PHYSICIAN ASSISTANT

## 2022-02-18 PROCEDURE — 99213 OFFICE O/P EST LOW 20 MIN: CPT | Mod: 95 | Performed by: PHYSICIAN ASSISTANT

## 2022-02-18 NOTE — PROGRESS NOTES
Jonelle is a 36 year old who is being evaluated via a billable telephone visit:     Assessment & Plan     Fever, unspecified fever cause  Proceed with cultures. Quarantine in meantime.  - Symptomatic; Unknown COVID-19 Virus (Coronavirus) by PCR Nose; Future  - Influenza A/B antigen  - Streptococcus A Rapid Scr w Reflx to PCR - Lab Collect; Future    ZURDO Rios [facility] ENRIQUETA    Subjective   Jonelle is a 36 year old who presents for the following health issues:    HPI   URI symptoms x one week. ST constant. Nasal congestion. Headache.   Fevers 100. Chills and body aches. X 2 days.   Cough minimal. No wheezing. Chest tightness. History of asthma.   Diarrhea--green x one month. Abdominal pain-chronic.   No vomiting.     No covid, influenza, strep exposures.   Stays at home with kids--no . 8 year old in school. Kids at feeling fine.     covid vaccine last week x 1.     Review of Systems   Constitutional, HEENT, cardiovascular, pulmonary, gi and gu systems are negative, except as otherwise noted.      Objective         Vitals:  No vitals were obtained today due to virtual visit.    Physical Exam   GENERAL: Healthy, alert and no distress    Phone duration: 9 minutes

## 2022-02-19 ENCOUNTER — LAB (OUTPATIENT)
Dept: URGENT CARE | Facility: URGENT CARE | Age: 37
End: 2022-02-19
Attending: PHYSICIAN ASSISTANT
Payer: COMMERCIAL

## 2022-02-19 DIAGNOSIS — R50.9 FEVER, UNSPECIFIED FEVER CAUSE: ICD-10-CM

## 2022-02-19 LAB
DEPRECATED S PYO AG THROAT QL EIA: NEGATIVE
FLUAV AG SPEC QL IA: NEGATIVE
FLUBV AG SPEC QL IA: NEGATIVE
SARS-COV-2 RNA RESP QL NAA+PROBE: NEGATIVE

## 2022-02-19 PROCEDURE — U0005 INFEC AGEN DETEC AMPLI PROBE: HCPCS

## 2022-02-19 PROCEDURE — U0003 INFECTIOUS AGENT DETECTION BY NUCLEIC ACID (DNA OR RNA); SEVERE ACUTE RESPIRATORY SYNDROME CORONAVIRUS 2 (SARS-COV-2) (CORONAVIRUS DISEASE [COVID-19]), AMPLIFIED PROBE TECHNIQUE, MAKING USE OF HIGH THROUGHPUT TECHNOLOGIES AS DESCRIBED BY CMS-2020-01-R: HCPCS

## 2022-02-19 PROCEDURE — 87651 STREP A DNA AMP PROBE: CPT

## 2022-02-20 LAB — GROUP A STREP BY PCR: NOT DETECTED

## 2022-02-21 ENCOUNTER — MYC MEDICAL ADVICE (OUTPATIENT)
Dept: PEDIATRICS | Facility: CLINIC | Age: 37
End: 2022-02-21
Payer: COMMERCIAL

## 2022-02-21 NOTE — TELEPHONE ENCOUNTER
Call patient.   I don't have a clear pic of what is happening. I would like for her to have her joints evaluated, abd pain/diarrhea evaluated. She needs an in person visit and we'll be able to obtain labs at that time too.   Cheli Grimaldo has openings tomorrow.   Alise Wilkinson PA-C

## 2022-02-24 ENCOUNTER — OFFICE VISIT (OUTPATIENT)
Dept: PEDIATRICS | Facility: CLINIC | Age: 37
End: 2022-02-24
Payer: COMMERCIAL

## 2022-02-24 VITALS
OXYGEN SATURATION: 99 % | TEMPERATURE: 98.4 F | SYSTOLIC BLOOD PRESSURE: 118 MMHG | DIASTOLIC BLOOD PRESSURE: 76 MMHG | RESPIRATION RATE: 16 BRPM | HEART RATE: 93 BPM

## 2022-02-24 DIAGNOSIS — J45.901 EXACERBATION OF ASTHMA, UNSPECIFIED ASTHMA SEVERITY, UNSPECIFIED WHETHER PERSISTENT: ICD-10-CM

## 2022-02-24 DIAGNOSIS — M79.10 MUSCLE ACHE: ICD-10-CM

## 2022-02-24 DIAGNOSIS — M25.50 MULTIPLE JOINT PAIN: ICD-10-CM

## 2022-02-24 DIAGNOSIS — R19.5 LOOSE STOOLS: ICD-10-CM

## 2022-02-24 DIAGNOSIS — J01.90 ACUTE SINUSITIS WITH SYMPTOMS > 10 DAYS: Primary | ICD-10-CM

## 2022-02-24 PROCEDURE — 99214 OFFICE O/P EST MOD 30 MIN: CPT | Performed by: NURSE PRACTITIONER

## 2022-02-24 NOTE — PROGRESS NOTES
Assessment & Plan     Acute sinusitis with symptoms > 10 days  Begin abx. Also recommend flonase.  - amoxicillin-clavulanate (AUGMENTIN) 875-125 MG tablet; Take 1 tablet by mouth 2 times daily for 10 days    Muscle ache  Multiple joint pain  Primarily wrists and knees. No swelling, redness or point tenderness. Will hold on labs but if persisting after abx to follow-up.    Exacerbation of asthma, unspecified asthma severity, unspecified whether persistent  Recommend increase use of albuterol. No wheeze or dyspnea.    Loose stools  Abx could exacerbate this. No watery stools. No fevers. Begin probiotic. Follow-up if persisting.       Patient Instructions   -Start a probiotic  I would recommend starting a probiotic with 10-30 billion colony forming units daily to see if that helps.  Our pharmacy carries some of these.    -Antibiotic twice daily for 10 days (take with food!)    -Use your albuterol inhaler scheduled every 4 -6 hours for the next 2-3 days, then use every 4-6 hours as needed for cough, wheeze, or shortness of breath.    -Nasal spray called flonase (fluticasone is generic)    -Follow up if knee and wrist pain persists        Return in about 1 week (around 3/3/2022), or if symptoms worsen or fail to improve.    Cheli Grimaldo NP  [facility] ENRIQUETA Sal is a 36 year old who presents for the following health issues    HPI   Acute Illness also discuss joint pain per Cami, see previous notes.  Acute illness concerns: URI  Onset/Duration: over one month  Symptoms:  Fever: not sure but did  Chills/Sweats: no  Headache (location?): YES  Sinus Pressure: YES  Conjunctivitis:  no  Ear Pain: no  Rhinorrhea: no  Congestion: no  Sore Throat: YES  Cough: YES-non-productive  Wheeze: no  Decreased Appetite: no  Nausea: no  Vomiting: no  Diarrhea: YES  Dysuria/Freq.: no  Dysuria or Hematuria: no  Fatigue/Achiness: YES  Sick/Strep Exposure: no  Therapies tried and outcome: Tylenol-helps  but symptoms not going away    2/18: VRT done for fever. Neg COVID, flu and strep  Some good days, some bad the past month  No longer having fevers  Sinus pressure and headache  Joint pain-muscle pain. B/l knees and wrist. Hx of carpal tunnel but feels different.  Tired  Dental visit yesterday and normal    Hx of asthma-some tightness with activity  Looser/soft stools and green stools. No blood or mucus Some cramping with this.      Review of Systems   Constitutional, HEENT, cardiovascular, pulmonary, gi and gu systems are negative, except as otherwise noted.      Objective    /76   Pulse 93   Temp 98.4  F (36.9  C) (Oral)   Resp 16   SpO2 99%   There is no height or weight on file to calculate BMI.  Physical Exam   GENERAL: healthy, alert and no distress  EYES: Eyes grossly normal to inspection  HENT: ear canals and TM's normal, nose and mouth without ulcers or lesions. B/l maxillary sinus tenderness  NECK: no adenopathy  RESP: lungs clear to auscultation - no rales, rhonchi or wheezes. Breathing nonlabored.  CV: regular rate and rhythm, normal S1 S2, no S3 or S4, no murmur, click or rub  SKIN: no suspicious lesions or rashes  NEURO: Normal strength and tone, mentation intact and speech normal  PSYCH: mentation appears normal, affect normal/bright    No results found for this or any previous visit (from the past 24 hour(s)).  No results found for this visit on 02/24/22.

## 2022-02-24 NOTE — PATIENT INSTRUCTIONS
-Start a probiotic  I would recommend starting a probiotic with 10-30 billion colony forming units daily to see if that helps.  Our pharmacy carries some of these.    -Antibiotic twice daily for 10 days (take with food!)    -Use your albuterol inhaler scheduled every 4 -6 hours for the next 2-3 days, then use every 4-6 hours as needed for cough, wheeze, or shortness of breath.    -Nasal spray called flonase (fluticasone is generic)    -Follow up if knee and wrist pain persists

## 2022-03-18 ENCOUNTER — HOSPITAL ENCOUNTER (EMERGENCY)
Facility: CLINIC | Age: 37
Discharge: LEFT BEFORE DISCHARGE | End: 2022-03-18
Attending: EMERGENCY MEDICINE | Admitting: EMERGENCY MEDICINE
Payer: COMMERCIAL

## 2022-03-18 VITALS
DIASTOLIC BLOOD PRESSURE: 90 MMHG | RESPIRATION RATE: 16 BRPM | OXYGEN SATURATION: 98 % | TEMPERATURE: 99.6 F | HEART RATE: 100 BPM | SYSTOLIC BLOOD PRESSURE: 126 MMHG

## 2022-03-18 PROCEDURE — 999N000104 HC STATISTIC NO CHARGE

## 2022-03-18 PROCEDURE — 93005 ELECTROCARDIOGRAM TRACING: CPT

## 2022-03-18 NOTE — ED TRIAGE NOTES
Pt presents to ED with c/o chest pain that starts in her midsternal area and wraps around to her back. Pain started over an hour ago. Pt also feels like her heart is racing. Pt states that she has had panic attacks before, but this feels different. ABC intact. A/O x4.

## 2022-03-18 NOTE — ED NOTES
Signed up for patient when name moved from triage into room on trackboard.  Then moved from room status to BENIGNO.  I did not see this patient.         Acacia Law MD  03/18/22 [id number]

## 2022-03-21 LAB
ATRIAL RATE - MUSE: 85 BPM
DIASTOLIC BLOOD PRESSURE - MUSE: NORMAL MMHG
INTERPRETATION ECG - MUSE: NORMAL
P AXIS - MUSE: 54 DEGREES
PR INTERVAL - MUSE: 148 MS
QRS DURATION - MUSE: 78 MS
QT - MUSE: 352 MS
QTC - MUSE: 418 MS
R AXIS - MUSE: 84 DEGREES
SYSTOLIC BLOOD PRESSURE - MUSE: NORMAL MMHG
T AXIS - MUSE: 59 DEGREES
VENTRICULAR RATE- MUSE: 85 BPM

## 2022-03-24 ENCOUNTER — ANCILLARY PROCEDURE (OUTPATIENT)
Dept: GENERAL RADIOLOGY | Facility: CLINIC | Age: 37
End: 2022-03-24
Attending: NURSE PRACTITIONER
Payer: COMMERCIAL

## 2022-03-24 ENCOUNTER — OFFICE VISIT (OUTPATIENT)
Dept: PEDIATRICS | Facility: CLINIC | Age: 37
End: 2022-03-24
Payer: COMMERCIAL

## 2022-03-24 VITALS
HEIGHT: 63 IN | SYSTOLIC BLOOD PRESSURE: 102 MMHG | OXYGEN SATURATION: 98 % | WEIGHT: 156.4 LBS | BODY MASS INDEX: 27.71 KG/M2 | RESPIRATION RATE: 14 BRPM | HEART RATE: 80 BPM | TEMPERATURE: 98.5 F | DIASTOLIC BLOOD PRESSURE: 62 MMHG

## 2022-03-24 DIAGNOSIS — J45.20 MILD INTERMITTENT ASTHMA WITHOUT COMPLICATION: ICD-10-CM

## 2022-03-24 DIAGNOSIS — R07.81 RIB PAIN: ICD-10-CM

## 2022-03-24 DIAGNOSIS — R53.83 FATIGUE, UNSPECIFIED TYPE: ICD-10-CM

## 2022-03-24 DIAGNOSIS — M79.10 MUSCLE ACHE: ICD-10-CM

## 2022-03-24 DIAGNOSIS — G43.009 MIGRAINE WITHOUT AURA AND WITHOUT STATUS MIGRAINOSUS, NOT INTRACTABLE: ICD-10-CM

## 2022-03-24 DIAGNOSIS — M25.50 MULTIPLE JOINT PAIN: ICD-10-CM

## 2022-03-24 DIAGNOSIS — R19.5 LOOSE STOOLS: ICD-10-CM

## 2022-03-24 DIAGNOSIS — F41.9 ANXIETY: ICD-10-CM

## 2022-03-24 DIAGNOSIS — M79.10 MUSCLE ACHE: Primary | ICD-10-CM

## 2022-03-24 DIAGNOSIS — J34.89 PAIN OF MAXILLARY SINUS: ICD-10-CM

## 2022-03-24 LAB
BASOPHILS # BLD AUTO: 0.1 10E3/UL (ref 0–0.2)
BASOPHILS NFR BLD AUTO: 1 %
EOSINOPHIL # BLD AUTO: 0.2 10E3/UL (ref 0–0.7)
EOSINOPHIL NFR BLD AUTO: 4 %
ERYTHROCYTE [DISTWIDTH] IN BLOOD BY AUTOMATED COUNT: 11.9 % (ref 10–15)
ERYTHROCYTE [SEDIMENTATION RATE] IN BLOOD BY WESTERGREN METHOD: 9 MM/HR (ref 0–20)
HCT VFR BLD AUTO: 39.6 % (ref 35–47)
HGB BLD-MCNC: 12.9 G/DL (ref 11.7–15.7)
LYMPHOCYTES # BLD AUTO: 2.5 10E3/UL (ref 0.8–5.3)
LYMPHOCYTES NFR BLD AUTO: 41 %
MCH RBC QN AUTO: 30.6 PG (ref 26.5–33)
MCHC RBC AUTO-ENTMCNC: 32.6 G/DL (ref 31.5–36.5)
MCV RBC AUTO: 94 FL (ref 78–100)
MONOCYTES # BLD AUTO: 0.4 10E3/UL (ref 0–1.3)
MONOCYTES NFR BLD AUTO: 7 %
NEUTROPHILS # BLD AUTO: 3 10E3/UL (ref 1.6–8.3)
NEUTROPHILS NFR BLD AUTO: 48 %
PLATELET # BLD AUTO: 211 10E3/UL (ref 150–450)
RBC # BLD AUTO: 4.21 10E6/UL (ref 3.8–5.2)
WBC # BLD AUTO: 6.2 10E3/UL (ref 4–11)

## 2022-03-24 PROCEDURE — 85652 RBC SED RATE AUTOMATED: CPT | Performed by: NURSE PRACTITIONER

## 2022-03-24 PROCEDURE — 99214 OFFICE O/P EST MOD 30 MIN: CPT | Performed by: NURSE PRACTITIONER

## 2022-03-24 PROCEDURE — 86140 C-REACTIVE PROTEIN: CPT | Performed by: NURSE PRACTITIONER

## 2022-03-24 PROCEDURE — 36415 COLL VENOUS BLD VENIPUNCTURE: CPT | Performed by: NURSE PRACTITIONER

## 2022-03-24 PROCEDURE — 71046 X-RAY EXAM CHEST 2 VIEWS: CPT | Performed by: RADIOLOGY

## 2022-03-24 PROCEDURE — 82550 ASSAY OF CK (CPK): CPT | Performed by: NURSE PRACTITIONER

## 2022-03-24 PROCEDURE — 80050 GENERAL HEALTH PANEL: CPT | Performed by: NURSE PRACTITIONER

## 2022-03-24 RX ORDER — FLUTICASONE PROPIONATE 50 MCG
1 SPRAY, SUSPENSION (ML) NASAL DAILY
Qty: 16 G | Refills: 1 | Status: SHIPPED | OUTPATIENT
Start: 2022-03-24 | End: 2022-10-14

## 2022-03-24 NOTE — PROGRESS NOTES
Assessment & Plan   Multiple joint pain  Muscle ache  Fatigue, unspecified type  Proceed with labs. Unsure of etiology at this point. Possibly viral as she initially had fever and URI symptoms but has since resolved. Hold on any rheum labs as she has no joint swelling/redness/tenderness. Denies chance of pregnancy. No muscle weakness.  - CBC with platelets and differential; Future  - CRP, inflammation; Future  - ESR: Erythrocyte sedimentation rate; Future  - Comprehensive metabolic panel (BMP + Alb, Alk Phos, ALT, AST, Total. Bili, TP); Future  - XR Chest 2 Views; Future  - TSH with free T4 reflex; Future  - CBC with platelets and differential  - CRP, inflammation  - ESR: Erythrocyte sedimentation rate  - Comprehensive metabolic panel (BMP + Alb, Alk Phos, ALT, AST, Total. Bili, TP)  - TSH with free T4 reflex  -UA  -CK    Loose stools  Now resolved    Rib pain  Reassuring recent normal EKG and CXR. Low suspicion for cardiopulm issues. Feel more likely MSK related, recommend trial of NSAIDs.  - XR Chest 2 Views; Future    Pain of maxillary sinus  Some improvement with abx, trial flonase and to see ENT if persisting.  - fluticasone (FLONASE) 50 MCG/ACT nasal spray; Spray 1 spray into both nostrils daily  - Otolaryngology Referral; Future    Migraine without aura and without status migrainosus, not intractable  No new/worsening symptoms, overall stable    Mild intermittent asthma without complication  Stable, rare albuterol use  - XR Chest 2 Views; Future    Anxiety  She has felt this has been worst lately        Patient Instructions   SINUS PAIN  -try flonase 1 spray each nostril for at least 2-3 weeks  -if not improving to see ENT (you would need to call and schedule this)    RIB PAIN  -let's do an xray   -if normal then I would recommend 1 week of regular use of ibuprofen 400-600 mg every 6-8 hours (3x per day)  -if you have chest pain or shortness of breath you should be seen right away    MUSCLE/JOINT  "PAIN  -labs today  -hold on the rheumatologic labs, we can do at next visit if this doesn't improve          Return in about 1 month (around 4/24/2022).    Cheli Grimaldo NP  [facility] ENRIQUETA Sal is a 36 year old who presents for the following health issues  accompanied by her child.    History of Present Illness       Reason for visit:  Follow up pain and sinus issues  Symptom onset:  More than a month  Symptoms include:  Body aches, headache, face/sinus pain, fatigue and chest discomfort  Symptom intensity:  Moderate  Symptom progression:  Staying the same  Had these symptoms before:  Yes  Has tried/received treatment for these symptoms:  Yes  Previous treatment was successful:  Yes  Prior treatment description:  Antibiotics  What makes it worse:  Moving and bending over  What makes it better:  Sleep and tylenol    She eats 2-3 servings of fruits and vegetables daily.She consumes 1 sweetened beverage(s) daily.She exercises with enough effort to increase her heart rate 30 to 60 minutes per day.  She exercises with enough effort to increase her heart rate 5 days per week.   She is taking medications regularly.     Patient here for follow-up of body ache problems - last seen by EBER Grimaldo on 2/24/22 for this issue. Patient states body aches got better for a few days but now are some level as before being seen.:    Patient was in [facility] ED on 3/18/22 - had EKG but was not seen by provider  EKG Normal.  Multiple issues     Rib pain  -no known injury or trauma  -b/l under ribs/breast  -denies abd pain, fever, chills, dyspnea, palpitations, reflux  -admits to worsening anxiety initially   -really bad last week, went to ER and left before visit but had normal EKG  -smoker  -hx of pneumonia  -sometimes sharp pain  -unsure what makes better/worst    Denies chance of pregnancy    Joint/muscle pain  -muscles feel \"heavy\"  -no weakness  -joint pain b/l wrists and knees  -denies swelling or " "redness of joints  -no known fam hx of rheum problems    Sinus pain  -augmentin helped temporarily and symptoms returned  -b/l maxillary pain with palpation  -denies rhinorrhea or congestion  -no hx of allergies    Also notes feeling fatigued  She is a smoker  Denies any chance of pregnancy    Review of Systems   Constitutional, HEENT, cardiovascular, pulmonary, GI, , musculoskeletal, neuro, skin, endocrine and psych systems are negative, except as otherwise noted.      Objective    /62 (BP Location: Right arm, Cuff Size: Adult Regular)   Pulse 80   Temp 98.5  F (36.9  C) (Tympanic)   Resp 14   Ht 1.6 m (5' 3\")   Wt 70.9 kg (156 lb 6.4 oz)   SpO2 98%   BMI 27.71 kg/m    Body mass index is 27.71 kg/m .  Physical Exam   GENERAL: healthy, alert and no distress  HENT: ear canals and TM's normal, nose and mouth without ulcers or lesions  NECK: no adenopathy, no asymmetry, masses, or scars and thyroid normal to palpation  RESP: lungs clear to auscultation - no rales, rhonchi or wheezes  CV: regular rate and rhythm, normal S1 S2, no S3 or S4, no murmur, click or rub.  CHEST: non-tender on palpation  MS: no gross musculoskeletal defects noted, no edema. Joints without swelling or redness. Non-tender to palpation to wrists and knees  SKIN: no suspicious lesions or rashes  PSYCH: mentation appears normal, affect normal/bright    Results for orders placed or performed in visit on 03/24/22 (from the past 24 hour(s))   CBC with platelets and differential    Narrative    The following orders were created for panel order CBC with platelets and differential.  Procedure                               Abnormality         Status                     ---------                               -----------         ------                     CBC with platelets and d...[[id number]]                      Final result                 Please view results for these tests on the individual orders.   ESR: Erythrocyte sedimentation rate "   Result Value Ref Range    Erythrocyte Sedimentation Rate 9 0 - 20 mm/hr   CBC with platelets and differential   Result Value Ref Range    WBC Count 6.2 4.0 - 11.0 10e3/uL    RBC Count 4.21 3.80 - 5.20 10e6/uL    Hemoglobin 12.9 11.7 - 15.7 g/dL    Hematocrit 39.6 35.0 - 47.0 %    MCV 94 78 - 100 fL    MCH 30.6 26.5 - 33.0 pg    MCHC 32.6 31.5 - 36.5 g/dL    RDW 11.9 10.0 - 15.0 %    Platelet Count 211 150 - 450 10e3/uL    % Neutrophils 48 %    % Lymphocytes 41 %    % Monocytes 7 %    % Eosinophils 4 %    % Basophils 1 %    Absolute Neutrophils 3.0 1.6 - 8.3 10e3/uL    Absolute Lymphocytes 2.5 0.8 - 5.3 10e3/uL    Absolute Monocytes 0.4 0.0 - 1.3 10e3/uL    Absolute Eosinophils 0.2 0.0 - 0.7 10e3/uL    Absolute Basophils 0.1 0.0 - 0.2 10e3/uL     XR Chest 2 Views    Result Date: 3/24/2022  EXAM: XR CHEST 2 VW LOCATION: [facility] DATE/TIME: 3/24/2022 6:05 PM INDICATION:  Rib pain, Mild intermittent asthma without complication, Muscle ache COMPARISON: None.     IMPRESSION: Negative chest. No rib fractures are identified.

## 2022-03-24 NOTE — PATIENT INSTRUCTIONS
SINUS PAIN  -try flonase 1 spray each nostril for at least 2-3 weeks  -if not improving to see ENT (you would need to call and schedule this)    RIB PAIN  -let's do an xray   -if normal then I would recommend 1 week of regular use of ibuprofen 400-600 mg every 6-8 hours (3x per day)  -if you have chest pain or shortness of breath you should be seen right away    MUSCLE/JOINT PAIN  -labs today  -hold on the rheumatologic labs, we can do at next visit if this doesn't improve

## 2022-03-25 LAB
ALBUMIN SERPL-MCNC: 3.4 G/DL (ref 3.4–5)
ALP SERPL-CCNC: 52 U/L (ref 40–150)
ALT SERPL W P-5'-P-CCNC: 19 U/L (ref 0–50)
ANION GAP SERPL CALCULATED.3IONS-SCNC: 8 MMOL/L (ref 3–14)
AST SERPL W P-5'-P-CCNC: 16 U/L (ref 0–45)
BILIRUB SERPL-MCNC: 0.2 MG/DL (ref 0.2–1.3)
BUN SERPL-MCNC: 10 MG/DL (ref 7–30)
CALCIUM SERPL-MCNC: 9.3 MG/DL (ref 8.5–10.1)
CHLORIDE BLD-SCNC: 108 MMOL/L (ref 94–109)
CK SERPL-CCNC: 38 U/L (ref 30–225)
CO2 SERPL-SCNC: 24 MMOL/L (ref 20–32)
CREAT SERPL-MCNC: 0.74 MG/DL (ref 0.52–1.04)
CRP SERPL-MCNC: <2.9 MG/L (ref 0–8)
GFR SERPL CREATININE-BSD FRML MDRD: >90 ML/MIN/1.73M2
GLUCOSE BLD-MCNC: 105 MG/DL (ref 70–99)
POTASSIUM BLD-SCNC: 4.4 MMOL/L (ref 3.4–5.3)
PROT SERPL-MCNC: 6.9 G/DL (ref 6.8–8.8)
SODIUM SERPL-SCNC: 140 MMOL/L (ref 133–144)
TSH SERPL DL<=0.005 MIU/L-ACNC: 2.5 MU/L (ref 0.4–4)

## 2022-07-23 DIAGNOSIS — F41.9 ANXIETY: ICD-10-CM

## 2022-07-26 RX ORDER — BUPROPION HYDROCHLORIDE 150 MG/1
150 TABLET ORAL EVERY MORNING
Qty: 90 TABLET | Refills: 0 | Status: SHIPPED | OUTPATIENT
Start: 2022-07-26 | End: 2022-10-14

## 2022-07-26 NOTE — TELEPHONE ENCOUNTER
Patient needs to establish care with new PCP. Please assist in scheduling. Prescription approved per [facility] Refill Protocol.  Dakota ARANA RN

## 2022-08-03 ENCOUNTER — APPOINTMENT (OUTPATIENT)
Dept: GENERAL RADIOLOGY | Facility: CLINIC | Age: 37
End: 2022-08-03
Attending: EMERGENCY MEDICINE
Payer: COMMERCIAL

## 2022-08-03 ENCOUNTER — HOSPITAL ENCOUNTER (EMERGENCY)
Facility: CLINIC | Age: 37
Discharge: HOME | End: 2022-08-03
Attending: EMERGENCY MEDICINE | Admitting: EMERGENCY MEDICINE
Payer: COMMERCIAL

## 2022-08-03 VITALS
HEIGHT: 63 IN | HEART RATE: 93 BPM | DIASTOLIC BLOOD PRESSURE: 74 MMHG | TEMPERATURE: 99.5 F | RESPIRATION RATE: 20 BRPM | BODY MASS INDEX: 26.72 KG/M2 | SYSTOLIC BLOOD PRESSURE: 107 MMHG | WEIGHT: 150.79 LBS | OXYGEN SATURATION: 97 %

## 2022-08-03 DIAGNOSIS — U07.1 INFECTION DUE TO 2019 NOVEL CORONAVIRUS: ICD-10-CM

## 2022-08-03 LAB
ANION GAP SERPL CALCULATED.3IONS-SCNC: 12 MMOL/L (ref 7–15)
BASOPHILS # BLD AUTO: 0.1 10E3/UL (ref 0–0.2)
BASOPHILS NFR BLD AUTO: 1 %
BUN SERPL-MCNC: 6 MG/DL (ref 6–20)
CALCIUM SERPL-MCNC: 9.4 MG/DL (ref 8.6–10)
CHLORIDE SERPL-SCNC: 104 MMOL/L (ref 98–107)
CREAT SERPL-MCNC: 0.62 MG/DL (ref 0.51–0.95)
DEPRECATED HCO3 PLAS-SCNC: 24 MMOL/L (ref 22–29)
EOSINOPHIL # BLD AUTO: 0 10E3/UL (ref 0–0.7)
EOSINOPHIL NFR BLD AUTO: 1 %
ERYTHROCYTE [DISTWIDTH] IN BLOOD BY AUTOMATED COUNT: 12.1 % (ref 10–15)
GFR SERPL CREATININE-BSD FRML MDRD: >90 ML/MIN/1.73M2
GLUCOSE SERPL-MCNC: 96 MG/DL (ref 70–99)
HCT VFR BLD AUTO: 43.8 % (ref 35–47)
HGB BLD-MCNC: 13.8 G/DL (ref 11.7–15.7)
IMM GRANULOCYTES # BLD: 0 10E3/UL
IMM GRANULOCYTES NFR BLD: 0 %
LYMPHOCYTES # BLD AUTO: 0.4 10E3/UL (ref 0.8–5.3)
LYMPHOCYTES NFR BLD AUTO: 7 %
MCH RBC QN AUTO: 29.4 PG (ref 26.5–33)
MCHC RBC AUTO-ENTMCNC: 31.5 G/DL (ref 31.5–36.5)
MCV RBC AUTO: 93 FL (ref 78–100)
MONOCYTES # BLD AUTO: 0.5 10E3/UL (ref 0–1.3)
MONOCYTES NFR BLD AUTO: 8 %
NEUTROPHILS # BLD AUTO: 5.3 10E3/UL (ref 1.6–8.3)
NEUTROPHILS NFR BLD AUTO: 83 %
NRBC # BLD AUTO: 0 10E3/UL
NRBC BLD AUTO-RTO: 0 /100
PLATELET # BLD AUTO: 178 10E3/UL (ref 150–450)
POTASSIUM SERPL-SCNC: 4.3 MMOL/L (ref 3.4–5.3)
RBC # BLD AUTO: 4.69 10E6/UL (ref 3.8–5.2)
SODIUM SERPL-SCNC: 140 MMOL/L (ref 136–145)
WBC # BLD AUTO: 6.4 10E3/UL (ref 4–11)

## 2022-08-03 PROCEDURE — 250N000011 HC RX IP 250 OP 636: Performed by: EMERGENCY MEDICINE

## 2022-08-03 PROCEDURE — 36415 COLL VENOUS BLD VENIPUNCTURE: CPT | Performed by: EMERGENCY MEDICINE

## 2022-08-03 PROCEDURE — 96374 THER/PROPH/DIAG INJ IV PUSH: CPT

## 2022-08-03 PROCEDURE — 96361 HYDRATE IV INFUSION ADD-ON: CPT

## 2022-08-03 PROCEDURE — 80048 BASIC METABOLIC PNL TOTAL CA: CPT | Performed by: EMERGENCY MEDICINE

## 2022-08-03 PROCEDURE — 93005 ELECTROCARDIOGRAM TRACING: CPT

## 2022-08-03 PROCEDURE — 96375 TX/PRO/DX INJ NEW DRUG ADDON: CPT

## 2022-08-03 PROCEDURE — 258N000003 HC RX IP 258 OP 636: Performed by: EMERGENCY MEDICINE

## 2022-08-03 PROCEDURE — 71045 X-RAY EXAM CHEST 1 VIEW: CPT

## 2022-08-03 PROCEDURE — 85025 COMPLETE CBC W/AUTO DIFF WBC: CPT | Performed by: EMERGENCY MEDICINE

## 2022-08-03 PROCEDURE — 99285 EMERGENCY DEPT VISIT HI MDM: CPT | Mod: CS,25

## 2022-08-03 RX ORDER — PROCHLORPERAZINE MALEATE 10 MG
10 TABLET ORAL EVERY 6 HOURS PRN
Qty: 12 TABLET | Refills: 0 | Status: SHIPPED | OUTPATIENT
Start: 2022-08-03 | End: 2022-10-14

## 2022-08-03 RX ORDER — DIPHENHYDRAMINE HYDROCHLORIDE 50 MG/ML
25 INJECTION INTRAMUSCULAR; INTRAVENOUS ONCE
Status: COMPLETED | OUTPATIENT
Start: 2022-08-03 | End: 2022-08-03

## 2022-08-03 RX ORDER — KETOROLAC TROMETHAMINE 15 MG/ML
15 INJECTION, SOLUTION INTRAMUSCULAR; INTRAVENOUS ONCE
Status: COMPLETED | OUTPATIENT
Start: 2022-08-03 | End: 2022-08-03

## 2022-08-03 RX ADMIN — DIPHENHYDRAMINE HYDROCHLORIDE 25 MG: 50 INJECTION, SOLUTION INTRAMUSCULAR; INTRAVENOUS at 09:35

## 2022-08-03 RX ADMIN — KETOROLAC TROMETHAMINE 15 MG: 15 INJECTION, SOLUTION INTRAMUSCULAR; INTRAVENOUS at 09:35

## 2022-08-03 RX ADMIN — SODIUM CHLORIDE 1000 ML: 9 INJECTION, SOLUTION INTRAVENOUS at 09:35

## 2022-08-03 RX ADMIN — PROCHLORPERAZINE EDISYLATE 10 MG: 5 INJECTION INTRAMUSCULAR; INTRAVENOUS at 09:35

## 2022-08-03 ASSESSMENT — ENCOUNTER SYMPTOMS
HEADACHES: 1
NAUSEA: 1
SHORTNESS OF BREATH: 1
COUGH: 1
FATIGUE: 1
VOMITING: 1

## 2022-08-03 NOTE — ED TRIAGE NOTES
A&O x4.  ABC's intact.      Pt arrives with c/o vomiting for 7 hours and tested positive yesterday with home test. Having pain body, head, and throat. First covid symptoms of COVID started Monday.

## 2022-08-03 NOTE — ED NOTES
Pt states she attempted to take tylenol for her HA this am but vomited about 20 minutes later. In tears during rooming assessment, vomiting consistently overnight. Asking for ice chips and something for HA

## 2022-08-03 NOTE — DISCHARGE INSTRUCTIONS

## 2022-08-03 NOTE — ED PROVIDER NOTES
"  History     Chief Complaint:  Covid Concern     The history is provided by the patient.      Jonelle Khan is a 36 year old female with history of asthma, anxiety, and bipolar 2 disorder who presents with Covid concern. The patients states that she developed headache, cough, and body aches on Monday but developed nausea, vomiting, and shortness of breath last night. She adds that she vomited all night. She adds that she can't hold down water either. Patient tested positive for Covid yesterday.    Review of Systems   Constitutional: Positive for fatigue.   Respiratory: Positive for cough and shortness of breath.    Gastrointestinal: Positive for nausea and vomiting.   Neurological: Positive for headaches.   All other systems reviewed and are negative.    Allergies:  Ceclor [Cefaclor]    Medications:  Albuterol  Bupropion  Sertraline    Past Medical History:     Allergic rhinitis  Bipolar 2 disorder  Migraine without aura  Mild asthma  Anxiety  ONELIA  Vitamin D deficiency  S/P      Past Surgical History:     x2  Combined  and salpingectomy  Laparoscopic cholecystectomy  Chalazion removal under general     Family History:    Gallbladder disease-mother  Asthma, GI disease,    Social History:  Presents to ED via private vehicle.  Presents to ED alone.    Physical Exam     Patient Vitals for the past 24 hrs:   BP Temp Temp src Pulse Resp SpO2 Height Weight   22 1030 111/68 -- -- 96 -- 98 % -- --   22 1015 110/70 -- -- 86 -- 99 % -- --   22 1000 114/67 -- -- 89 -- 97 % -- --   22 0945 115/67 -- -- 83 -- 98 % -- --   22 0823 104/72 99.5  F (37.5  C) Oral 101 18 99 % 1.6 m (5' 3\") 68.4 kg (150 lb 12.7 oz)       Physical Exam  Constitutional: Alert, attentive  HENT:    Nose: Nose normal.    Mouth/Throat: Oropharynx is clear, mucous membranes are moist   Eyes: EOM are normal.   CV: tachycardic, regular rhythm; no murmurs, rubs or gallups  Chest: Effort normal and " breath sounds normal.   GI:  There is no tenderness. No distension. Normal bowel sounds  MSK: Normal range of motion.   Neurological: Alert, attentive  Skin: Skin is warm and dry.      Emergency Department Course   ECG  ECG results from 03/18/22   EKG 12-lead, tracing only     Value    Systolic Blood Pressure     Diastolic Blood Pressure     Ventricular Rate 85    Atrial Rate 85    ID Interval 148    QRS Duration 78        QTc 418    P Axis 54    R AXIS 84    T Axis 59    Interpretation ECG      Sinus rhythm  Normal ECG  When compared with ECG of 13-FEB-2021 16:25,  No significant change was found       Imaging:  XR Chest Port 1 View   Final Result   IMPRESSION: Negative chest. Lungs clear.      MOOKIE DUVAL MD            SYSTEM ID:  [id number]        Report per radiology    Laboratory:  Labs Ordered and Resulted from Time of ED Arrival to Time of ED Departure   CBC WITH PLATELETS AND DIFFERENTIAL - Abnormal       Result Value    WBC Count 6.4      RBC Count 4.69      Hemoglobin 13.8      Hematocrit 43.8      MCV 93      MCH 29.4      MCHC 31.5      RDW 12.1      Platelet Count 178      % Neutrophils 83      % Lymphocytes 7      % Monocytes 8      % Eosinophils 1      % Basophils 1      % Immature Granulocytes 0      NRBCs per 100 WBC 0      Absolute Neutrophils 5.3      Absolute Lymphocytes 0.4 (*)     Absolute Monocytes 0.5      Absolute Eosinophils 0.0      Absolute Basophils 0.1      Absolute Immature Granulocytes 0.0      Absolute NRBCs 0.0     BASIC METABOLIC PANEL - Normal    Creatinine 0.62      Sodium 140      Potassium 4.3      Urea Nitrogen 6.0      Chloride 104      Carbon Dioxide (CO2) 24      Anion Gap 12      Glucose 96      GFR Estimate >90      Calcium 9.4        Emergency Department Course:     Reviewed:  I reviewed nursing notes, vitals, past medical history and Care Everywhere    Assessments:  0850 I obtained history and examined the patient as noted above.   1120 I rechecked the patient  and explained findings.     Interventions:  0935 Prochlorperazine 10 mg IV  0935 Diphenhydramine 25 mg IV  0935 NS 1L IV  0935 Ketorolac 15 mg IV    Disposition:  The patient was discharged to home.     Impression & Plan     Medical Decision Making:  This is a 36-year-old female with history of asthma and recent COVID-positive test who presents for evaluation of symptoms consistent with COVID-19 as well as shortness of breath and vomiting.  She is mildly tachycardic initially but has otherwise normal stable vitals.  Screening labs are reassuring and chest x-ray shows no pneumonia or other acute process.  Oxygen levels have been normal.  Strongly doubt PE as she was recently diagnosed with COVID-19 and symptoms are consistent with the same.  Plan continued supportive cares at home, primary care follow-up, and return precautions for difficulty breathing, intractable vomiting, or any other concerns.      Diagnosis:    ICD-10-CM    1. Infection due to 2019 novel coronavirus  U07.1      Discharge Medications:  New Prescriptions    PROCHLORPERAZINE (COMPAZINE) 10 MG TABLET    Take 1 tablet (10 mg) by mouth every 6 hours as needed for nausea or vomiting     Scribe Disclosure:  Dionicio JACKSON, am serving as a scribe at 8:54 AM on 8/3/2022 to document services personally performed by Teja Lopez MD based on my observations and the provider's statements to me.          Teja Lopez MD  08/03/22 [id number]

## 2022-10-09 ENCOUNTER — HEALTH MAINTENANCE LETTER (OUTPATIENT)
Age: 37
End: 2022-10-09

## 2022-10-11 ENCOUNTER — HOSPITAL ENCOUNTER (EMERGENCY)
Facility: CLINIC | Age: 37
Discharge: HOME | End: 2022-10-11
Attending: EMERGENCY MEDICINE | Admitting: EMERGENCY MEDICINE
Payer: COMMERCIAL

## 2022-10-11 ENCOUNTER — APPOINTMENT (OUTPATIENT)
Dept: CT IMAGING | Facility: CLINIC | Age: 37
End: 2022-10-11
Attending: EMERGENCY MEDICINE
Payer: COMMERCIAL

## 2022-10-11 VITALS
TEMPERATURE: 99.2 F | OXYGEN SATURATION: 100 % | HEART RATE: 103 BPM | RESPIRATION RATE: 18 BRPM | DIASTOLIC BLOOD PRESSURE: 95 MMHG | SYSTOLIC BLOOD PRESSURE: 123 MMHG

## 2022-10-11 DIAGNOSIS — T74.21XA SEXUAL ASSAULT OF ADULT, INITIAL ENCOUNTER: ICD-10-CM

## 2022-10-11 DIAGNOSIS — R09.01: ICD-10-CM

## 2022-10-11 LAB
ANION GAP SERPL CALCULATED.3IONS-SCNC: 12 MMOL/L (ref 7–15)
BASOPHILS # BLD AUTO: 0.1 10E3/UL (ref 0–0.2)
BASOPHILS NFR BLD AUTO: 1 %
BUN SERPL-MCNC: 8.6 MG/DL (ref 6–20)
CALCIUM SERPL-MCNC: 10 MG/DL (ref 8.6–10)
CHLORIDE SERPL-SCNC: 106 MMOL/L (ref 98–107)
CREAT SERPL-MCNC: 0.68 MG/DL (ref 0.51–0.95)
DEPRECATED HCO3 PLAS-SCNC: 20 MMOL/L (ref 22–29)
EOSINOPHIL # BLD AUTO: 0.1 10E3/UL (ref 0–0.7)
EOSINOPHIL NFR BLD AUTO: 1 %
ERYTHROCYTE [DISTWIDTH] IN BLOOD BY AUTOMATED COUNT: 12.7 % (ref 10–15)
GFR SERPL CREATININE-BSD FRML MDRD: >90 ML/MIN/1.73M2
GLUCOSE SERPL-MCNC: 81 MG/DL (ref 70–99)
HCG SER QL IA.RAPID: NEGATIVE
HCT VFR BLD AUTO: 40.4 % (ref 35–47)
HGB BLD-MCNC: 13.4 G/DL (ref 11.7–15.7)
IMM GRANULOCYTES # BLD: 0 10E3/UL
IMM GRANULOCYTES NFR BLD: 0 %
LYMPHOCYTES # BLD AUTO: 2 10E3/UL (ref 0.8–5.3)
LYMPHOCYTES NFR BLD AUTO: 29 %
MCH RBC QN AUTO: 29.4 PG (ref 26.5–33)
MCHC RBC AUTO-ENTMCNC: 33.2 G/DL (ref 31.5–36.5)
MCV RBC AUTO: 89 FL (ref 78–100)
MONOCYTES # BLD AUTO: 0.5 10E3/UL (ref 0–1.3)
MONOCYTES NFR BLD AUTO: 7 %
NEUTROPHILS # BLD AUTO: 4.2 10E3/UL (ref 1.6–8.3)
NEUTROPHILS NFR BLD AUTO: 62 %
NRBC # BLD AUTO: 0 10E3/UL
NRBC BLD AUTO-RTO: 0 /100
PLATELET # BLD AUTO: 245 10E3/UL (ref 150–450)
POTASSIUM SERPL-SCNC: 3.6 MMOL/L (ref 3.4–5.3)
RBC # BLD AUTO: 4.56 10E6/UL (ref 3.8–5.2)
SODIUM SERPL-SCNC: 138 MMOL/L (ref 136–145)
WBC # BLD AUTO: 6.8 10E3/UL (ref 4–11)

## 2022-10-11 PROCEDURE — 82310 ASSAY OF CALCIUM: CPT | Performed by: EMERGENCY MEDICINE

## 2022-10-11 PROCEDURE — 250N000011 HC RX IP 250 OP 636: Performed by: EMERGENCY MEDICINE

## 2022-10-11 PROCEDURE — 99285 EMERGENCY DEPT VISIT HI MDM: CPT | Mod: 25

## 2022-10-11 PROCEDURE — 70496 CT ANGIOGRAPHY HEAD: CPT

## 2022-10-11 PROCEDURE — 36415 COLL VENOUS BLD VENIPUNCTURE: CPT | Performed by: EMERGENCY MEDICINE

## 2022-10-11 PROCEDURE — 70450 CT HEAD/BRAIN W/O DYE: CPT

## 2022-10-11 PROCEDURE — 70498 CT ANGIOGRAPHY NECK: CPT

## 2022-10-11 PROCEDURE — 96372 THER/PROPH/DIAG INJ SC/IM: CPT | Performed by: EMERGENCY MEDICINE

## 2022-10-11 PROCEDURE — 84702 CHORIONIC GONADOTROPIN TEST: CPT

## 2022-10-11 PROCEDURE — 250N000013 HC RX MED GY IP 250 OP 250 PS 637: Performed by: EMERGENCY MEDICINE

## 2022-10-11 PROCEDURE — 85004 AUTOMATED DIFF WBC COUNT: CPT | Performed by: EMERGENCY MEDICINE

## 2022-10-11 PROCEDURE — 250N000009 HC RX 250: Performed by: EMERGENCY MEDICINE

## 2022-10-11 RX ORDER — IOPAMIDOL 755 MG/ML
500 INJECTION, SOLUTION INTRAVASCULAR ONCE
Status: COMPLETED | OUTPATIENT
Start: 2022-10-11 | End: 2022-10-11

## 2022-10-11 RX ORDER — METRONIDAZOLE 500 MG/1
500 TABLET ORAL 2 TIMES DAILY
Qty: 14 TABLET | Refills: 1 | Status: SHIPPED | OUTPATIENT
Start: 2022-10-11 | End: 2022-10-14

## 2022-10-11 RX ORDER — LORAZEPAM 1 MG/1
1 TABLET ORAL ONCE
Status: COMPLETED | OUTPATIENT
Start: 2022-10-11 | End: 2022-10-11

## 2022-10-11 RX ORDER — AZITHROMYCIN 250 MG/1
2000 TABLET, FILM COATED ORAL ONCE
Status: COMPLETED | OUTPATIENT
Start: 2022-10-11 | End: 2022-10-11

## 2022-10-11 RX ORDER — ACETAMINOPHEN 325 MG/1
975 TABLET ORAL ONCE
Status: COMPLETED | OUTPATIENT
Start: 2022-10-11 | End: 2022-10-11

## 2022-10-11 RX ORDER — GENTAMICIN 40 MG/ML
240 INJECTION, SOLUTION INTRAMUSCULAR; INTRAVENOUS ONCE
Status: COMPLETED | OUTPATIENT
Start: 2022-10-11 | End: 2022-10-11

## 2022-10-11 RX ORDER — ONDANSETRON 4 MG/1
4 TABLET, ORALLY DISINTEGRATING ORAL EVERY 8 HOURS PRN
Qty: 10 TABLET | Refills: 0 | Status: SHIPPED | OUTPATIENT
Start: 2022-10-11 | End: 2022-10-14

## 2022-10-11 RX ORDER — IBUPROFEN 600 MG/1
600 TABLET, FILM COATED ORAL ONCE
Status: COMPLETED | OUTPATIENT
Start: 2022-10-11 | End: 2022-10-11

## 2022-10-11 RX ADMIN — GENTAMICIN SULFATE 240 MG: 40 INJECTION, SOLUTION INTRAMUSCULAR; INTRAVENOUS at 23:16

## 2022-10-11 RX ADMIN — SODIUM CHLORIDE 80 ML: 9 INJECTION, SOLUTION INTRAVENOUS at 21:54

## 2022-10-11 RX ADMIN — AZITHROMYCIN MONOHYDRATE 2000 MG: 250 TABLET ORAL at 23:24

## 2022-10-11 RX ADMIN — LORAZEPAM 1 MG: 1 TABLET ORAL at 19:13

## 2022-10-11 RX ADMIN — ULIPRISTAL ACETATE 30 MG: 30 TABLET ORAL at 23:25

## 2022-10-11 RX ADMIN — IBUPROFEN 600 MG: 600 TABLET ORAL at 19:13

## 2022-10-11 RX ADMIN — IOPAMIDOL 75 ML: 755 INJECTION, SOLUTION INTRAVENOUS at 21:54

## 2022-10-11 RX ADMIN — ACETAMINOPHEN 975 MG: 325 TABLET, FILM COATED ORAL at 19:13

## 2022-10-11 ASSESSMENT — ACTIVITIES OF DAILY LIVING (ADL)
ADLS_ACUITY_SCORE: 37

## 2022-10-11 ASSESSMENT — ENCOUNTER SYMPTOMS
SHORTNESS OF BREATH: 0
VOMITING: 0
HEADACHES: 0
DIARRHEA: 0
FEVER: 0
CHILLS: 0
DIZZINESS: 0
NAUSEA: 0
NECK PAIN: 1
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
## 2022-10-14 ENCOUNTER — OFFICE VISIT (OUTPATIENT)
Dept: FAMILY MEDICINE | Facility: CLINIC | Age: 37
End: 2022-10-14
Payer: COMMERCIAL

## 2022-10-14 VITALS
RESPIRATION RATE: 16 BRPM | TEMPERATURE: 97.8 F | HEIGHT: 64 IN | SYSTOLIC BLOOD PRESSURE: 122 MMHG | DIASTOLIC BLOOD PRESSURE: 74 MMHG | BODY MASS INDEX: 23.39 KG/M2 | HEART RATE: 94 BPM | OXYGEN SATURATION: 99 % | WEIGHT: 137 LBS

## 2022-10-14 DIAGNOSIS — T74.21XD SEXUAL ASSAULT OF ADULT, SUBSEQUENT ENCOUNTER: ICD-10-CM

## 2022-10-14 DIAGNOSIS — R21 RASH AND NONSPECIFIC SKIN ERUPTION: ICD-10-CM

## 2022-10-14 DIAGNOSIS — F41.0 PANIC ATTACK: ICD-10-CM

## 2022-10-14 DIAGNOSIS — M54.50 ACUTE BILATERAL LOW BACK PAIN WITHOUT SCIATICA: Primary | ICD-10-CM

## 2022-10-14 DIAGNOSIS — F41.9 ANXIETY: ICD-10-CM

## 2022-10-14 PROCEDURE — 99215 OFFICE O/P EST HI 40 MIN: CPT | Performed by: NURSE PRACTITIONER

## 2022-10-14 RX ORDER — CYCLOBENZAPRINE HCL 10 MG
10 TABLET ORAL 3 TIMES DAILY PRN
Qty: 30 TABLET | Refills: 0 | Status: SHIPPED | OUTPATIENT
Start: 2022-10-14 | End: 2022-10-24

## 2022-10-14 RX ORDER — PROPRANOLOL HYDROCHLORIDE 10 MG/1
10 TABLET ORAL 3 TIMES DAILY
Qty: 90 TABLET | Refills: 1 | Status: SHIPPED | OUTPATIENT
Start: 2022-10-14 | End: 2023-01-02

## 2022-10-14 RX ORDER — LORAZEPAM 0.5 MG/1
0.5 TABLET ORAL EVERY 6 HOURS PRN
Qty: 30 TABLET | Refills: 0 | Status: ON HOLD | OUTPATIENT
Start: 2022-10-14 | End: 2022-11-30

## 2022-10-14 RX ORDER — SERTRALINE HYDROCHLORIDE 100 MG/1
100 TABLET, FILM COATED ORAL DAILY
Qty: 90 TABLET | Refills: 3 | Status: ON HOLD
Start: 2022-10-14 | End: 2022-11-30

## 2022-10-14 RX ORDER — TRIAMCINOLONE ACETONIDE 1 MG/G
CREAM TOPICAL 2 TIMES DAILY
Qty: 30 G | Refills: 1 | Status: SHIPPED | OUTPATIENT
Start: 2022-10-14 | End: 2023-08-31

## 2022-10-14 ASSESSMENT — ANXIETY QUESTIONNAIRES
GAD7 TOTAL SCORE: 18
1. FEELING NERVOUS, ANXIOUS, OR ON EDGE: NEARLY EVERY DAY
GAD7 TOTAL SCORE: 18
6. BECOMING EASILY ANNOYED OR IRRITABLE: NEARLY EVERY DAY
3. WORRYING TOO MUCH ABOUT DIFFERENT THINGS: NOT AT ALL
5. BEING SO RESTLESS THAT IT IS HARD TO SIT STILL: NEARLY EVERY DAY
7. FEELING AFRAID AS IF SOMETHING AWFUL MIGHT HAPPEN: NEARLY EVERY DAY
IF YOU CHECKED OFF ANY PROBLEMS ON THIS QUESTIONNAIRE, HOW DIFFICULT HAVE THESE PROBLEMS MADE IT FOR YOU TO DO YOUR WORK, TAKE CARE OF THINGS AT HOME, OR GET ALONG WITH OTHER PEOPLE: EXTREMELY DIFFICULT
2. NOT BEING ABLE TO STOP OR CONTROL WORRYING: NEARLY EVERY DAY

## 2022-10-14 ASSESSMENT — PATIENT HEALTH QUESTIONNAIRE - PHQ9
SUM OF ALL RESPONSES TO PHQ QUESTIONS 1-9: 24
5. POOR APPETITE OR OVEREATING: NEARLY EVERY DAY
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
## 2022-10-14 NOTE — PATIENT INSTRUCTIONS
[facility]:    Dr. Willis-[facility]    Dr. Sheffield      For Rash:  Steroid cream prescribed. Daily Zyrtec(Certirizine) until improvement.     Will follow up with referral sources for Therapist.

## 2022-10-17 ENCOUNTER — TELEPHONE (OUTPATIENT)
Dept: FAMILY MEDICINE | Facility: CLINIC | Age: 37
End: 2022-10-17

## 2022-10-17 NOTE — TELEPHONE ENCOUNTER
Forms filled out for husbands FMLA. They would like a copy mailed to them as well.    Completed forms, placed in [facility] folder to return fax.    Kristal Saravia CNP

## 2022-11-12 DIAGNOSIS — M54.50 ACUTE BILATERAL LOW BACK PAIN WITHOUT SCIATICA: Primary | ICD-10-CM

## 2022-11-14 RX ORDER — CYCLOBENZAPRINE HCL 10 MG
TABLET ORAL
Qty: 30 TABLET | Refills: 0 | Status: ON HOLD | OUTPATIENT
Start: 2022-11-14 | End: 2022-11-30

## 2022-11-14 NOTE — TELEPHONE ENCOUNTER
Routing refill request to provider for review/approval because:  Drug not on the [facility] refill protocol   Lat filled 10/14/22 for 30 tablets  LOV:    10/14/22       CSA -- Patient Level:    CSA: None found at the patient level.       [employer] message sent to patient reminding of follow up recommendation for 11/14/22      [employer] RN [place] Triage

## 2022-11-23 ENCOUNTER — TELEPHONE (OUTPATIENT)
Dept: BEHAVIORAL HEALTH | Facility: CLINIC | Age: 37
End: 2022-11-23

## 2022-11-23 ENCOUNTER — TELEPHONE (OUTPATIENT)
Dept: FAMILY MEDICINE | Facility: CLINIC | Age: 37
End: 2022-11-23

## 2022-11-23 ENCOUNTER — HOSPITAL ENCOUNTER (EMERGENCY)
Facility: CLINIC | Age: 37
Discharge: OTHER INSTITUTION | End: 2022-11-24
Attending: EMERGENCY MEDICINE | Admitting: EMERGENCY MEDICINE
Payer: COMMERCIAL

## 2022-11-23 VITALS
HEART RATE: 85 BPM | TEMPERATURE: 98.2 F | SYSTOLIC BLOOD PRESSURE: 135 MMHG | OXYGEN SATURATION: 99 % | DIASTOLIC BLOOD PRESSURE: 82 MMHG | RESPIRATION RATE: 16 BRPM

## 2022-11-23 DIAGNOSIS — F43.10 PTSD (POST-TRAUMATIC STRESS DISORDER): ICD-10-CM

## 2022-11-23 DIAGNOSIS — F41.1 ANXIETY REACTION: ICD-10-CM

## 2022-11-23 LAB
ALBUMIN SERPL BCG-MCNC: 4.3 G/DL (ref 3.5–5.2)
ALBUMIN UR-MCNC: NEGATIVE MG/DL
ALP SERPL-CCNC: 60 U/L (ref 35–104)
ALT SERPL W P-5'-P-CCNC: 13 U/L (ref 10–35)
ANION GAP SERPL CALCULATED.3IONS-SCNC: 11 MMOL/L (ref 7–15)
APPEARANCE UR: CLEAR
AST SERPL W P-5'-P-CCNC: 19 U/L (ref 10–35)
BASOPHILS # BLD AUTO: 0.1 10E3/UL (ref 0–0.2)
BASOPHILS NFR BLD AUTO: 1 %
BILIRUB SERPL-MCNC: 0.2 MG/DL
BILIRUB UR QL STRIP: NEGATIVE
BUN SERPL-MCNC: 7.8 MG/DL (ref 6–20)
CALCIUM SERPL-MCNC: 9.3 MG/DL (ref 8.6–10)
CHLORIDE SERPL-SCNC: 103 MMOL/L (ref 98–107)
COLOR UR AUTO: ABNORMAL
CREAT SERPL-MCNC: 0.61 MG/DL (ref 0.51–0.95)
DEPRECATED HCO3 PLAS-SCNC: 23 MMOL/L (ref 22–29)
EOSINOPHIL # BLD AUTO: 0.1 10E3/UL (ref 0–0.7)
EOSINOPHIL NFR BLD AUTO: 2 %
ERYTHROCYTE [DISTWIDTH] IN BLOOD BY AUTOMATED COUNT: 12.5 % (ref 10–15)
GFR SERPL CREATININE-BSD FRML MDRD: >90 ML/MIN/1.73M2
GLUCOSE SERPL-MCNC: 92 MG/DL (ref 70–99)
GLUCOSE UR STRIP-MCNC: NEGATIVE MG/DL
HCT VFR BLD AUTO: 43.3 % (ref 35–47)
HGB BLD-MCNC: 14.4 G/DL (ref 11.7–15.7)
HGB UR QL STRIP: NEGATIVE
HIV 1+2 AB+HIV1P24 AG SERPLBLD IA.RAPID: NON REACTIVE
HIV 1+2 AB+HIV1P24 AG SERPLBLD IA.RAPID: NON REACTIVE
HIV INTERPRETATION: NORMAL
IMM GRANULOCYTES # BLD: 0 10E3/UL
IMM GRANULOCYTES NFR BLD: 0 %
KETONES UR STRIP-MCNC: NEGATIVE MG/DL
LEUKOCYTE ESTERASE UR QL STRIP: NEGATIVE
LYMPHOCYTES # BLD AUTO: 1.9 10E3/UL (ref 0.8–5.3)
LYMPHOCYTES NFR BLD AUTO: 33 %
MCH RBC QN AUTO: 29.9 PG (ref 26.5–33)
MCHC RBC AUTO-ENTMCNC: 33.3 G/DL (ref 31.5–36.5)
MCV RBC AUTO: 90 FL (ref 78–100)
MONOCYTES # BLD AUTO: 0.4 10E3/UL (ref 0–1.3)
MONOCYTES NFR BLD AUTO: 8 %
MUCOUS THREADS #/AREA URNS LPF: PRESENT /LPF
NEUTROPHILS # BLD AUTO: 3.3 10E3/UL (ref 1.6–8.3)
NEUTROPHILS NFR BLD AUTO: 56 %
NITRATE UR QL: NEGATIVE
NRBC # BLD AUTO: 0 10E3/UL
NRBC BLD AUTO-RTO: 0 /100
PH UR STRIP: 6.5 [PH] (ref 5–7)
PLATELET # BLD AUTO: 239 10E3/UL (ref 150–450)
POTASSIUM SERPL-SCNC: 3.8 MMOL/L (ref 3.4–5.3)
PROT SERPL-MCNC: 7 G/DL (ref 6.4–8.3)
RBC # BLD AUTO: 4.82 10E6/UL (ref 3.8–5.2)
RBC URINE: 0 /HPF
SARS-COV-2 RNA RESP QL NAA+PROBE: NEGATIVE
SODIUM SERPL-SCNC: 137 MMOL/L (ref 136–145)
SP GR UR STRIP: 1.01 (ref 1–1.03)
SQUAMOUS EPITHELIAL: 2 /HPF
UROBILINOGEN UR STRIP-MCNC: NORMAL MG/DL
WBC # BLD AUTO: 5.8 10E3/UL (ref 4–11)
WBC URINE: <1 /HPF

## 2022-11-23 PROCEDURE — U0003 INFECTIOUS AGENT DETECTION BY NUCLEIC ACID (DNA OR RNA); SEVERE ACUTE RESPIRATORY SYNDROME CORONAVIRUS 2 (SARS-COV-2) (CORONAVIRUS DISEASE [COVID-19]), AMPLIFIED PROBE TECHNIQUE, MAKING USE OF HIGH THROUGHPUT TECHNOLOGIES AS DESCRIBED BY CMS-2020-01-R: HCPCS | Performed by: EMERGENCY MEDICINE

## 2022-11-23 PROCEDURE — 258N000003 HC RX IP 258 OP 636: Performed by: EMERGENCY MEDICINE

## 2022-11-23 PROCEDURE — 87591 N.GONORRHOEAE DNA AMP PROB: CPT | Performed by: EMERGENCY MEDICINE

## 2022-11-23 PROCEDURE — 80053 COMPREHEN METABOLIC PANEL: CPT | Performed by: EMERGENCY MEDICINE

## 2022-11-23 PROCEDURE — 87491 CHLMYD TRACH DNA AMP PROBE: CPT | Performed by: EMERGENCY MEDICINE

## 2022-11-23 PROCEDURE — 99285 EMERGENCY DEPT VISIT HI MDM: CPT | Mod: 25

## 2022-11-23 PROCEDURE — 87806 HIV AG W/HIV1&2 ANTB W/OPTIC: CPT | Performed by: EMERGENCY MEDICINE

## 2022-11-23 PROCEDURE — 96374 THER/PROPH/DIAG INJ IV PUSH: CPT

## 2022-11-23 PROCEDURE — 85018 HEMOGLOBIN: CPT | Performed by: EMERGENCY MEDICINE

## 2022-11-23 PROCEDURE — 250N000011 HC RX IP 250 OP 636: Performed by: EMERGENCY MEDICINE

## 2022-11-23 PROCEDURE — 36415 COLL VENOUS BLD VENIPUNCTURE: CPT | Performed by: EMERGENCY MEDICINE

## 2022-11-23 PROCEDURE — 81001 URINALYSIS AUTO W/SCOPE: CPT | Performed by: EMERGENCY MEDICINE

## 2022-11-23 PROCEDURE — 96361 HYDRATE IV INFUSION ADD-ON: CPT

## 2022-11-23 PROCEDURE — 90791 PSYCH DIAGNOSTIC EVALUATION: CPT

## 2022-11-23 PROCEDURE — 87389 HIV-1 AG W/HIV-1&-2 AB AG IA: CPT | Performed by: EMERGENCY MEDICINE

## 2022-11-23 PROCEDURE — C9803 HOPD COVID-19 SPEC COLLECT: HCPCS

## 2022-11-23 RX ORDER — LORAZEPAM 2 MG/ML
1 INJECTION INTRAMUSCULAR ONCE
Status: COMPLETED | OUTPATIENT
Start: 2022-11-23 | End: 2022-11-23

## 2022-11-23 RX ORDER — ACETAMINOPHEN 500 MG
500-1000 TABLET ORAL EVERY 6 HOURS PRN
COMMUNITY
End: 2024-09-16

## 2022-11-23 RX ADMIN — LORAZEPAM 1 MG: 2 INJECTION INTRAMUSCULAR; INTRAVENOUS at 15:41

## 2022-11-23 RX ADMIN — SODIUM CHLORIDE 1000 ML: 9 INJECTION, SOLUTION INTRAVENOUS at 15:25

## 2022-11-23 ASSESSMENT — ACTIVITIES OF DAILY LIVING (ADL)
ADLS_ACUITY_SCORE: 35
ADLS_ACUITY_SCORE: 37

## 2022-11-23 ASSESSMENT — COLUMBIA-SUICIDE SEVERITY RATING SCALE - C-SSRS
TOTAL  NUMBER OF ABORTED OR SELF INTERRUPTED ATTEMPTS LIFETIME: NO
ATTEMPT LIFETIME: NO
1. HAVE YOU WISHED YOU WERE DEAD OR WISHED YOU COULD GO TO SLEEP AND NOT WAKE UP?: YES
TOTAL  NUMBER OF INTERRUPTED ATTEMPTS LIFETIME: NO
REASONS FOR IDEATION PAST MONTH: DOES NOT APPLY
1. IN THE PAST MONTH, HAVE YOU WISHED YOU WERE DEAD OR WISHED YOU COULD GO TO SLEEP AND NOT WAKE UP?: NO
6. HAVE YOU EVER DONE ANYTHING, STARTED TO DO ANYTHING, OR PREPARED TO DO ANYTHING TO END YOUR LIFE?: NO
REASONS FOR IDEATION LIFETIME: DOES NOT APPLY
2. HAVE YOU ACTUALLY HAD ANY THOUGHTS OF KILLING YOURSELF?: NO
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
## 2022-11-23 NOTE — ED NOTES
Patient will be a voluntary admission, Dr. Olvera in agreement.     Central intake called and provided clinical, they will call ED with placement info as it becomes available.
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
## 2022-11-24 ENCOUNTER — HOSPITAL ENCOUNTER (INPATIENT)
Facility: CLINIC | Age: 37
LOS: 7 days | Discharge: HOME | DRG: 885 | End: 2022-12-01
Attending: PSYCHIATRY & NEUROLOGY | Admitting: PSYCHIATRY & NEUROLOGY
Payer: COMMERCIAL

## 2022-11-24 ENCOUNTER — TELEPHONE (OUTPATIENT)
Dept: BEHAVIORAL HEALTH | Facility: CLINIC | Age: 37
End: 2022-11-24

## 2022-11-24 DIAGNOSIS — F41.9 ANXIETY: Primary | ICD-10-CM

## 2022-11-24 DIAGNOSIS — F17.203 NICOTINE WITHDRAWAL: ICD-10-CM

## 2022-11-24 DIAGNOSIS — F43.10 PTSD (POST-TRAUMATIC STRESS DISORDER): ICD-10-CM

## 2022-11-24 DIAGNOSIS — M54.50 ACUTE BILATERAL LOW BACK PAIN WITHOUT SCIATICA: ICD-10-CM

## 2022-11-24 LAB
AMPHETAMINES UR QL SCN: ABNORMAL
BARBITURATES UR QL: ABNORMAL
BENZODIAZ UR QL: ABNORMAL
C TRACH DNA SPEC QL NAA+PROBE: NEGATIVE
CANNABINOIDS UR QL SCN: ABNORMAL
COCAINE UR QL: ABNORMAL
ETHANOL UR QL SCN: ABNORMAL
HCG UR QL: NEGATIVE
N GONORRHOEA DNA SPEC QL NAA+PROBE: NEGATIVE
OPIATES UR QL SCN: ABNORMAL

## 2022-11-24 PROCEDURE — 250N000013 HC RX MED GY IP 250 OP 250 PS 637: Performed by: PSYCHIATRY & NEUROLOGY

## 2022-11-24 PROCEDURE — 124N000002 HC R&B MH UMMC

## 2022-11-24 PROCEDURE — 81025 URINE PREGNANCY TEST: CPT | Performed by: PSYCHIATRY & NEUROLOGY

## 2022-11-24 PROCEDURE — 80307 DRUG TEST PRSMV CHEM ANLYZR: CPT | Performed by: PSYCHIATRY & NEUROLOGY

## 2022-11-24 PROCEDURE — 99222 1ST HOSP IP/OBS MODERATE 55: CPT | Mod: AI | Performed by: PSYCHIATRY & NEUROLOGY

## 2022-11-24 RX ORDER — LORAZEPAM 0.5 MG/1
0.5 TABLET ORAL DAILY PRN
Status: DISCONTINUED | OUTPATIENT
Start: 2022-11-24 | End: 2022-12-01 | Stop reason: HOSPADM

## 2022-11-24 RX ORDER — TRIAMCINOLONE ACETONIDE 1 MG/G
CREAM TOPICAL 2 TIMES DAILY PRN
Status: DISCONTINUED | OUTPATIENT
Start: 2022-11-24 | End: 2022-12-01 | Stop reason: HOSPADM

## 2022-11-24 RX ORDER — TRAZODONE HYDROCHLORIDE 50 MG/1
50 TABLET, FILM COATED ORAL
Status: DISCONTINUED | OUTPATIENT
Start: 2022-11-24 | End: 2022-12-01 | Stop reason: HOSPADM

## 2022-11-24 RX ORDER — HYDROXYZINE HYDROCHLORIDE 25 MG/1
25 TABLET, FILM COATED ORAL EVERY 4 HOURS PRN
Status: DISCONTINUED | OUTPATIENT
Start: 2022-11-24 | End: 2022-11-24

## 2022-11-24 RX ORDER — FLUOXETINE 10 MG/1
10 CAPSULE ORAL DAILY
Status: DISCONTINUED | OUTPATIENT
Start: 2022-11-24 | End: 2022-11-25

## 2022-11-24 RX ORDER — PRENATAL VIT/IRON FUM/FOLIC AC 27MG-0.8MG
1 TABLET ORAL DAILY
Status: DISCONTINUED | OUTPATIENT
Start: 2022-11-24 | End: 2022-12-01 | Stop reason: HOSPADM

## 2022-11-24 RX ORDER — MAGNESIUM HYDROXIDE/ALUMINUM HYDROXICE/SIMETHICONE 120; 1200; 1200 MG/30ML; MG/30ML; MG/30ML
30 SUSPENSION ORAL EVERY 4 HOURS PRN
Status: DISCONTINUED | OUTPATIENT
Start: 2022-11-24 | End: 2022-11-24

## 2022-11-24 RX ORDER — LORAZEPAM 1 MG/1
1 TABLET ORAL EVERY 4 HOURS PRN
Status: DISCONTINUED | OUTPATIENT
Start: 2022-11-24 | End: 2022-11-24

## 2022-11-24 RX ORDER — PROPRANOLOL HYDROCHLORIDE 10 MG/1
10 TABLET ORAL 3 TIMES DAILY PRN
Status: DISCONTINUED | OUTPATIENT
Start: 2022-11-24 | End: 2022-12-01 | Stop reason: HOSPADM

## 2022-11-24 RX ORDER — AMOXICILLIN 250 MG
1 CAPSULE ORAL 2 TIMES DAILY PRN
Status: DISCONTINUED | OUTPATIENT
Start: 2022-11-24 | End: 2022-12-01 | Stop reason: HOSPADM

## 2022-11-24 RX ORDER — AMOXICILLIN 250 MG
1 CAPSULE ORAL 2 TIMES DAILY PRN
Status: DISCONTINUED | OUTPATIENT
Start: 2022-11-24 | End: 2022-11-24

## 2022-11-24 RX ORDER — OLANZAPINE 10 MG/1
10 TABLET ORAL 3 TIMES DAILY PRN
Status: DISCONTINUED | OUTPATIENT
Start: 2022-11-24 | End: 2022-12-01 | Stop reason: HOSPADM

## 2022-11-24 RX ORDER — OLANZAPINE 10 MG/2ML
10 INJECTION, POWDER, FOR SOLUTION INTRAMUSCULAR 3 TIMES DAILY PRN
Status: DISCONTINUED | OUTPATIENT
Start: 2022-11-24 | End: 2022-12-01 | Stop reason: HOSPADM

## 2022-11-24 RX ORDER — OLANZAPINE 10 MG/1
10 TABLET ORAL 3 TIMES DAILY PRN
Status: DISCONTINUED | OUTPATIENT
Start: 2022-11-24 | End: 2022-11-24

## 2022-11-24 RX ORDER — ALBUTEROL SULFATE 90 UG/1
2 AEROSOL, METERED RESPIRATORY (INHALATION) EVERY 6 HOURS PRN
Status: DISCONTINUED | OUTPATIENT
Start: 2022-11-24 | End: 2022-12-01 | Stop reason: HOSPADM

## 2022-11-24 RX ORDER — CYCLOBENZAPRINE HCL 10 MG
10 TABLET ORAL 3 TIMES DAILY PRN
Status: DISCONTINUED | OUTPATIENT
Start: 2022-11-24 | End: 2022-12-01 | Stop reason: HOSPADM

## 2022-11-24 RX ORDER — OLANZAPINE 10 MG/2ML
10 INJECTION, POWDER, FOR SOLUTION INTRAMUSCULAR 3 TIMES DAILY PRN
Status: DISCONTINUED | OUTPATIENT
Start: 2022-11-24 | End: 2022-11-24

## 2022-11-24 RX ORDER — ACETAMINOPHEN 500 MG
500-1000 TABLET ORAL EVERY 6 HOURS PRN
Status: DISCONTINUED | OUTPATIENT
Start: 2022-11-24 | End: 2022-11-24

## 2022-11-24 RX ORDER — PRAZOSIN HYDROCHLORIDE 1 MG/1
1 CAPSULE ORAL AT BEDTIME
Status: DISCONTINUED | OUTPATIENT
Start: 2022-11-24 | End: 2022-12-01 | Stop reason: HOSPADM

## 2022-11-24 RX ORDER — HYDROXYZINE HYDROCHLORIDE 25 MG/1
25 TABLET, FILM COATED ORAL EVERY 4 HOURS PRN
Status: DISCONTINUED | OUTPATIENT
Start: 2022-11-24 | End: 2022-12-01 | Stop reason: HOSPADM

## 2022-11-24 RX ORDER — MAGNESIUM HYDROXIDE/ALUMINUM HYDROXICE/SIMETHICONE 120; 1200; 1200 MG/30ML; MG/30ML; MG/30ML
30 SUSPENSION ORAL EVERY 4 HOURS PRN
Status: DISCONTINUED | OUTPATIENT
Start: 2022-11-24 | End: 2022-12-01 | Stop reason: HOSPADM

## 2022-11-24 RX ORDER — ACETAMINOPHEN 325 MG/1
650 TABLET ORAL EVERY 4 HOURS PRN
Status: DISCONTINUED | OUTPATIENT
Start: 2022-11-24 | End: 2022-12-01 | Stop reason: HOSPADM

## 2022-11-24 RX ADMIN — ACETAMINOPHEN 650 MG: 325 TABLET, FILM COATED ORAL at 08:31

## 2022-11-24 RX ADMIN — NICOTINE POLACRILEX 2 MG: 2 GUM, CHEWING ORAL at 09:37

## 2022-11-24 RX ADMIN — NICOTINE 7 MG/24 HR DAILY TRANSDERMAL PATCH 1 PATCH: at 13:03

## 2022-11-24 RX ADMIN — NICOTINE POLACRILEX 2 MG: 2 GUM, CHEWING ORAL at 16:09

## 2022-11-24 RX ADMIN — SERTRALINE HYDROCHLORIDE 50 MG: 50 TABLET ORAL at 13:03

## 2022-11-24 RX ADMIN — PRENATAL VITAMINS-IRON FUMARATE 27 MG IRON-FOLIC ACID 0.8 MG TABLET 1 TABLET: at 13:04

## 2022-11-24 RX ADMIN — FLUOXETINE 10 MG: 10 CAPSULE ORAL at 13:05

## 2022-11-24 RX ADMIN — HYDROXYZINE HYDROCHLORIDE 25 MG: 25 TABLET, FILM COATED ORAL at 16:08

## 2022-11-24 RX ADMIN — HYDROXYZINE HYDROCHLORIDE 25 MG: 25 TABLET, FILM COATED ORAL at 08:34

## 2022-11-24 RX ADMIN — PRAZOSIN HYDROCHLORIDE 1 MG: 1 CAPSULE ORAL at 21:06

## 2022-11-24 ASSESSMENT — ACTIVITIES OF DAILY LIVING (ADL)
CONCENTRATING,_REMEMBERING_OR_MAKING_DECISIONS_DIFFICULTY: YES
ADLS_ACUITY_SCORE: 43
DRESS: INDEPENDENT
ADLS_ACUITY_SCORE: 44
DIFFICULTY_EATING/SWALLOWING: NO
ORAL_HYGIENE: INDEPENDENT
DRESS: INDEPENDENT
WEAR_GLASSES_OR_BLIND: YES
LAUNDRY: WITH SUPERVISION
ADLS_ACUITY_SCORE: 44
ORAL_HYGIENE: INDEPENDENT
HYGIENE/GROOMING: INDEPENDENT
ADLS_ACUITY_SCORE: 43
ADLS_ACUITY_SCORE: 44
ADLS_ACUITY_SCORE: 43
ADLS_ACUITY_SCORE: 37
ADLS_ACUITY_SCORE: 44
TOILETING_ISSUES: NO
CHANGE_IN_FUNCTIONAL_STATUS_SINCE_ONSET_OF_CURRENT_ILLNESS/INJURY: NO
LAUNDRY: WITH SUPERVISION
HYGIENE/GROOMING: INDEPENDENT
DOING_ERRANDS_INDEPENDENTLY_DIFFICULTY: NO
FALL_HISTORY_WITHIN_LAST_SIX_MONTHS: NO
ADLS_ACUITY_SCORE: 43
ADLS_ACUITY_SCORE: 43
WALKING_OR_CLIMBING_STAIRS_DIFFICULTY: NO
DRESSING/BATHING_DIFFICULTY: NO

## 2022-11-24 NOTE — TELEPHONE ENCOUNTER
Inpatient Bed Call Log 11.24.22  1:00 am    Adults:    [facility] is posting 0 bed.      [facility] is posting 0 beds.     [facility] is posting 0 beds.     [facility] is posting 0 beds.     [facility] is posting 0 beds.     [facility] is posting 0 beds.     [facility] is posting 0 beds.     [facility] is posting 0 beds.
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
## 2022-11-24 NOTE — CONSULTS
TELEPSYCHIATRY TELEPHONE ADMISSION NOTE    Discussed with nurse Evangelist.  37-year-old female presented with depression.  Patient has been medically cleared.  PMH significant for asthma, sleep apnea.  Allergy to ceclor.  No known serious substance withdrawal reactions.      Lab findings: COVID negative. UDS unavailable. Alcohol level: unavailable. Pregnancy test: unavailable.     PLAN:    --Disposition: Admit to psychiatry under voluntary status.  Routine admission orders placed.  Obtain pregnancy test.  --Precautions: Suicide.  --Psychiatric medications: Hold.    --Medical medications: Hold.          Canelo Aparicio MD  Psychiatrist
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
## 2022-11-24 NOTE — PHARMACY-ADMISSION MEDICATION HISTORY
Please see Admission Medication History note completed by a pharmacist on 11/23/2022 under previous encounter at [facility] for information regarding prior to admission medications.    Alba Salazar, PharmD, [facility]  Behavioral Health Pharmacist  [employer] Ascom: *[phone] or *[phone]

## 2022-11-24 NOTE — PLAN OF CARE
11/24/22 0958   Patient Belongings   Patient Belongings other (see comments)   Patient Belongings Put in Hospital Secure Location (Security or Locker, etc.) other (see comments)   Belongings Search Yes   Clothing Search Yes   Second Staff Y     In locker:  Boots  Pink blanket  Pink bucket  Samsung phone  Papers/notebook  Markers  Purple jacket  Pack of cigarettes in jacket pocket  Black charging chord  Pink headphones  Bible  2 books  Back brace (blue/gray)  Gray charging chord w/white block  [place] ID  Health insurance cards  [facility] Kromatid card  Green purse  In pink bag:  Pink cross necklace  White cross  Blue pj pants x3  Red longsleeve shirt  Black shirt  Blue shirt  Blue boxers  Gray briefs  Coloring books x2    To security:  12 dollars cash(2x 5 dollar bill, 2x 1 dollar bill)  [place] P-EBT [id number]  VISA debit [id number]  [employer] FSA debit [id number]  [employer] HSA debit [id number]  Benefits card debit [id number]  Mastercard credit [id number]      A               Admission:  I am responsible for any personal items that are not sent to the safe or pharmacy.  [facility] is not responsible for loss, theft or damage of any property in my possession.    Signature:  _________________________________ Date: _______  Time: _____                                              Staff Signature:  ____________________________ Date: ________  Time: _____      2nd Staff person, if patient is unable/unwilling to sign:    Signature: ________________________________ Date: ________  Time: _____     Discharge:  [facility] has returned all of my personal belongings:    Signature: _________________________________ Date: ________  Time: _____                                          Staff Signature:  ____________________________ Date: ________  Time: _____

## 2022-11-24 NOTE — PHARMACY-ADMISSION MEDICATION HISTORY
Admission medication history interview status for this patient is complete. See [facility] admission navigator for allergy information, prior to admission medications and immunization status.     Medication history interview done, indicate source(s): Patient  Medication history resources (including written lists, pill bottles, clinic record):pauline    Changes made to PTA medication list:  Added: none  Changed: none  Reported as Not Taking: propranolol, triamcinolone cream   Removed: none  Actions taken by pharmacist (provider contacted, etc):None     Additional medication history information:None    Medication reconciliation/reorder completed by provider prior to medication history?  N   (Y/N)     Prior to Admission medications    Medication Sig Last Dose Taking? Auth Provider Long Term End Date   Prenatal Vit-Fe Fumarate-FA (PRENATAL COMPLETE) 14-0.4 MG TABS Take 1 tablet by mouth daily 11/23/2022 Yes Sosa Posey PA     sertraline (ZOLOFT) 100 MG tablet Take 1 tablet (100 mg) by mouth daily 11/23/2022 at am Yes Kristal Saravia CNP Yes    albuterol (PROAIR HFA/PROVENTIL HFA/VENTOLIN HFA) 108 (90 Base) MCG/ACT inhaler Inhale 2 puffs into the lungs every 6 hours prn at prn  Haase, Susan Rachele, APRN CNP Yes    cyclobenzaprine (FLEXERIL) 10 MG tablet TAKE 1 TABLET (10 MG) BY MOUTH 3 TIMES DAILY AS NEEDED FOR MUSCLE SPASMS prn at prn  Kristal Saravia CNP No    LORazepam (ATIVAN) 0.5 MG tablet Take 1 tablet (0.5 mg) by mouth every 6 hours as needed for anxiety prn at prn  Kristal Saravia CNP     propranolol (INDERAL) 10 MG tablet Take 1 tablet (10 mg) by mouth 3 times daily  Patient not taking: Reported on 11/23/2022 Not Taking  Kristal Saravia CNP Yes    triamcinolone (KENALOG) 0.1 % external cream Apply topically 2 times daily  Patient not taking: Reported on 11/23/2022 Not Taking  Kristal Saravia CNP

## 2022-11-25 LAB
CHOLEST SERPL-MCNC: 150 MG/DL
HDLC SERPL-MCNC: 45 MG/DL
HIV 1+2 AB+HIV1 P24 AG SERPL QL IA: NONREACTIVE
LDLC SERPL CALC-MCNC: 88 MG/DL
NONHDLC SERPL-MCNC: 105 MG/DL
TRIGL SERPL-MCNC: 85 MG/DL
TSH SERPL DL<=0.005 MIU/L-ACNC: 1.48 MU/L (ref 0.4–4)

## 2022-11-25 PROCEDURE — 80061 LIPID PANEL: CPT | Performed by: PSYCHIATRY & NEUROLOGY

## 2022-11-25 PROCEDURE — 36415 COLL VENOUS BLD VENIPUNCTURE: CPT | Performed by: PSYCHIATRY & NEUROLOGY

## 2022-11-25 PROCEDURE — 84443 ASSAY THYROID STIM HORMONE: CPT | Performed by: PSYCHIATRY & NEUROLOGY

## 2022-11-25 PROCEDURE — 124N000002 HC R&B MH UMMC

## 2022-11-25 PROCEDURE — 99233 SBSQ HOSP IP/OBS HIGH 50: CPT | Performed by: PSYCHIATRY & NEUROLOGY

## 2022-11-25 PROCEDURE — 250N000013 HC RX MED GY IP 250 OP 250 PS 637: Performed by: PSYCHIATRY & NEUROLOGY

## 2022-11-25 RX ORDER — LANOLIN ALCOHOL/MO/W.PET/CERES
3 CREAM (GRAM) TOPICAL
Status: DISCONTINUED | OUTPATIENT
Start: 2022-11-25 | End: 2022-12-01 | Stop reason: HOSPADM

## 2022-11-25 RX ADMIN — SENNOSIDES AND DOCUSATE SODIUM 1 TABLET: 50; 8.6 TABLET ORAL at 17:01

## 2022-11-25 RX ADMIN — ALBUTEROL SULFATE 2 PUFF: 90 AEROSOL, METERED RESPIRATORY (INHALATION) at 18:40

## 2022-11-25 RX ADMIN — LORAZEPAM 0.5 MG: 0.5 TABLET ORAL at 16:45

## 2022-11-25 RX ADMIN — PRENATAL VITAMINS-IRON FUMARATE 27 MG IRON-FOLIC ACID 0.8 MG TABLET 1 TABLET: at 08:16

## 2022-11-25 RX ADMIN — FLUOXETINE 10 MG: 10 CAPSULE ORAL at 08:16

## 2022-11-25 RX ADMIN — CYCLOBENZAPRINE HYDROCHLORIDE 10 MG: 10 TABLET, FILM COATED ORAL at 08:30

## 2022-11-25 RX ADMIN — PRAZOSIN HYDROCHLORIDE 1 MG: 1 CAPSULE ORAL at 19:54

## 2022-11-25 RX ADMIN — NICOTINE 7 MG/24 HR DAILY TRANSDERMAL PATCH 1 PATCH: at 08:16

## 2022-11-25 RX ADMIN — TRAZODONE HYDROCHLORIDE 50 MG: 50 TABLET ORAL at 19:54

## 2022-11-25 RX ADMIN — SERTRALINE HYDROCHLORIDE 50 MG: 50 TABLET ORAL at 08:16

## 2022-11-25 RX ADMIN — NICOTINE POLACRILEX 2 MG: 2 GUM, CHEWING ORAL at 08:16

## 2022-11-25 RX ADMIN — NICOTINE POLACRILEX 2 MG: 2 GUM, CHEWING ORAL at 16:39

## 2022-11-25 RX ADMIN — HYDROXYZINE HYDROCHLORIDE 25 MG: 25 TABLET, FILM COATED ORAL at 05:56

## 2022-11-25 ASSESSMENT — ACTIVITIES OF DAILY LIVING (ADL)
LAUNDRY: WITH SUPERVISION
HYGIENE/GROOMING: INDEPENDENT
ADLS_ACUITY_SCORE: 43
ADLS_ACUITY_SCORE: 44
ADLS_ACUITY_SCORE: 43
ADLS_ACUITY_SCORE: 43
ADLS_ACUITY_SCORE: 44
ADLS_ACUITY_SCORE: 43
ADLS_ACUITY_SCORE: 44
DRESS: INDEPENDENT
ADLS_ACUITY_SCORE: 44
LAUNDRY: WITH SUPERVISION
ORAL_HYGIENE: INDEPENDENT
ADLS_ACUITY_SCORE: 43
DRESS: INDEPENDENT
ADLS_ACUITY_SCORE: 44
ORAL_HYGIENE: INDEPENDENT
ADLS_ACUITY_SCORE: 44
HYGIENE/GROOMING: INDEPENDENT
ADLS_ACUITY_SCORE: 44
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
## 2022-11-25 NOTE — PLAN OF CARE
Problem: Psychotic Signs/Symptoms  Goal: Improved Mood Symptoms  Intervention: Optimize Emotion and Mood  Recent Flowsheet Documentation  Taken 11/24/2022 1700 by Casalenda, Gina R, RN  Diversional Activity:    art work    music    television     Problem: Psychotic Signs/Symptoms  Goal: Improved Psychomotor Symptoms (Psychotic Signs/Symptoms)  11/24/2022 2235 by Casalenda, Gina R, RN  Outcome: Progressing  Flowsheets (Taken 11/24/2022 2235)  Mutually Determined Action Steps (Improved Psychomotor Symptoms): adheres to medication regimen    Pt endorses anxiety and depression but is unable to rate - PRN atarax given. Pt reports PRN atarax helpful. Pt denies SI/SIB/HI/AH/VH. She contracts for safety. Pt denies pain this shift. A/O. VSS. Pt observed to be pleasant and cooperative. She showered this evening. Pt's lab results came back - see results. Pt visible on unit watching television, listening to headphones, reading, and doing artwork. Pt keeps to herself while out on the unit. Pt visible intermittently resting in her room. Pt's  visited this evening. Writer reminded pt that her and her visitor cannot touch one another as pt was sitting on his lap. Pt acknowledged an understanding of this. Pt is medication compliant. Pt given PRN nicotine gum per request. Pt denies any medication SE at this time. She denies any questions or concerns at this time. Pt observed to be calm and polite. Pt frequently thanked writer for providing care. Pt reports she sometimes uses melatonin at home and may want an order for melatonin in the future Writer left sticky note for provider regarding this. Pt less tearful this evening compared to day shift. Pt aware that she has labs tomorrow morning, including a fasting lab. Writer also notified oncoming shift of this. Pt remains on suicide precautions. Will continue to monitor and assist as needed.

## 2022-11-25 NOTE — PROGRESS NOTES
"CLINICAL NUTRITION SERVICES - ASSESSMENT NOTE     Nutrition Prescription    RECOMMENDATIONS FOR MDs/PROVIDERS TO ORDER:  None at this time    Malnutrition Status:    Severe malnutrition in the context of acute illness    Recommendations already ordered by Registered Dietitian (RD):  Ensure Enlive at 2 pm and HS (rotate flavors)    Future/Additional Recommendations:  Monitor PO intake, supplement acceptance, and weight trends     REASON FOR ASSESSMENT  Jonelle Khan is a/an 37 year old female assessed by the dietitian for Admission Nutrition Risk Screen for positive (wt loss of 2-13 lbs and decreased appetite)    PMH  Reports a history of asthma, low-grade squamous epithelial lesions, in addition to sleep apnea, depression and anxiety disorder, posttraumatic stress disorder.    NUTRITION HISTORY  Met with pt on unit. She reports a 10 lb weight loss in about 1 month due to decrease in oral intake. She has been very tired and stressed so has not been eating. Her  has been bringing her food in bed. She would take a bite or two and then fall asleep.  Pt uses Ensure at home to help increase her protein intake.    CURRENT NUTRITION ORDERS  Diet: Regular  Intake/Tolerance: no intakes documented  Per pt report, she is eating very well on the unit. She thinks she is eating better here than she typically would at home.    LABS  Labs reviewed  HDL Cholesterol: 45 (L)    MEDICATIONS  Medications reviewed  Prenatal MVI w/ iron  Sertraline    ANTHROPOMETRICS  Height: 160 cm (5' 3\")  Most Recent Weight: 58.1 kg (128 lb)    IBW: 52 kg  BMI: Normal BMI  Weight History:   Wt Readings from Last 10 Encounters:   11/24/22 58.1 kg (128 lb)   10/14/22 62.1 kg (137 lb)   08/03/22 68.4 kg (150 lb 12.7 oz)   03/24/22 70.9 kg (156 lb 6.4 oz)   01/13/22 65.5 kg (144 lb 6.4 oz)   10/13/21 60.6 kg (133 lb 8 oz)   02/19/21 68.5 kg (151 lb)   02/13/21 72.6 kg (160 lb)   08/14/20 73.5 kg (162 lb)   08/07/20 73.5 kg (162 lb)   15% wt loss " in less than 4 months    Dosing Weight: 58 kg (actual BW)    ASSESSED NUTRITION NEEDS  Estimated Energy Needs: 9615-0363+ kcals/day (25 - 30+ kcals/kg)  Justification: Maintenance vs Repletion  Estimated Protein Needs: 58-70+ grams protein/day (1 - 1.2+ grams of pro/kg)  Justification: Maintenance vs Repletion  Estimated Fluid Needs: 1 mL/kcal  Justification: Maintenance    PHYSICAL FINDINGS  See malnutrition section below.    MALNUTRITION  % Intake: </=50% for >/= 1 month (severe)  % Weight Loss: > 10% in 6 months (severe)  Subcutaneous Fat Loss: Facial region: mild  Muscle Loss: Temporal: mild  Fluid Accumulation/Edema: None noted  Malnutrition Diagnosis: Severe malnutrition in the context of acute illness    NUTRITION DIAGNOSIS  Predicted inadequate nutrient intake related to altered mental status as evidenced by pt reliant on PO intake to meet 100% of nutrition needs.      INTERVENTIONS  Implementation  Nutrition Education: Provided education on role of RD in care. Discussed available oral nutrition supplements. Encouraged meals TID once discharged to ensure adequate nutrient intake. Discussed quick and simple breakfast ideas as pt has busy mornings.  Medical food supplement therapy - Ensure BID     Goals  Patient to consume % of nutritionally adequate meal trays TID, or the equivalent with supplements/snacks.     Monitoring/Evaluation  Progress toward goals will be monitored and evaluated per protocol.    Tatianna Wills RD, ANA CRISTINA  Behavioral Clinical Dietitian  Mental Health Pager (M-F): [phone]  On Call Pager (weekends only): [phone]

## 2022-11-25 NOTE — PLAN OF CARE
"  Problem: Psychotic Signs/Symptoms  Goal: Improved Mood Symptoms  11/25/2022 1101 by Casalenda, Gina R, RN  Outcome: Progressing  Flowsheets (Taken 11/25/2022 1101)  Mutually Determined Action Steps (Improved Mood Symptoms): engages in physical activity     Problem: Psychotic Signs/Symptoms  Goal: Improved Mood Symptoms  Intervention: Optimize Emotion and Mood  Recent Flowsheet Documentation  Taken 11/25/2022 0830 by Casalenda, Gina R, RN  Diversional Activity:    art work    music    television    Pt endorses anxiety and depression but is unable to rate at this time - pt received PRN atarax this morning from previous shift. Pt received scheduled medications this morning, declined further pharmacological interventions at this time. Pt denies SI/SIB/HI/AH/VH. She contracts for safety. She reports mild to moderate stiffness and muscle spasms in her neck, shoulders, and lower back - PRN flexeril given per pt request. Pt reports PRN flexeril effective. A/O. Pt had labs drawn this morning - see results. Pt is pleasant and cooperative. Pt observed to be calm and polite. Pt frequently thanks writer and states, \"I appreciate you.\" Pt visible on unit attending meals, coloring, listening to headphones, and talking on the phone. Pt visible intermittently resting in her room. Pt keeps to herself while out on the unit. Pt did not attend group this shift. Pt reports she misses her  and three children. Pt requesting her rosary and blanket from home and stated, \"even if I can just have the cross.\" Pt reports the cross was given to her from her grandpa who has since passed away and she said, \"he is my guardian molly.\" Writer mentioned this to provider during team meeting but also encouraged pt to discuss with provider today. Pt acknowledged an understanding of this. Pt reported yesterday evening that she takes melatonin at home. Writer asked provider for a PRN melatonin order in case pt would like to use it during her " hospitalization stay. Pt denies any medication SE at this time. She denies any questions or concerns at this time. She remains on suicide precautions. Will continue to monitor and assist as needed.

## 2022-11-25 NOTE — PLAN OF CARE
11/25/22 1400   General Information   Has Not Attended OT as of: 11/25/22     Pt has not attended scheduled occupational therapy sessions. Encourage attendance and participation. Pt will be given self-assessment form, and OT staff will explain the purpose of including them in their treatment plan and offer options for meeting their needs.

## 2022-11-25 NOTE — TELEPHONE ENCOUNTER
Attempt # 1  Called Phone # [phone]      Left a non detailed voicemail to please call back and ask for any available triage nurse regarding your phone call @ [phone].     Jennifer YIP RN   [facility] Triage

## 2022-11-25 NOTE — PLAN OF CARE
Problem: Sleep Disturbance  Goal: Adequate Sleep/Rest  Outcome: Progressing     Problem: Suicidal Behavior  Goal: Suicidal Behavior is Absent or Managed  Outcome: Progressing    Pt slept through the night without distress. Pt reported no pain or discomfort. No problems with behavior. No concerns reported by pt. Pt slept 6.25 hours.  Staff will continue to monitor.

## 2022-11-26 PROCEDURE — 250N000013 HC RX MED GY IP 250 OP 250 PS 637: Performed by: PSYCHIATRY & NEUROLOGY

## 2022-11-26 PROCEDURE — 124N000002 HC R&B MH UMMC

## 2022-11-26 PROCEDURE — H2032 ACTIVITY THERAPY, PER 15 MIN: HCPCS

## 2022-11-26 RX ADMIN — SENNOSIDES AND DOCUSATE SODIUM 1 TABLET: 50; 8.6 TABLET ORAL at 08:37

## 2022-11-26 RX ADMIN — PRENATAL VITAMINS-IRON FUMARATE 27 MG IRON-FOLIC ACID 0.8 MG TABLET 1 TABLET: at 08:29

## 2022-11-26 RX ADMIN — NICOTINE 7 MG/24 HR DAILY TRANSDERMAL PATCH 1 PATCH: at 08:30

## 2022-11-26 RX ADMIN — ACETAMINOPHEN 650 MG: 325 TABLET, FILM COATED ORAL at 11:52

## 2022-11-26 RX ADMIN — SERTRALINE HYDROCHLORIDE 50 MG: 50 TABLET ORAL at 08:29

## 2022-11-26 RX ADMIN — NICOTINE POLACRILEX 2 MG: 2 GUM, CHEWING ORAL at 08:38

## 2022-11-26 RX ADMIN — FLUOXETINE 20 MG: 20 CAPSULE ORAL at 08:29

## 2022-11-26 RX ADMIN — PRAZOSIN HYDROCHLORIDE 1 MG: 1 CAPSULE ORAL at 20:05

## 2022-11-26 RX ADMIN — NICOTINE POLACRILEX 2 MG: 2 GUM, CHEWING ORAL at 18:36

## 2022-11-26 RX ADMIN — TRAZODONE HYDROCHLORIDE 50 MG: 50 TABLET ORAL at 20:05

## 2022-11-26 RX ADMIN — LORAZEPAM 0.5 MG: 0.5 TABLET ORAL at 11:44

## 2022-11-26 RX ADMIN — ALBUTEROL SULFATE 2 PUFF: 90 AEROSOL, METERED RESPIRATORY (INHALATION) at 18:35

## 2022-11-26 RX ADMIN — SENNOSIDES AND DOCUSATE SODIUM 1 TABLET: 50; 8.6 TABLET ORAL at 20:05

## 2022-11-26 ASSESSMENT — ACTIVITIES OF DAILY LIVING (ADL)
ADLS_ACUITY_SCORE: 44
LAUNDRY: WITH SUPERVISION
ADLS_ACUITY_SCORE: 44
ORAL_HYGIENE: INDEPENDENT
ADLS_ACUITY_SCORE: 44
HYGIENE/GROOMING: INDEPENDENT
ADLS_ACUITY_SCORE: 44
DRESS: INDEPENDENT
ADLS_ACUITY_SCORE: 44
ADLS_ACUITY_SCORE: 44
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
## 2022-11-26 NOTE — PLAN OF CARE
Night Nursing Note   11/25/22 - 11/26/22      Jonelle was in bed at beginning of shift and appeared asleep.  Monitored q15 mins for safety.  Appeared to sleep throughout night without difficulty.  Continue to monitor, offer support and reassurance per care plan.    Slept 7 hrs.

## 2022-11-26 NOTE — PLAN OF CARE
"Patient spent parts of the shift in the milieu and other times in her room. Patient at the start of the shift became quite emotional and began to sob when she was told that she couldn't have the blanket from home because of the material it was made from.  Patient began to hyperventilate and believed that she was being treated like a child. Was offered prn for anxiety, chose ativan which appeared to help when after checking in with her later. She was advised to ask her family to bring a different one in for her which they did during visiting. Family visited which went well. Ate dinner and snack. Was med compliant. Requested her inhaler and senna for constipation. Reports having anxiety and appears flat and depressed. No stated SI or SIB. Cooperative on the unit.     Patient evaluation continues. Assessed mood,anxiety,thoughts and behavior.     Patient gradually progressing towards goals.    Patient is encouraged to participate in groups and assisted to develop healthy coping skills.     VS reviewed: /82   Pulse 99   Temp 97.8  F (36.6  C) (Oral)   Resp 16   Ht 1.6 m (5' 3\")   Wt 58.1 kg (128 lb)   SpO2 100%   BMI 22.67 kg/m      Length of stay: 1    Refer to daily team meeting notes for individualized plan of care. Nursing will continue to assess.      "
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
## 2022-11-27 PROCEDURE — 124N000002 HC R&B MH UMMC

## 2022-11-27 PROCEDURE — 250N000013 HC RX MED GY IP 250 OP 250 PS 637: Performed by: PSYCHIATRY & NEUROLOGY

## 2022-11-27 PROCEDURE — G0177 OPPS/PHP; TRAIN & EDUC SERV: HCPCS

## 2022-11-27 RX ADMIN — FLUOXETINE 20 MG: 20 CAPSULE ORAL at 08:11

## 2022-11-27 RX ADMIN — PRAZOSIN HYDROCHLORIDE 1 MG: 1 CAPSULE ORAL at 20:45

## 2022-11-27 RX ADMIN — ACETAMINOPHEN 650 MG: 325 TABLET, FILM COATED ORAL at 08:11

## 2022-11-27 RX ADMIN — PROPRANOLOL HYDROCHLORIDE 10 MG: 10 TABLET ORAL at 17:40

## 2022-11-27 RX ADMIN — SERTRALINE HYDROCHLORIDE 50 MG: 50 TABLET ORAL at 08:11

## 2022-11-27 RX ADMIN — NICOTINE POLACRILEX 2 MG: 2 GUM, CHEWING ORAL at 18:44

## 2022-11-27 RX ADMIN — NICOTINE 7 MG/24 HR DAILY TRANSDERMAL PATCH 1 PATCH: at 08:19

## 2022-11-27 RX ADMIN — TRAZODONE HYDROCHLORIDE 50 MG: 50 TABLET ORAL at 20:45

## 2022-11-27 RX ADMIN — NICOTINE POLACRILEX 2 MG: 2 GUM, CHEWING ORAL at 13:00

## 2022-11-27 RX ADMIN — PRENATAL VITAMINS-IRON FUMARATE 27 MG IRON-FOLIC ACID 0.8 MG TABLET 1 TABLET: at 08:10

## 2022-11-27 RX ADMIN — ALBUTEROL SULFATE 2 PUFF: 90 AEROSOL, METERED RESPIRATORY (INHALATION) at 09:53

## 2022-11-27 RX ADMIN — HYDROXYZINE HYDROCHLORIDE 25 MG: 25 TABLET, FILM COATED ORAL at 08:29

## 2022-11-27 RX ADMIN — MELATONIN TAB 3 MG 3 MG: 3 TAB at 22:55

## 2022-11-27 RX ADMIN — NICOTINE POLACRILEX 2 MG: 2 GUM, CHEWING ORAL at 09:54

## 2022-11-27 ASSESSMENT — ACTIVITIES OF DAILY LIVING (ADL)
LAUNDRY: WITH SUPERVISION
ADLS_ACUITY_SCORE: 44
ORAL_HYGIENE: INDEPENDENT
ADLS_ACUITY_SCORE: 44
HYGIENE/GROOMING: HANDWASHING;SHOWER;INDEPENDENT
LAUNDRY: WITH SUPERVISION
HYGIENE/GROOMING: INDEPENDENT
ADLS_ACUITY_SCORE: 44
DRESS: SCRUBS (BEHAVIORAL HEALTH);INDEPENDENT
ORAL_HYGIENE: INDEPENDENT
ADLS_ACUITY_SCORE: 44
DRESS: SCRUBS (BEHAVIORAL HEALTH);INDEPENDENT
ADLS_ACUITY_SCORE: 44

## 2022-11-27 NOTE — PLAN OF CARE
Night Nursing Note  11/26/22 - 11/27/22      Jonelle was in bed at beginning of shift and appeared asleep.  Monitored q15 mins for safety.  Appeared to sleep throughout night without difficulty.  Continue to monitor, offer support and reassurance per care plan.    Slept 6.75 hrs.

## 2022-11-27 NOTE — PLAN OF CARE
Problem: Psychotic Signs/Symptoms  Goal: Improved Behavioral Control (Psychotic Signs/Symptoms)  Outcome: Progressing   Goal Outcome Evaluation:    Patient observed reading a book in her bed. Denies all psych symptoms. Stated PRN ativan given by day nurse was very helpful. Appearance and appetite is good. Ate most of her dinner and snacks. Had a visit from her sister and . Sister brought a small nail clipper for patient. Pt used it under supervision. Nail clipper placed in pt's lock. No panic attack observed this shift. Pt remains pleasant and cooperative, no suicidal ideation stated or reported this shift.

## 2022-11-27 NOTE — PLAN OF CARE
"Goal Outcome Evaluation:    Plan of Care Reviewed With: patient        Pt spent more than 50% of the shift in common areas of the unit. Pt denies any suicidal ideation. \" I never have suicidal thoughts. I know what I have to live for. Pt rode exercise bike and took a shower for self care to cope with anxiety. Pt reports fragmented sleep last night but nightmares better. Pt received family visit that brightened her day.  Pt anticipates visit with from  this evening.  Pt was very appreciative of staff care.            "

## 2022-11-27 NOTE — PLAN OF CARE
11/27/22 1300   Group Therapy Session   Group Attendance attended group session   Time Session Began 1115   Time Session Ended 1200   Total Time (minutes) 45   Total # Attendees 4   Group Type expressive therapy   Group Topic Covered coping skills/lifestyle management;problem-solving   Patient Response/Contribution cooperative with task;organized   Patient Participation Detail See Note   Pt actively participated in occupational therapy clinic to facilitate coping skill exploration, creative expression within personally meaningful activities, and clinical observation of social, cognitive, and kinesthetic performance skills. Pt response: Independent to initiate, gather materials, sequence, and adjust to workspace demands as needed. Demonstrated good focus, planning, and problem solving for selected ceramic task. Able to ask for assistance as needed, and appeared comfortable interacting with peers and staff. Affect was appropriate to situation and pt initiated several conversations with peers. Pt will continue to be encouraged to attend groups for further asssesssment and to address goals identified on plan of care.

## 2022-11-27 NOTE — PROGRESS NOTES
11/26/22 2100   General Information   Art Directive other (see comments)   Task Orientation    Task orientation skills calm and focused;follows instruction;works independently   Social Interaction   Social interaction skills maintains boundaries;shares appropriately   Product/Content   Product/Content image reflects current feelings;image reflects positive aspects;pride in finished product   Art Therapy directive was to express calm mind vs. anxious mind using lines, shapes, colors and art medium of pts choice.  Goals of directive:  Emotional expression, emotional regulation, trauma containment, mindfulness  Pt was an engaged participant, focused on task for the full duration of group. Pt finished drawings and briefly shared with group. Pt shared how she expressed having negative thoughts in her anxious mind drawing and talked about working with positive affirmations when she is feeling anxious. Pt also talked about how creative expression has been helpful for her since she was young and has positive memories of drawing with her father.  Pts mood was calm, pleasant participant.

## 2022-11-27 NOTE — PROGRESS NOTES
"    Writer was assigned to patient this morning, after giving morning medications writer noted patient crying. This RN  Offered patient  PRN hydroxyzine, patient asked \"why do I need to take PRN hydroxyzine?\". Writer told patient because you are crying, just  want to help you feel better, patient stated  \"it is okay for me to  Cry\". Writer did knowledged patient's feeling, and said it's okay to cry, however patient refused PRN Hydroxyzine and walked back to her room. After a while patient took a shower was noted to be in a cheerful mood again.  "

## 2022-11-28 PROCEDURE — H2032 ACTIVITY THERAPY, PER 15 MIN: HCPCS

## 2022-11-28 PROCEDURE — G0177 OPPS/PHP; TRAIN & EDUC SERV: HCPCS

## 2022-11-28 PROCEDURE — 124N000002 HC R&B MH UMMC

## 2022-11-28 PROCEDURE — 250N000013 HC RX MED GY IP 250 OP 250 PS 637: Performed by: PSYCHIATRY & NEUROLOGY

## 2022-11-28 PROCEDURE — 99233 SBSQ HOSP IP/OBS HIGH 50: CPT | Performed by: PSYCHIATRY & NEUROLOGY

## 2022-11-28 RX ORDER — SERTRALINE HYDROCHLORIDE 25 MG/1
25 TABLET, FILM COATED ORAL DAILY
Status: COMPLETED | OUTPATIENT
Start: 2022-11-29 | End: 2022-12-01

## 2022-11-28 RX ADMIN — FLUOXETINE 20 MG: 20 CAPSULE ORAL at 08:04

## 2022-11-28 RX ADMIN — NICOTINE POLACRILEX 2 MG: 2 GUM, CHEWING ORAL at 18:26

## 2022-11-28 RX ADMIN — ACETAMINOPHEN 650 MG: 325 TABLET, FILM COATED ORAL at 10:13

## 2022-11-28 RX ADMIN — TRAZODONE HYDROCHLORIDE 50 MG: 50 TABLET ORAL at 20:25

## 2022-11-28 RX ADMIN — PROPRANOLOL HYDROCHLORIDE 10 MG: 10 TABLET ORAL at 18:26

## 2022-11-28 RX ADMIN — NICOTINE POLACRILEX 2 MG: 2 GUM, CHEWING ORAL at 10:14

## 2022-11-28 RX ADMIN — PRENATAL VITAMINS-IRON FUMARATE 27 MG IRON-FOLIC ACID 0.8 MG TABLET 1 TABLET: at 08:05

## 2022-11-28 RX ADMIN — NICOTINE 7 MG/24 HR DAILY TRANSDERMAL PATCH 1 PATCH: at 08:09

## 2022-11-28 RX ADMIN — SERTRALINE HYDROCHLORIDE 50 MG: 50 TABLET ORAL at 08:04

## 2022-11-28 RX ADMIN — ALBUTEROL SULFATE 2 PUFF: 90 AEROSOL, METERED RESPIRATORY (INHALATION) at 12:04

## 2022-11-28 RX ADMIN — SENNOSIDES AND DOCUSATE SODIUM 1 TABLET: 50; 8.6 TABLET ORAL at 20:25

## 2022-11-28 RX ADMIN — NICOTINE POLACRILEX 2 MG: 2 GUM, CHEWING ORAL at 16:35

## 2022-11-28 RX ADMIN — PRAZOSIN HYDROCHLORIDE 1 MG: 1 CAPSULE ORAL at 20:25

## 2022-11-28 ASSESSMENT — ACTIVITIES OF DAILY LIVING (ADL)
LAUNDRY: WITH SUPERVISION
ADLS_ACUITY_SCORE: 44
HYGIENE/GROOMING: INDEPENDENT
ADLS_ACUITY_SCORE: 44
DRESS: SCRUBS (BEHAVIORAL HEALTH);INDEPENDENT;STREET CLOTHES
LAUNDRY: WITH SUPERVISION
ADLS_ACUITY_SCORE: 44
ADLS_ACUITY_SCORE: 44
DRESS: INDEPENDENT
ORAL_HYGIENE: INDEPENDENT
ORAL_HYGIENE: INDEPENDENT
ADLS_ACUITY_SCORE: 44
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
## 2022-11-28 NOTE — TELEPHONE ENCOUNTER
Patient returns call.     Patient is currently inpatient at [facility] and has this matter addressed. No appointment needed at this time.   Arava Counseling:  Patient counseled regarding adverse effects of Arava including but not limited to nausea, vomiting, abnormalities in liver function tests. Patients may develop mouth sores, rash, diarrhea, and abnormalities in blood counts. The patient understands that monitoring is required including LFTs and blood counts.  There is a rare possibility of scarring of the liver and lung problems that can occur when taking methotrexate. Persistent nausea, loss of appetite, pale stools, dark urine, cough, and shortness of breath should be reported immediately. Patient advised to discontinue Arava treatment and consult with a physician prior to attempting conception. The patient will have to undergo a treatment to eliminate Arava from the body prior to conception.

## 2022-11-28 NOTE — PLAN OF CARE
11/28/22 1254   Group Therapy Session   Group Attendance attended group session   Time Session Began 1015   Time Session Ended 1100   Total Time (minutes) 45   Total # Attendees 5   Group Type life skill;task skill;other (see comments)  (OT)   Group Topic Covered balanced lifestyle;coping skills/lifestyle management;leisure exploration/use of leisure time;structured socialization   Group Session Detail OT Clinic: Pt actively participated in occupational therapy clinic to facilitate coping skill exploration, creative expression within personally meaningful activities, and clinical observation of social, cognitive, and kinesthetic performance skills.    Patient Response/Contribution able to recall/repeat info presented;cooperative with task;discussed personal experience with topic;expressed understanding of topic;listened actively   Patient Participation Detail independent to initiate, gather materials, sequence, and adjust to workspace demands as needed. Demonstrated good focus, planning, and problem solving for selected fine detailed task. Able to ask for assistance as needed, and appeared comfortable interacting with peers and staff. See also BEH Rehab Flowsheet

## 2022-11-28 NOTE — DISCHARGE INSTRUCTIONS
Behavioral Discharge Planning and Instructions    Summary: You were admitted on 11/24/2022  due to Depression and Anxiety.  You were treated by Dr. Zuñiga and discharged on 12/1/22 from station 30 to Home    Main Diagnosis:   1.  Posttraumatic stress disorder.    2.  Historical diagnosis of bipolar affective disorder, type 2, versus major depressive disorder, recurrent, moderate severity.  3.  Unspecified anxiety disorder.    4.  Cannabis use disorder, likely.    Health Care Follow-up:     Primary Care Appointment: Monday 12/5/22 at 7:30 AM   Provider: Porsha Byrnes PA-C  Location:   [place] [address]  Phone: [phone]     [facility] Intake Appointment Date/Time: Monday December 5 at 1 PM     Address:  Mental Health Services   [address] [place] [address], [address]  [place] [id number]    Phone: [phone] Medical Records Fax: [phone]   Bring your id, insurance card, and a mask.  During the Intake you will discuss PM Group options.      Attend all scheduled appointments with your outpatient providers. Call at least 24 hours in advance if you need to reschedule an appointment to ensure continued access to your outpatient providers.     Major Treatments, Procedures and Findings:  You were provided with: a psychiatric assessment, medication evaluation and/or management, group therapy, and milieu management    Symptoms to Report: feeling more aggressive, increased confusion, losing more sleep, mood getting worse, or thoughts of suicide    Early warning signs can include: increased depression or anxiety sleep disturbances increased thoughts or behaviors of suicide or self-harm  increased unusual thinking, such as paranoia or hearing voices    Safety and Wellness:  Take all medicines as directed.  Make no changes unless your doctor suggests them.      Follow treatment recommendations.  Refrain from alcohol and non-prescribed drugs.  If there is a concern for safety, call  "911.    Resources:   COPE:[phone]  Gabrielle Mojica: [phone]     National [place] on Mental Illness (www.mn.audrey.org): 275.983.5800 or 644-161-3536.  Suicide Awareness Voices of Education (SAVE) (www.save.org): 537-853-EVRG (7082)  National Suicide Prevention Line (www.mentalhealthmn.org): 083-302-WJXB (7142)  Mental Health Consumer/Survivor Network of [place] (www.mhcsn.net): 524.271.4673 or 561-101-7258  Mental Health Association of [place] (www.mentalhealth.org): 652.556.1627 or 690-376-0488  Self- Management and Recovery Training., SMART-- Toll free: 529.948.7929  [employer]SpeakingPal  Text 4 Life: txt \"LIFE\" to 85168 for immediate support and crisis intervention  Crisis text line: Text \"[place]\" to 961064. Free, confidential, 24/7.    General Medication Instructions:   See your medication sheet(s) for instructions.   Take all medicines as directed.  Make no changes unless your doctor suggests them.   Go to all your doctor visits.  Be sure to have all your required lab tests. This way, your medicines can be refilled on time.  Do not use any drugs not prescribed by your doctor.  Avoid alcohol.    Advance Directives:   Scanned document on file with [employer]? No scanned doc  Is document scanned? Pt states no documents  Honoring Choices Your Rights Handout: Informed and given  Was more information offered? Pt declined    The Treatment team has appreciated the opportunity to work with you. If you have any questions or concerns about your recent admission, you can contact the unit which can receive your call 24 hours a day, 7 days a week. They will be able to get in touch with a Provider if needed. The unit number is [phone].      "

## 2022-11-28 NOTE — PLAN OF CARE
Assessment/Intervention/Current Symtoms and Care Coordination:  CTC met with pt who signed CRISS for . Discussed discharge plan. Pt interested in DBT; also some OT though discussed that this is more difficult to obtain on an outpatient basis.      Discharge Plan or Goal:  Pending stabilization & development of a safe discharge plan.  Considerations include: home       Barriers to Discharge:  Patient requires further psychiatric stabilization due to current symptomology and Medication management with possible adjustments       Referral Status:  Pending; referral to DBT     Legal Status:  voluntary       Contacts:  Primary Care Provider:  Appt scheduled for 12/5/22 at 7:30 AM with   Porsha Byrnes PA-C  [facility]     Dental:  Appt scheduled for 1/3/23 at 4:10 pm  Annette Ruzicka, RDH  [id number] [facility] [address]  Phone: [phone]  Fax: [phone]      Upcoming Meetings and Dates/Important Information and next steps:  None current

## 2022-11-28 NOTE — PLAN OF CARE
Night Nursing Note   11/27/22 - 11/28/22      Jonelle was in bed at beginning of shift and appeared asleep.  Monitored q15 mins for safety.  Appeared to sleep throughout night without difficulty.  Continue to monitor, offer support and reassurance per care plan.    Slept 7 hrs.

## 2022-11-29 PROCEDURE — 99232 SBSQ HOSP IP/OBS MODERATE 35: CPT | Performed by: PSYCHIATRY & NEUROLOGY

## 2022-11-29 PROCEDURE — 124N000002 HC R&B MH UMMC

## 2022-11-29 PROCEDURE — 250N000013 HC RX MED GY IP 250 OP 250 PS 637: Performed by: PSYCHIATRY & NEUROLOGY

## 2022-11-29 PROCEDURE — G0177 OPPS/PHP; TRAIN & EDUC SERV: HCPCS

## 2022-11-29 RX ADMIN — PRAZOSIN HYDROCHLORIDE 1 MG: 1 CAPSULE ORAL at 19:49

## 2022-11-29 RX ADMIN — NICOTINE POLACRILEX 2 MG: 2 GUM, CHEWING ORAL at 18:15

## 2022-11-29 RX ADMIN — TRAZODONE HYDROCHLORIDE 50 MG: 50 TABLET ORAL at 19:49

## 2022-11-29 RX ADMIN — PROPRANOLOL HYDROCHLORIDE 10 MG: 10 TABLET ORAL at 11:15

## 2022-11-29 RX ADMIN — FLUOXETINE 20 MG: 20 CAPSULE ORAL at 08:05

## 2022-11-29 RX ADMIN — NICOTINE 7 MG/24 HR DAILY TRANSDERMAL PATCH 1 PATCH: at 08:05

## 2022-11-29 RX ADMIN — NICOTINE POLACRILEX 2 MG: 2 GUM, CHEWING ORAL at 13:45

## 2022-11-29 RX ADMIN — NICOTINE POLACRILEX 2 MG: 2 GUM, CHEWING ORAL at 11:32

## 2022-11-29 RX ADMIN — ALBUTEROL SULFATE 2 PUFF: 90 AEROSOL, METERED RESPIRATORY (INHALATION) at 11:15

## 2022-11-29 RX ADMIN — SERTRALINE HYDROCHLORIDE 25 MG: 25 TABLET ORAL at 08:05

## 2022-11-29 RX ADMIN — ACETAMINOPHEN 650 MG: 325 TABLET, FILM COATED ORAL at 11:32

## 2022-11-29 RX ADMIN — PRENATAL VITAMINS-IRON FUMARATE 27 MG IRON-FOLIC ACID 0.8 MG TABLET 1 TABLET: at 08:05

## 2022-11-29 RX ADMIN — NICOTINE POLACRILEX 2 MG: 2 GUM, CHEWING ORAL at 08:05

## 2022-11-29 ASSESSMENT — ACTIVITIES OF DAILY LIVING (ADL)
ADLS_ACUITY_SCORE: 44
DRESS: INDEPENDENT
LAUNDRY: WITH SUPERVISION
LAUNDRY: WITH SUPERVISION
HYGIENE/GROOMING: INDEPENDENT
ADLS_ACUITY_SCORE: 44
ADLS_ACUITY_SCORE: 44
DRESS: INDEPENDENT
ADLS_ACUITY_SCORE: 44
ORAL_HYGIENE: INDEPENDENT
ADLS_ACUITY_SCORE: 44
ADLS_ACUITY_SCORE: 44
ORAL_HYGIENE: INDEPENDENT
HYGIENE/GROOMING: INDEPENDENT
ADLS_ACUITY_SCORE: 44
ADLS_ACUITY_SCORE: 44
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
## 2022-11-29 NOTE — PLAN OF CARE
Night Nursing Note  11/28/22 - 11/29/22      Jonelle was in bed at beginning of shift and appeared asleep.  Monitored q15 mins for safety.  Appeared to sleep throughout night without difficulty.  Continue to monitor, offer support and reassurance per care plan.    Slept 7 hrs.

## 2022-11-29 NOTE — PLAN OF CARE
"Patient has spent majority of the shift in the milieu. Social with peers and staff. Affect has been bright and upbeat. Patient denies suicidal ideation and self injurious thoughts. Denies auditory and visual hallucinations. Requested prn propranolol for anxiety. Had a visit which went well. Med compliant. Cooperative on unit.     Patient evaluation continues. Assessed mood,anxiety,thoughts and behavior.     Patient gradually progressing towards goals.    Patient is encouraged to participate in groups and assisted to develop healthy coping skills.     VS reviewed: /83   Pulse 83   Temp 97.7  F (36.5  C)   Resp 18   Ht 1.6 m (5' 3\")   Wt 58.1 kg (128 lb)   SpO2 99%   BMI 22.67 kg/m      Length of stay: 4    Refer to daily team meeting notes for individualized plan of care. Nursing will continue to assess.                          "

## 2022-11-29 NOTE — PROGRESS NOTES
11/28/22 1900   Group Therapy Session   Group Attendance attended group session   Time Session Began 1700   Time Session Ended 1755   Total Time (minutes) 50   Total # Attendees 3   Group Type recreation   Group Topic Covered leisure exploration/use of leisure time   Group Session Detail TR leisure group   Patient Response/Contribution cooperative with task   Patient Participation Detail Pt actively participated in a structured Therapeutic Recreation group with a focus on leisure participation, stress reduction, and social engagement via an active group game. Pt remained focused and engaged throughout full duration of group.  Pt mood was calm and was appropriate with interactions.  Appropriately shared sense of humor with peers during the group and appeared to brighten with social interaction.
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
## 2022-11-30 PROCEDURE — 250N000013 HC RX MED GY IP 250 OP 250 PS 637: Performed by: PSYCHIATRY & NEUROLOGY

## 2022-11-30 PROCEDURE — G0177 OPPS/PHP; TRAIN & EDUC SERV: HCPCS

## 2022-11-30 PROCEDURE — 99232 SBSQ HOSP IP/OBS MODERATE 35: CPT | Performed by: PSYCHIATRY & NEUROLOGY

## 2022-11-30 PROCEDURE — 124N000002 HC R&B MH UMMC

## 2022-11-30 PROCEDURE — 90853 GROUP PSYCHOTHERAPY: CPT

## 2022-11-30 RX ORDER — PRAZOSIN HYDROCHLORIDE 1 MG/1
1 CAPSULE ORAL AT BEDTIME
Qty: 30 CAPSULE | Refills: 1 | Status: SHIPPED | OUTPATIENT
Start: 2022-11-30 | End: 2022-12-29

## 2022-11-30 RX ORDER — SERTRALINE HYDROCHLORIDE 25 MG/1
25 TABLET, FILM COATED ORAL DAILY
Qty: 1 TABLET | Refills: 0 | Status: SHIPPED | OUTPATIENT
Start: 2022-12-01 | End: 2023-01-02

## 2022-11-30 RX ORDER — CYCLOBENZAPRINE HCL 10 MG
10 TABLET ORAL 3 TIMES DAILY PRN
Qty: 60 TABLET | Refills: 1 | Status: SHIPPED | OUTPATIENT
Start: 2022-11-30 | End: 2023-01-02

## 2022-11-30 RX ORDER — LORAZEPAM 0.5 MG/1
0.5 TABLET ORAL DAILY PRN
Qty: 30 TABLET | Refills: 0
Start: 2022-11-30 | End: 2023-01-02

## 2022-11-30 RX ADMIN — PROPRANOLOL HYDROCHLORIDE 10 MG: 10 TABLET ORAL at 18:47

## 2022-11-30 RX ADMIN — NICOTINE POLACRILEX 2 MG: 2 GUM, CHEWING ORAL at 20:41

## 2022-11-30 RX ADMIN — PRAZOSIN HYDROCHLORIDE 1 MG: 1 CAPSULE ORAL at 20:42

## 2022-11-30 RX ADMIN — PRENATAL VITAMINS-IRON FUMARATE 27 MG IRON-FOLIC ACID 0.8 MG TABLET 1 TABLET: at 08:16

## 2022-11-30 RX ADMIN — SERTRALINE HYDROCHLORIDE 25 MG: 25 TABLET ORAL at 08:16

## 2022-11-30 RX ADMIN — NICOTINE 7 MG/24 HR DAILY TRANSDERMAL PATCH 1 PATCH: at 08:19

## 2022-11-30 RX ADMIN — FLUOXETINE 20 MG: 20 CAPSULE ORAL at 08:16

## 2022-11-30 RX ADMIN — NICOTINE POLACRILEX 2 MG: 2 GUM, CHEWING ORAL at 08:16

## 2022-11-30 RX ADMIN — TRAZODONE HYDROCHLORIDE 50 MG: 50 TABLET ORAL at 20:42

## 2022-11-30 RX ADMIN — NICOTINE POLACRILEX 2 MG: 2 GUM, CHEWING ORAL at 11:57

## 2022-11-30 ASSESSMENT — ACTIVITIES OF DAILY LIVING (ADL)
DRESS: SCRUBS (BEHAVIORAL HEALTH);INDEPENDENT
ADLS_ACUITY_SCORE: 44
LAUNDRY: UNABLE TO COMPLETE
ADLS_ACUITY_SCORE: 44
HYGIENE/GROOMING: HANDWASHING;SHOWER;INDEPENDENT
ADLS_ACUITY_SCORE: 44
ORAL_HYGIENE: INDEPENDENT
ADLS_ACUITY_SCORE: 44

## 2022-11-30 NOTE — PLAN OF CARE
Goal Outcome Evaluation:    Plan of Care Reviewed With: patient    Pt out in common areas most of the shift. Pt reports feeling much improved from admission. Pt reports mood is more stable, not crying or having sudden mood changes. Pt reports nightmares have gone away. Pt states feels hopefull about healing. Pt is expecting discharge tomorrow morning and is feeling excited about this. Pt denies any suicidal thoughts.

## 2022-11-30 NOTE — PLAN OF CARE
Patient slept for a total of 6 hours. She got up a couple of times to use the bathroom, but no concerns were voiced. We'll continue to monitor.
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
## 2022-11-30 NOTE — PLAN OF CARE
11/30/22 1206   Group Therapy Session   Group Attendance attended group session   Time Session Began 1015   Time Session Ended 1105   Total Time (minutes) 50   Total # Attendees 5   Group Type life skill;task skill;other (see comments)  (OT)   Group Topic Covered balanced lifestyle;coping skills/lifestyle management;leisure exploration/use of leisure time;structured socialization   Group Session Detail OT Clinic: Pt actively participated in occupational therapy clinic to facilitate coping skill exploration, creative expression within personally meaningful activities, and clinical observation of social, cognitive, and kinesthetic performance skills.   Patient Response/Contribution able to recall/repeat info presented;cooperative with task;discussed personal experience with topic;expressed understanding of topic;listened actively;offered helpful suggestions to peers   Patient Participation Detail independent to initiate, gather materials, sequence, and adjust to workspace demands as needed. Demonstrated good focus, planning, and problem solving for selected moderately intricate task. Able to ask for assistance as needed, and very social with peers and staff. Also talked about completed the project work at home due to discharge tomorrow and engaging in creative activities at home.      Goal Outcome Evaluation: progressing and ongoing

## 2022-11-30 NOTE — PLAN OF CARE
"Patient has spent majority of the shift in the milieu. Social with peers and staff. Patient denies suicidal ideation and self injurious thoughts. Denies anxiety and depression. Denies auditory and visual hallucinations. Med compliant. Ate dinner and snack.  visited which went well. Affect has been bright and upbeat.     Patient evaluation continues. Assessed mood,anxiety,thoughts and behavior.     Patient gradually progressing towards goals.    Patient is encouraged to participate in groups and assisted to develop healthy coping skills.     VS reviewed: /76 (BP Location: Left arm, Patient Position: Sitting, Cuff Size: Adult Regular)   Pulse 78   Temp 98  F (36.7  C) (Oral)   Resp 18   Ht 1.6 m (5' 3\")   Wt 60.3 kg (132 lb 14.4 oz)   SpO2 100%   BMI 23.54 kg/m      Length of stay: 5    Refer to daily team meeting notes for individualized plan of care. Nursing will continue to assess.                        "

## 2022-11-30 NOTE — PLAN OF CARE
Assessment/Intervention/Current Symtoms and Care Coordination:  Met with team, discussed pt progress, symptomology, and response to treatment. Pt is cooperative with care and taking medication as prescribed. Discussed the discharge plan and any potential impediments to discharge.  Pt is going to groups occasionally and is visible in the milieu.        Discharge Plan or Goal:  Pending stabilization & development of a safe discharge plan.  Considerations include: home        Barriers to Discharge:  Patient requires further psychiatric stabilization due to current symptomology and Medication management with possible adjustments        Referral Status:  Pending; referral to DBT     Legal Status:  voluntary         Contacts:  Primary Care Provider:  Appt scheduled for 12/5/22 at 7:30 AM with   Porsha Byrnes PA-C  [facility]     Dental:  Appt scheduled for 1/3/23 at 4:10 pm  Annette Ruzicka, RDH  [address] [facility] [address]  Phone: [phone]  Fax: [phone]      Upcoming Meetings and Dates/Important Information and next steps:  Pt to discharge tomorrow

## 2022-12-01 VITALS
TEMPERATURE: 98.4 F | OXYGEN SATURATION: 98 % | DIASTOLIC BLOOD PRESSURE: 85 MMHG | HEART RATE: 92 BPM | BODY MASS INDEX: 23.57 KG/M2 | SYSTOLIC BLOOD PRESSURE: 130 MMHG | HEIGHT: 63 IN | WEIGHT: 133 LBS | RESPIRATION RATE: 20 BRPM

## 2022-12-01 PROCEDURE — 99239 HOSP IP/OBS DSCHRG MGMT >30: CPT | Performed by: PSYCHIATRY & NEUROLOGY

## 2022-12-01 PROCEDURE — 250N000013 HC RX MED GY IP 250 OP 250 PS 637: Performed by: PSYCHIATRY & NEUROLOGY

## 2022-12-01 RX ADMIN — FLUOXETINE 20 MG: 20 CAPSULE ORAL at 08:40

## 2022-12-01 RX ADMIN — NICOTINE POLACRILEX 2 MG: 2 GUM, CHEWING ORAL at 09:25

## 2022-12-01 RX ADMIN — HYDROXYZINE HYDROCHLORIDE 25 MG: 25 TABLET, FILM COATED ORAL at 00:53

## 2022-12-01 RX ADMIN — ALBUTEROL SULFATE 2 PUFF: 90 AEROSOL, METERED RESPIRATORY (INHALATION) at 00:46

## 2022-12-01 RX ADMIN — SERTRALINE HYDROCHLORIDE 25 MG: 25 TABLET ORAL at 08:41

## 2022-12-01 RX ADMIN — TRAZODONE HYDROCHLORIDE 50 MG: 50 TABLET ORAL at 00:54

## 2022-12-01 RX ADMIN — PRENATAL VITAMINS-IRON FUMARATE 27 MG IRON-FOLIC ACID 0.8 MG TABLET 1 TABLET: at 08:40

## 2022-12-01 ASSESSMENT — ACTIVITIES OF DAILY LIVING (ADL)
ORAL_HYGIENE: INDEPENDENT
HYGIENE/GROOMING: HANDWASHING;SHOWER;INDEPENDENT
LAUNDRY: UNABLE TO COMPLETE
ADLS_ACUITY_SCORE: 44
ADLS_ACUITY_SCORE: 44
DRESS: STREET CLOTHES;SCRUBS (BEHAVIORAL HEALTH);INDEPENDENT
ADLS_ACUITY_SCORE: 44

## 2022-12-01 NOTE — PLAN OF CARE
Goal Outcome Evaluation:    Plan of Care Reviewed With: patient    Pt was discharged to home in the company of her  @ 1045 today with all belonging and 30 day supply of medications. Pt denies any suicidal thoughts. Pt reports mood more stable. Pt reports feeling hopeful about after care plan. Writer reviewed all discharge instructions including all medication instructions and aftercare appointments.
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
## 2022-12-01 NOTE — PLAN OF CARE
"Patient has spent majority of the shift in the lounge. Patient denies suicidal ideation and self injurious thoughts. Requested prn propranolol for anxiety. Affect has been bright. Social with peers. Ate dinner and snack. Mother and sister visited which went well. Med compliant. Looking forward to discharge tomorrow.     Patient evaluation continues. Assessed mood,anxiety,thoughts and behavior.     Patient gradually progressing towards goals.    Patient is encouraged to participate in groups and assisted to develop healthy coping skills.     VS reviewed: /87 (BP Location: Right arm, Patient Position: Sitting, Cuff Size: Adult Regular)   Pulse 73   Temp 98.3  F (36.8  C) (Oral)   Resp 16   Ht 1.6 m (5' 3\")   Wt 60.3 kg (132 lb 14.4 oz)   SpO2 99%   BMI 23.54 kg/m      Length of stay: 6    Refer to daily team meeting notes for individualized plan of care. Nursing will continue to assess.                          "

## 2022-12-01 NOTE — PROGRESS NOTES
11/30/22 2200   Group Therapy Session   Group Attendance attended group session   Time Session Began 1725   Time Session Ended 1810   Total Time (minutes) 45   Total # Attendees 4   Group Type psychotherapeutic   Group Topic Covered coping skills/lifestyle management   Group Session Detail journaling and healthy daily routine and coping   Patient Response/Contribution cooperative with task;discussed personal experience with topic;expressed readiness to alter behaviors   Pt reported feeling fine/ sleepy. She spoke about taking care of everyone before herself. She is hoping to talk to her  and mother about her own self care needs upon discharge. There was a peer who was very combative with her, Jonelle was being friendly. She handled this well and did not react negatively. The peer left group and did not return.
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
## 2022-12-14 ENCOUNTER — LAB REQUISITION (OUTPATIENT)
Dept: LAB | Facility: CLINIC | Age: 37
End: 2022-12-14

## 2022-12-14 DIAGNOSIS — Z11.59 ENCOUNTER FOR SCREENING FOR OTHER VIRAL DISEASES: ICD-10-CM

## 2022-12-14 DIAGNOSIS — Z91.89 OTHER SPECIFIED PERSONAL RISK FACTORS, NOT ELSEWHERE CLASSIFIED: ICD-10-CM

## 2022-12-14 DIAGNOSIS — A53.9 SYPHILIS, UNSPECIFIED: ICD-10-CM

## 2022-12-14 PROCEDURE — 86780 TREPONEMA PALLIDUM: CPT | Performed by: OBSTETRICS & GYNECOLOGY

## 2022-12-14 PROCEDURE — 86803 HEPATITIS C AB TEST: CPT | Performed by: OBSTETRICS & GYNECOLOGY

## 2022-12-14 PROCEDURE — 87491 CHLMYD TRACH DNA AMP PROBE: CPT | Performed by: OBSTETRICS & GYNECOLOGY

## 2022-12-14 PROCEDURE — 87591 N.GONORRHOEAE DNA AMP PROB: CPT | Performed by: OBSTETRICS & GYNECOLOGY

## 2022-12-14 PROCEDURE — 87340 HEPATITIS B SURFACE AG IA: CPT | Performed by: OBSTETRICS & GYNECOLOGY

## 2022-12-15 ENCOUNTER — LAB REQUISITION (OUTPATIENT)
Dept: LAB | Facility: CLINIC | Age: 37
End: 2022-12-15

## 2022-12-15 DIAGNOSIS — N92.1 EXCESSIVE AND FREQUENT MENSTRUATION WITH IRREGULAR CYCLE: ICD-10-CM

## 2022-12-15 LAB
C TRACH DNA SPEC QL PROBE+SIG AMP: NEGATIVE
HBV SURFACE AG SERPL QL IA: NONREACTIVE
HCV AB SERPL QL IA: NONREACTIVE
N GONORRHOEA DNA SPEC QL NAA+PROBE: NEGATIVE
T PALLIDUM AB SER QL: NONREACTIVE

## 2022-12-15 PROCEDURE — 84443 ASSAY THYROID STIM HORMONE: CPT | Performed by: NURSE PRACTITIONER

## 2022-12-19 LAB — TSH SERPL DL<=0.005 MIU/L-ACNC: 2.55 UIU/ML (ref 0.3–4.2)

## 2022-12-28 ENCOUNTER — TELEPHONE (OUTPATIENT)
Dept: FAMILY MEDICINE | Facility: CLINIC | Age: 37
End: 2022-12-28

## 2022-12-28 DIAGNOSIS — F43.10 PTSD (POST-TRAUMATIC STRESS DISORDER): ICD-10-CM

## 2022-12-28 DIAGNOSIS — F41.9 ANXIETY: ICD-10-CM

## 2022-12-28 NOTE — TELEPHONE ENCOUNTER
Medication Question or Refill    Contacts       Type Contact Phone/Fax    12/28/2022 04:16 PM CST Phone (Incoming) Khan, Jonelle L (Self) [phone] (M)          What medication are you calling about (include dose and sig)?: Fluoxetine and Prazosin    Controlled Substance Agreement on file:   CSA -- Patient Level:    CSA: None found at the patient level.       Who prescribed the medication?: Kristal Saravia CNP    Do you need a refill? Yes: Fluoxetine and Prazosin      When did you use the medication last? 12/28/2022    Patient offered an appointment? Yes: pt needs refill before appt on 01/02/2023     Do you have any questions or concerns?  No    Preferred Pharmacy:   [facility] [place], [place] - [address] [address] [address]  Phone: [phone] Fax: [phone]    [id number] [place], [place] - [address]  [address] [facility] [address] [facility] [address]  Phone: [phone] Fax: [phone]    [facility] [address] [address]  Phone: [phone] Fax: [phone]      Could we [facility] this information to you [facility] or would you prefer to receive a phone call?:   Patient would prefer a phone call   Okay to leave a detailed message?: Yes at Home number on file [phone] (home)

## 2022-12-29 RX ORDER — PRAZOSIN HYDROCHLORIDE 1 MG/1
1 CAPSULE ORAL AT BEDTIME
Qty: 30 CAPSULE | Refills: 1 | Status: SHIPPED | OUTPATIENT
Start: 2022-12-29 | End: 2023-01-02

## 2023-01-02 ENCOUNTER — VIRTUAL VISIT (OUTPATIENT)
Dept: FAMILY MEDICINE | Facility: CLINIC | Age: 38
End: 2023-01-02
Payer: COMMERCIAL

## 2023-01-02 DIAGNOSIS — M54.50 ACUTE BILATERAL LOW BACK PAIN WITHOUT SCIATICA: ICD-10-CM

## 2023-01-02 DIAGNOSIS — F41.9 ANXIETY: ICD-10-CM

## 2023-01-02 DIAGNOSIS — F43.10 PTSD (POST-TRAUMATIC STRESS DISORDER): ICD-10-CM

## 2023-01-02 DIAGNOSIS — F41.0 PANIC ATTACK: ICD-10-CM

## 2023-01-02 PROCEDURE — 99213 OFFICE O/P EST LOW 20 MIN: CPT | Mod: 95 | Performed by: NURSE PRACTITIONER

## 2023-01-02 RX ORDER — PRAZOSIN HYDROCHLORIDE 1 MG/1
1 CAPSULE ORAL AT BEDTIME
Qty: 90 CAPSULE | Refills: 3 | Status: SHIPPED | OUTPATIENT
Start: 2023-01-02

## 2023-01-02 RX ORDER — PROPRANOLOL HYDROCHLORIDE 10 MG/1
10 TABLET ORAL 3 TIMES DAILY
Qty: 90 TABLET | Refills: 3 | Status: SHIPPED | OUTPATIENT
Start: 2023-01-02 | End: 2024-04-29

## 2023-01-02 RX ORDER — CYCLOBENZAPRINE HCL 10 MG
10 TABLET ORAL 3 TIMES DAILY PRN
Qty: 180 TABLET | Refills: 1 | Status: SHIPPED | OUTPATIENT
Start: 2023-01-02 | End: 2023-08-31

## 2023-01-02 RX ORDER — FLUOXETINE 40 MG/1
40 CAPSULE ORAL DAILY
Qty: 90 CAPSULE | Refills: 3 | Status: SHIPPED | OUTPATIENT
Start: 2023-01-02 | End: 2024-01-23

## 2023-01-02 NOTE — PROGRESS NOTES
Jonelle is a 37 year old who is being evaluated via a billable video visit.      How would you like to obtain your AVS? MyChart  If the video visit is dropped, the invitation should be resent by: Text to cell phone: [phone]  Will anyone else be joining your video visit? No        Assessment & Plan     Anxiety  Refill provided. Therapy referral provided.  - FLUoxetine (PROZAC) 40 MG capsule  Dispense: 90 capsule; Refill: 3  - Adult Mental Health  Referral    PTSD (post-traumatic stress disorder)  Continue prazosin at night.  - prazosin (MINIPRESS) 1 MG capsule  Dispense: 90 capsule; Refill: 3  - Adult [facility]  Referral    Panic attack  Prn use propranolol.  - propranolol (INDERAL) 10 MG tablet  Dispense: 90 tablet; Refill: 3  - [facility]    Acute bilateral low back pain without sciatica  Schedule with chiropractor.  - cyclobenzaprine (FLEXERIL) 10 MG tablet  Dispense: 180 tablet; Refill: 1      Prescription drug management       Return in about 3 months (around 4/2/2023) for Medication recheck.    Kristal Saravia, [facility]   Jonelle is a 37 year old accompanied by her self, presenting for the following health issues:  Recheck Medication      HPI     Medication Followup of Prozac    Taking Medication as prescribed: yes    Side Effects:  None    Medication Helping Symptoms:  yes      PHQ 1/13/2022 9/16/2022 10/14/2022   PHQ-9 Total Score 5 14 24   Q9: Thoughts of better off dead/self-harm past 2 weeks Not at all Not at all Not at all     LUIS FELIPE-7 SCORE 1/13/2022 9/16/2022 10/14/2022   Total Score - - -   Total Score 0 (minimal anxiety) 5 (mild anxiety) -   Total Score 0 5 18     Will update scores today as well.     Review of Systems   Constitutional, HEENT, cardiovascular, pulmonary, GI, , musculoskeletal, neuro, skin, endocrine and psych systems are negative, except as otherwise noted.      Objective            Vitals:  No vitals were obtained today due to virtual visit.    Physical Exam   GENERAL: Healthy, alert and no distress  EYES: Eyes grossly normal to inspection.  No discharge or erythema, or obvious scleral/conjunctival abnormalities.  RESP: No audible wheeze, cough, or visible cyanosis.  No visible retractions or increased work of breathing.    SKIN: Visible skin clear. No significant rash, abnormal pigmentation or lesions.  NEURO: Cranial nerves grossly intact.  Mentation and speech appropriate for age.  PSYCH: Mentation appears normal, affect normal/bright, judgement and insight intact, normal speech and appearance well-groomed.        Video-Visit Details    Type of service:  Video Visit     Originating Location (pt. Location): Home  Distant Location (provider location):  On-site  Platform used for Video Visit: [employer]

## 2023-01-27 ENCOUNTER — MYC MEDICAL ADVICE (OUTPATIENT)
Dept: FAMILY MEDICINE | Facility: CLINIC | Age: 38
End: 2023-01-27
Payer: COMMERCIAL

## 2023-01-27 DIAGNOSIS — F41.9 ANXIETY: Primary | ICD-10-CM

## 2023-01-27 DIAGNOSIS — F43.10 PTSD (POST-TRAUMATIC STRESS DISORDER): ICD-10-CM

## 2023-01-31 NOTE — TELEPHONE ENCOUNTER
Please see my chart message below     Please review and advise     Thank you     Tawana Foley RN, BSN  [facility]

## 2023-02-06 RX ORDER — TRAZODONE HYDROCHLORIDE 50 MG/1
50 TABLET, FILM COATED ORAL AT BEDTIME
Qty: 90 TABLET | Refills: 1 | Status: SHIPPED | OUTPATIENT
Start: 2023-02-06 | End: 2023-07-14

## 2023-03-25 ENCOUNTER — HEALTH MAINTENANCE LETTER (OUTPATIENT)
Age: 38
End: 2023-03-25

## 2023-04-24 ENCOUNTER — ANCILLARY PROCEDURE (OUTPATIENT)
Dept: GENERAL RADIOLOGY | Facility: CLINIC | Age: 38
End: 2023-04-24
Attending: FAMILY MEDICINE
Payer: COMMERCIAL

## 2023-04-24 ENCOUNTER — OFFICE VISIT (OUTPATIENT)
Dept: URGENT CARE | Facility: URGENT CARE | Age: 38
End: 2023-04-24
Payer: COMMERCIAL

## 2023-04-24 VITALS
SYSTOLIC BLOOD PRESSURE: 116 MMHG | DIASTOLIC BLOOD PRESSURE: 82 MMHG | RESPIRATION RATE: 20 BRPM | HEART RATE: 110 BPM | TEMPERATURE: 98.4 F | OXYGEN SATURATION: 99 %

## 2023-04-24 DIAGNOSIS — J02.0 STREPTOCOCCAL PHARYNGITIS: ICD-10-CM

## 2023-04-24 DIAGNOSIS — J45.20 MILD INTERMITTENT ASTHMA WITHOUT COMPLICATION: ICD-10-CM

## 2023-04-24 DIAGNOSIS — Z72.0 TOBACCO ABUSE: ICD-10-CM

## 2023-04-24 DIAGNOSIS — R05.3 PERSISTENT COUGH FOR 3 WEEKS OR LONGER: Primary | ICD-10-CM

## 2023-04-24 DIAGNOSIS — J02.9 SORETHROAT: ICD-10-CM

## 2023-04-24 LAB — DEPRECATED S PYO AG THROAT QL EIA: POSITIVE

## 2023-04-24 PROCEDURE — 87880 STREP A ASSAY W/OPTIC: CPT | Performed by: FAMILY MEDICINE

## 2023-04-24 PROCEDURE — 99214 OFFICE O/P EST MOD 30 MIN: CPT | Performed by: FAMILY MEDICINE

## 2023-04-24 PROCEDURE — 71046 X-RAY EXAM CHEST 2 VIEWS: CPT | Mod: TC | Performed by: RADIOLOGY

## 2023-04-24 RX ORDER — BENZONATATE 100 MG/1
100 CAPSULE ORAL 3 TIMES DAILY PRN
Qty: 30 CAPSULE | Refills: 0 | Status: SHIPPED | OUTPATIENT
Start: 2023-04-24 | End: 2023-08-31

## 2023-04-24 RX ORDER — AMOXICILLIN 500 MG/1
500 CAPSULE ORAL 2 TIMES DAILY
Qty: 20 CAPSULE | Refills: 0 | Status: SHIPPED | OUTPATIENT
Start: 2023-04-24 | End: 2023-05-04

## 2023-04-24 NOTE — PROGRESS NOTES
Chief Complaint   Patient presents with     Urgent Care     URI     Patient has been sick for 2 months-Facial pain, cough, headache, jaw pain, and painful lymph nodes        Jonelle was seen today for urgent care and uri.    Diagnoses and all orders for this visit:    Persistent cough for 3 weeks or longer  -     XR Chest 2 Views  -     benzonatate (TESSALON) 100 MG capsule; Take 1 capsule (100 mg) by mouth 3 times daily as needed    Sorethroat  -     Streptococcus A Rapid Screen w/Reflex to PCR - Clinic Collect    Streptococcal pharyngitis  -     amoxicillin (AMOXIL) 500 MG capsule; Take 1 capsule (500 mg) by mouth 2 times daily for 10 days    Mild intermittent asthma without complication    Tobacco abuse      Jonelle Khan is a 37 year old female who presents with sore throat and clinical evidence of pharyngitis.  The rapid strep test is positive. I see no clinical evidence of  peritonsillar abscess, retropharyngeal abscess,The patient's symptoms are consistent with streptococcal pharyngitis.  I have recommended treatment with antibiotics and analgesics.  Return if increasing pain, change in voice, neck pain, vomiting, fever, or shortness of breath. Follow-up with primary physician if not improving in 3-5 days.  Discussed about quitting smoking   Xray done as cough for more than 2 months   Discussed symptoms seem to be related to bronchitis , xray did not show any infiltrate     Results for orders placed or performed in visit on 04/24/23   Streptococcus A Rapid Screen w/Reflex to PCR - Clinic Collect     Status: Abnormal    Specimen: Throat; Swab   Result Value Ref Range    Group A Strep antigen Positive (A) Negative         SUBJECTIVE:  Jonelle Khan is a 37 year old female history of smoking who presents to the clinic today with a chief complaint of cough which is persistent for last 2 months on and off and also has been noticing worsening sore throat and swollen glands over the last 1 week time.  Her  cough is described as productive of yellow sputum.    The patient's symptoms are moderate and worsening.  Associated symptoms include nasal congestion and sore throat. The patient's symptoms are exacerbated by no particular triggers  Patient has been using Tylenol  to improve symptoms.    Past Medical History:   Diagnosis Date     Anxiety      Asthma      Depressive disorder      Kidney infection 2010     LSIL (low grade squamous intraepithelial lesion) on Pap smear 7/20/10    resolved     Mild major depression (H)      Sleep apnea     no cpap       Current Outpatient Medications   Medication Sig Dispense Refill     acetaminophen (TYLENOL) 500 MG tablet Take 500-1,000 mg by mouth every 6 hours as needed for mild pain       albuterol (PROAIR HFA/PROVENTIL HFA/VENTOLIN HFA) 108 (90 Base) MCG/ACT inhaler Inhale 2 puffs into the lungs every 6 hours 18 g 1     amoxicillin (AMOXIL) 500 MG capsule Take 1 capsule (500 mg) by mouth 2 times daily for 10 days 20 capsule 0     benzonatate (TESSALON) 100 MG capsule Take 1 capsule (100 mg) by mouth 3 times daily as needed 30 capsule 0     cyclobenzaprine (FLEXERIL) 10 MG tablet Take 1 tablet (10 mg) by mouth 3 times daily as needed for muscle spasms 180 tablet 1     FLUoxetine (PROZAC) 40 MG capsule Take 1 capsule (40 mg) by mouth daily 90 capsule 3     nicotine (NICORETTE) 2 MG gum Place 1 each (2 mg) inside cheek every hour as needed for other (nicotine withdrawal symptoms) 220 each 0     prazosin (MINIPRESS) 1 MG capsule Take 1 capsule (1 mg) by mouth At Bedtime 90 capsule 3     Prenatal Vit-Fe Fumarate-FA (PRENATAL COMPLETE) 14-0.4 MG TABS Take 1 tablet by mouth daily       traZODone (DESYREL) 50 MG tablet Take 1 tablet (50 mg) by mouth At Bedtime 90 tablet 1     propranolol (INDERAL) 10 MG tablet Take 1 tablet (10 mg) by mouth 3 times daily (Patient not taking: Reported on 4/24/2023) 90 tablet 3     triamcinolone (KENALOG) 0.1 % external cream Apply topically 2 times daily  (Patient not taking: Reported on 4/24/2023) 30 g 1       Social History     Tobacco Use     Smoking status: Every Day     Types: Vaping Device     Smokeless tobacco: Never     Tobacco comments:     occasional   Vaping Use     Vaping status: Former   Substance Use Topics     Alcohol use: Not Currently     Alcohol/week: 1.0 standard drink of alcohol     Types: 1 Cans of beer per week       ROS  Review of systems negative except as stated above.    OBJECTIVE:  /82   Pulse 110   Temp 98.4  F (36.9  C) (Oral)   Resp 20   SpO2 99%   Breastfeeding No   GENERAL APPEARANCE: healthy, alert and no distress  EYES: EOMI,  PERRL, conjunctiva clear  HENT: ear canals and TM's normal.  Nose and mouth without ulcers, throat showed moderate erythema and no lesions  NECK: supple, nontender, no lymphadenopathy  RESP: lungs clear to auscultation - no rales, rhonchi or wheezes  CV: regular rates and rhythm, normal S1 S2, no murmur noted  SKIN: no suspicious lesions or rashes  PSYCH: mentation appears normal    Shraddha Oliva MD

## 2023-07-25 ENCOUNTER — OFFICE VISIT (OUTPATIENT)
Dept: URGENT CARE | Facility: URGENT CARE | Age: 38
End: 2023-07-25
Payer: COMMERCIAL

## 2023-07-25 VITALS
BODY MASS INDEX: 27.1 KG/M2 | SYSTOLIC BLOOD PRESSURE: 130 MMHG | TEMPERATURE: 97.8 F | WEIGHT: 153 LBS | DIASTOLIC BLOOD PRESSURE: 84 MMHG | HEART RATE: 117 BPM

## 2023-07-25 DIAGNOSIS — J45.21 MILD INTERMITTENT ASTHMA WITH EXACERBATION: ICD-10-CM

## 2023-07-25 DIAGNOSIS — B34.9 VIRAL SYNDROME: Primary | ICD-10-CM

## 2023-07-25 DIAGNOSIS — F41.9 ANXIETY: ICD-10-CM

## 2023-07-25 DIAGNOSIS — R07.0 THROAT PAIN: ICD-10-CM

## 2023-07-25 LAB
DEPRECATED S PYO AG THROAT QL EIA: NEGATIVE
GROUP A STREP BY PCR: NOT DETECTED

## 2023-07-25 PROCEDURE — 87651 STREP A DNA AMP PROBE: CPT | Performed by: PHYSICIAN ASSISTANT

## 2023-07-25 PROCEDURE — 87635 SARS-COV-2 COVID-19 AMP PRB: CPT | Performed by: PHYSICIAN ASSISTANT

## 2023-07-25 PROCEDURE — 99214 OFFICE O/P EST MOD 30 MIN: CPT | Performed by: PHYSICIAN ASSISTANT

## 2023-07-25 RX ORDER — BENZONATATE 100 MG/1
100 CAPSULE ORAL 3 TIMES DAILY PRN
Qty: 12 CAPSULE | Refills: 0 | Status: SHIPPED | OUTPATIENT
Start: 2023-07-25 | End: 2024-09-16

## 2023-07-25 NOTE — PROGRESS NOTES
Assessment/Plan:    No acute distress or toxicity noted. Lungs CTAB, no signs of pneumonia. Strep negative. Suspect viral illness. Supportive treatments discussed- continue use of over the counter treatments such as ibuprofen, acetaminophen, guaifenesin as needed. Also Rx Tessalon.   Continue albuterol PRN for asthma exacerbation. No need for corticosteroids at this time.    Discussed that symptoms do overlap with possible COVID-19, and patient was tested for this today. Isolate while awaiting test results. If positive, will be contacted regarding treatment (if high risk).    Increased anxiety recently. Advised requesting lorazepam refill through her PCP as we don't generally refill these types of medications through .    See patient instructions below.  At the end of the encounter, I discussed results, diagnosis, medications. Discussed red flags for immediate return to clinic/ER, as well as indications for follow up if no improvement. Patient understood and agreed to plan. Patient was stable for discharge.      ICD-10-CM    1. Viral syndrome  B34.9 benzonatate (TESSALON) 100 MG capsule      2. Throat pain  R07.0 Streptococcus A Rapid Screen w/Reflex to PCR - Clinic Collect     Group A Streptococcus PCR Throat Swab      3. Anxiety  F41.9       4. Mild intermittent asthma with exacerbation  J45.21             Return in about 1 week (around 8/1/2023) for Follow up w/ primary care provider if not better.    IBAN Major, ZURDO  [facility]    ------------------------------------------------------------------------------------------------------------------------------------------------------------------------  HPI:  Jonelle Khan is a 37 year old female who presents for evaluation of cough, sore throat, chills, and diarrhea onset 4 days ago. No treatments tried. Patient reports no fever, myalgias, nasal congestion, loss of sense of taste or smell, headache, chest pain,  shortness of breath, abdominal pain, nausea, vomiting, rash, or any other symptoms. No known sick contacts/COVID exposure.     She has a hx of asthma, has been using albuterol inhaler for intermittent chest tightness, which does help.    She has a hx of anxiety and feels like she has been having more panic attack type symptoms recently. Takes lorazepam for this as needed, has a couple doses left, requests a refill.       Past Medical History:   Diagnosis Date     Anxiety      Asthma      Depressive disorder      Kidney infection 2010     LSIL (low grade squamous intraepithelial lesion) on Pap smear 7/20/10    resolved     Mild major depression (H)      Sleep apnea     no cpap       Vitals:    07/25/23 1245   BP: 130/84   BP Location: Right arm   Patient Position: Sitting   Cuff Size: Adult Regular   Pulse: 117   Temp: 97.8  F (36.6  C)   TempSrc: Tympanic   Weight: 69.4 kg (153 lb)       Physical Exam  Vitals and nursing note reviewed.   HENT:      Right Ear: Tympanic membrane normal.      Left Ear: Tympanic membrane normal.      Mouth/Throat:      Mouth: Mucous membranes are moist.      Pharynx: Oropharynx is clear.   Cardiovascular:      Rate and Rhythm: Regular rhythm. Tachycardia present.      Heart sounds: Normal heart sounds.   Pulmonary:      Effort: Pulmonary effort is normal.      Breath sounds: Normal breath sounds.   Neurological:      Mental Status: She is alert.         Labs/Imaging:  Results for orders placed or performed in visit on 07/25/23 (from the past 24 hour(s))   Streptococcus A Rapid Screen w/Reflex to PCR - Clinic Collect    Specimen: Throat; Swab   Result Value Ref Range    Group A Strep antigen Negative Negative     No results found for this or any previous visit (from the past 24 hour(s)).      There are no Patient Instructions on file for this visit.

## 2023-07-25 NOTE — Clinical Note
Jonelle was in urgent care today & asked that I send you a note to see if you'd be willing to refill her lorazepam for increased anxiety recently.

## 2023-07-26 LAB — SARS-COV-2 RNA RESP QL NAA+PROBE: NEGATIVE

## 2023-08-31 ENCOUNTER — OFFICE VISIT (OUTPATIENT)
Dept: FAMILY MEDICINE | Facility: CLINIC | Age: 38
End: 2023-08-31
Payer: COMMERCIAL

## 2023-08-31 VITALS
TEMPERATURE: 98.8 F | BODY MASS INDEX: 26.75 KG/M2 | HEART RATE: 70 BPM | RESPIRATION RATE: 12 BRPM | DIASTOLIC BLOOD PRESSURE: 78 MMHG | WEIGHT: 151 LBS | HEIGHT: 63 IN | SYSTOLIC BLOOD PRESSURE: 132 MMHG | OXYGEN SATURATION: 99 %

## 2023-08-31 DIAGNOSIS — Z00.00 ROUTINE GENERAL MEDICAL EXAMINATION AT A HEALTH CARE FACILITY: Primary | ICD-10-CM

## 2023-08-31 DIAGNOSIS — Z72.0 TOBACCO ABUSE: ICD-10-CM

## 2023-08-31 DIAGNOSIS — F41.0 PANIC ATTACK: ICD-10-CM

## 2023-08-31 DIAGNOSIS — J45.20 MILD INTERMITTENT ASTHMA WITHOUT COMPLICATION: ICD-10-CM

## 2023-08-31 DIAGNOSIS — Z13.220 SCREENING CHOLESTEROL LEVEL: ICD-10-CM

## 2023-08-31 DIAGNOSIS — Z13.29 THYROID DISORDER SCREENING: ICD-10-CM

## 2023-08-31 DIAGNOSIS — F17.200 NICOTINE DEPENDENCE, UNCOMPLICATED, UNSPECIFIED NICOTINE PRODUCT TYPE: ICD-10-CM

## 2023-08-31 DIAGNOSIS — Z13.0 SCREENING FOR DEFICIENCY ANEMIA: ICD-10-CM

## 2023-08-31 DIAGNOSIS — Z11.3 ROUTINE SCREENING FOR STI (SEXUALLY TRANSMITTED INFECTION): ICD-10-CM

## 2023-08-31 DIAGNOSIS — Z13.1 SCREENING FOR DIABETES MELLITUS: ICD-10-CM

## 2023-08-31 LAB
ALBUMIN SERPL BCG-MCNC: 4.4 G/DL (ref 3.5–5.2)
ALP SERPL-CCNC: 58 U/L (ref 35–104)
ALT SERPL W P-5'-P-CCNC: 19 U/L (ref 0–50)
ANION GAP SERPL CALCULATED.3IONS-SCNC: 11 MMOL/L (ref 7–15)
AST SERPL W P-5'-P-CCNC: 27 U/L (ref 0–45)
BILIRUB SERPL-MCNC: 0.3 MG/DL
BUN SERPL-MCNC: 5.5 MG/DL (ref 6–20)
CALCIUM SERPL-MCNC: 9.9 MG/DL (ref 8.6–10)
CHLORIDE SERPL-SCNC: 103 MMOL/L (ref 98–107)
CHOLEST SERPL-MCNC: 148 MG/DL
CREAT SERPL-MCNC: 0.68 MG/DL (ref 0.51–0.95)
DEPRECATED HCO3 PLAS-SCNC: 24 MMOL/L (ref 22–29)
ERYTHROCYTE [DISTWIDTH] IN BLOOD BY AUTOMATED COUNT: 12.2 % (ref 10–15)
GFR SERPL CREATININE-BSD FRML MDRD: >90 ML/MIN/1.73M2
GLUCOSE SERPL-MCNC: 91 MG/DL (ref 70–99)
HCT VFR BLD AUTO: 41.5 % (ref 35–47)
HDLC SERPL-MCNC: 51 MG/DL
HGB BLD-MCNC: 14 G/DL (ref 11.7–15.7)
LDLC SERPL CALC-MCNC: 83 MG/DL
MCH RBC QN AUTO: 30.2 PG (ref 26.5–33)
MCHC RBC AUTO-ENTMCNC: 33.7 G/DL (ref 31.5–36.5)
MCV RBC AUTO: 90 FL (ref 78–100)
NONHDLC SERPL-MCNC: 97 MG/DL
PLATELET # BLD AUTO: 260 10E3/UL (ref 150–450)
POTASSIUM SERPL-SCNC: 3.8 MMOL/L (ref 3.4–5.3)
PROT SERPL-MCNC: 7.3 G/DL (ref 6.4–8.3)
RBC # BLD AUTO: 4.63 10E6/UL (ref 3.8–5.2)
SODIUM SERPL-SCNC: 138 MMOL/L (ref 136–145)
TRIGL SERPL-MCNC: 72 MG/DL
TSH SERPL DL<=0.005 MIU/L-ACNC: 2.44 UIU/ML (ref 0.3–4.2)
WBC # BLD AUTO: 6.6 10E3/UL (ref 4–11)

## 2023-08-31 PROCEDURE — 86695 HERPES SIMPLEX TYPE 1 TEST: CPT | Performed by: NURSE PRACTITIONER

## 2023-08-31 PROCEDURE — 87591 N.GONORRHOEAE DNA AMP PROB: CPT | Performed by: NURSE PRACTITIONER

## 2023-08-31 PROCEDURE — 85027 COMPLETE CBC AUTOMATED: CPT | Performed by: NURSE PRACTITIONER

## 2023-08-31 PROCEDURE — 80061 LIPID PANEL: CPT | Performed by: NURSE PRACTITIONER

## 2023-08-31 PROCEDURE — 96127 BRIEF EMOTIONAL/BEHAV ASSMT: CPT | Performed by: NURSE PRACTITIONER

## 2023-08-31 PROCEDURE — 80053 COMPREHEN METABOLIC PANEL: CPT | Performed by: NURSE PRACTITIONER

## 2023-08-31 PROCEDURE — 87491 CHLMYD TRACH DNA AMP PROBE: CPT | Performed by: NURSE PRACTITIONER

## 2023-08-31 PROCEDURE — 86696 HERPES SIMPLEX TYPE 2 TEST: CPT | Performed by: NURSE PRACTITIONER

## 2023-08-31 PROCEDURE — 86706 HEP B SURFACE ANTIBODY: CPT | Performed by: NURSE PRACTITIONER

## 2023-08-31 PROCEDURE — 36415 COLL VENOUS BLD VENIPUNCTURE: CPT | Performed by: NURSE PRACTITIONER

## 2023-08-31 PROCEDURE — 86780 TREPONEMA PALLIDUM: CPT | Performed by: NURSE PRACTITIONER

## 2023-08-31 PROCEDURE — 99213 OFFICE O/P EST LOW 20 MIN: CPT | Mod: 25 | Performed by: NURSE PRACTITIONER

## 2023-08-31 PROCEDURE — 86803 HEPATITIS C AB TEST: CPT | Performed by: NURSE PRACTITIONER

## 2023-08-31 PROCEDURE — 87389 HIV-1 AG W/HIV-1&-2 AB AG IA: CPT | Performed by: NURSE PRACTITIONER

## 2023-08-31 PROCEDURE — 84443 ASSAY THYROID STIM HORMONE: CPT | Performed by: NURSE PRACTITIONER

## 2023-08-31 PROCEDURE — 99395 PREV VISIT EST AGE 18-39: CPT | Performed by: NURSE PRACTITIONER

## 2023-08-31 RX ORDER — MONTELUKAST SODIUM 10 MG/1
10 TABLET ORAL AT BEDTIME
Qty: 90 TABLET | Refills: 1 | Status: SHIPPED | OUTPATIENT
Start: 2023-08-31 | End: 2023-09-26

## 2023-08-31 RX ORDER — LORAZEPAM 0.5 MG/1
0.5 TABLET ORAL EVERY 6 HOURS PRN
Qty: 30 TABLET | Refills: 0 | Status: SHIPPED | OUTPATIENT
Start: 2023-08-31

## 2023-08-31 ASSESSMENT — ENCOUNTER SYMPTOMS
DIZZINESS: 0
MYALGIAS: 0
FREQUENCY: 1
NERVOUS/ANXIOUS: 1
DYSURIA: 0
FEVER: 0
COUGH: 0
EYE PAIN: 0
PARESTHESIAS: 0
JOINT SWELLING: 0
NAUSEA: 0
HEADACHES: 1
BREAST MASS: 0
HEARTBURN: 0
HEMATURIA: 0
SORE THROAT: 1
HEMATOCHEZIA: 0
PALPITATIONS: 0
SHORTNESS OF BREATH: 1
CHILLS: 0
ABDOMINAL PAIN: 0
CONSTIPATION: 0
WEAKNESS: 1
ARTHRALGIAS: 0
DIARRHEA: 0

## 2023-08-31 ASSESSMENT — ANXIETY QUESTIONNAIRES
GAD7 TOTAL SCORE: 3
GAD7 TOTAL SCORE: 3
8. IF YOU CHECKED OFF ANY PROBLEMS, HOW DIFFICULT HAVE THESE MADE IT FOR YOU TO DO YOUR WORK, TAKE CARE OF THINGS AT HOME, OR GET ALONG WITH OTHER PEOPLE?: SOMEWHAT DIFFICULT
1. FEELING NERVOUS, ANXIOUS, OR ON EDGE: SEVERAL DAYS
7. FEELING AFRAID AS IF SOMETHING AWFUL MIGHT HAPPEN: NOT AT ALL
IF YOU CHECKED OFF ANY PROBLEMS ON THIS QUESTIONNAIRE, HOW DIFFICULT HAVE THESE PROBLEMS MADE IT FOR YOU TO DO YOUR WORK, TAKE CARE OF THINGS AT HOME, OR GET ALONG WITH OTHER PEOPLE: SOMEWHAT DIFFICULT
4. TROUBLE RELAXING: SEVERAL DAYS
2. NOT BEING ABLE TO STOP OR CONTROL WORRYING: NOT AT ALL
6. BECOMING EASILY ANNOYED OR IRRITABLE: SEVERAL DAYS
7. FEELING AFRAID AS IF SOMETHING AWFUL MIGHT HAPPEN: NOT AT ALL
GAD7 TOTAL SCORE: 3
3. WORRYING TOO MUCH ABOUT DIFFERENT THINGS: NOT AT ALL
5. BEING SO RESTLESS THAT IT IS HARD TO SIT STILL: NOT AT ALL

## 2023-08-31 ASSESSMENT — PATIENT HEALTH QUESTIONNAIRE - PHQ9
SUM OF ALL RESPONSES TO PHQ QUESTIONS 1-9: 6
SUM OF ALL RESPONSES TO PHQ QUESTIONS 1-9: 6
10. IF YOU CHECKED OFF ANY PROBLEMS, HOW DIFFICULT HAVE THESE PROBLEMS MADE IT FOR YOU TO DO YOUR WORK, TAKE CARE OF THINGS AT HOME, OR GET ALONG WITH OTHER PEOPLE: NOT DIFFICULT AT ALL

## 2023-08-31 ASSESSMENT — ASTHMA QUESTIONNAIRES
QUESTION_5 LAST FOUR WEEKS HOW WOULD YOU RATE YOUR ASTHMA CONTROL: SOMEWHAT CONTROLLED
QUESTION_4 LAST FOUR WEEKS HOW OFTEN HAVE YOU USED YOUR RESCUE INHALER OR NEBULIZER MEDICATION (SUCH AS ALBUTEROL): TWO OR THREE TIMES PER WEEK
ACT_TOTALSCORE: 14
QUESTION_2 LAST FOUR WEEKS HOW OFTEN HAVE YOU HAD SHORTNESS OF BREATH: MORE THAN ONCE A DAY
QUESTION_3 LAST FOUR WEEKS HOW OFTEN DID YOUR ASTHMA SYMPTOMS (WHEEZING, COUGHING, SHORTNESS OF BREATH, CHEST TIGHTNESS OR PAIN) WAKE YOU UP AT NIGHT OR EARLIER THAN USUAL IN THE MORNING: ONCE OR TWICE
QUESTION_1 LAST FOUR WEEKS HOW MUCH OF THE TIME DID YOUR ASTHMA KEEP YOU FROM GETTING AS MUCH DONE AT WORK, SCHOOL OR AT HOME: SOME OF THE TIME
ACT_TOTALSCORE: 14

## 2023-08-31 ASSESSMENT — PAIN SCALES - GENERAL: PAINLEVEL: NO PAIN (0)

## 2023-08-31 NOTE — PROGRESS NOTES
SUBJECTIVE:   CC: Jonelle is an 37 year old who presents for preventive health visit.       8/31/2023    10:22 AM   Additional Questions   Roomed by TATO FRIAS   Accompanied by SELF       Healthy Habits:     Getting at least 3 servings of Calcium per day:  Yes    Bi-annual eye exam:  NO    Dental care twice a year:  Yes    Sleep apnea or symptoms of sleep apnea:  Daytime drowsiness and Sleep apnea    Diet:  Regular (no restrictions)    Frequency of exercise:  4-5 days/week    Duration of exercise:  30-45 minutes    Taking medications regularly:  Yes    Medication side effects:  Muscle aches and Lightheadedness    Additional concerns today:  Yes      Today's PHQ-9 Score:       8/31/2023    10:19 AM   PHQ-9 SCORE   PHQ-9 Total Score MyChart 6 (Mild depression)   PHQ-9 Total Score 6         Anxiety Follow-Up  How are you doing with your anxiety since your last visit? Improved   Have you had a significant life event? No   Are you feeling depressed? No  Do you have any concerns with your use of alcohol or other drugs? Yes:  cannabis     Social History     Tobacco Use    Smoking status: Every Day     Types: Vaping Device    Smokeless tobacco: Never    Tobacco comments:     occasional   Vaping Use    Vaping Use: Former   Substance Use Topics    Alcohol use: Not Currently     Alcohol/week: 1.0 standard drink of alcohol     Types: 1 Cans of beer per week    Drug use: Yes     Types: Marijuana     Comment: once per day through duration of pregnancy         9/16/2022    11:05 AM 10/14/2022     1:45 PM 8/31/2023    10:19 AM   LUIS FELIPE-7 SCORE   Total Score 5 (mild anxiety)  3 (minimal anxiety)   Total Score 5 18 3         9/16/2022    11:05 AM 10/14/2022     1:45 PM 8/31/2023    10:19 AM   PHQ   PHQ-9 Total Score 14 24 6   Q9: Thoughts of better off dead/self-harm past 2 weeks Not at all Not at all Not at all         8/31/2023    10:19 AM   Last PHQ-9   1.  Little interest or pleasure in doing things 0   2.  Feeling down, depressed,  or hopeless 0   3.  Trouble falling or staying asleep, or sleeping too much 1   4.  Feeling tired or having little energy 2   5.  Poor appetite or overeating 2   6.  Feeling bad about yourself 0   7.  Trouble concentrating 1   8.  Moving slowly or restless 0   Q9: Thoughts of better off dead/self-harm past 2 weeks 0   PHQ-9 Total Score 6         2023    10:19 AM   LUIS FELIPE-7    1. Feeling nervous, anxious, or on edge 1   2. Not being able to stop or control worrying 0   3. Worrying too much about different things 0   4. Trouble relaxing 1   5. Being so restless that it is hard to sit still 0   6. Becoming easily annoyed or irritable 1   7. Feeling afraid, as if something awful might happen 0   LUIS FELIPE-7 Total Score 3   If you checked any problems, how difficult have they made it for you to do your work, take care of things at home, or get along with other people? Somewhat difficult         Social History     Tobacco Use    Smoking status: Every Day     Types: Vaping Device    Smokeless tobacco: Never    Tobacco comments:     occasional   Substance Use Topics    Alcohol use: Not Currently     Alcohol/week: 1.0 standard drink of alcohol     Types: 1 Cans of beer per week             2023    10:18 AM   Alcohol Use   Prescreen: >3 drinks/day or >7 drinks/week? No     Reviewed orders with patient.  Reviewed health maintenance and updated orders accordingly - No  Lab work is in process    Breast Cancer Screenin/31/2023    10:21 AM   Breast CA Risk Assessment (FHS-7)   Do you have a family history of breast, colon, or ovarian cancer? No / Unknown         Patient under 40 years of age: Routine Mammogram Screening not recommended.   Pertinent mammograms are reviewed under the imaging tab.    History of abnormal Pap smear: NO - age 30-65 PAP every 5 years with negative HPV co-testing recommended      3/20/2015    12:00 AM 2011     5:39 PM 2010    12:00 AM   PAP / HPV   PAP (Historical) NIL  NIL  LSIL       Reviewed and updated as needed this visit by clinical staff   Tobacco  Allergies  Meds              Reviewed and updated as needed this visit by Provider                 Past Medical History:   Diagnosis Date    Anxiety     Asthma     Depressive disorder     Kidney infection     LSIL (low grade squamous intraepithelial lesion) on Pap smear 7/20/10    resolved    Mild major depression (H)     Sleep apnea     no cpap      Past Surgical History:   Procedure Laterality Date     SECTION  12/10/2013    Procedure:  SECTION;   Section for failure to progress. ;  Surgeon: Monique Can MD;  Location: [facility] L[facility]     SECTION N/A 2018    Procedure: REPEAT  SECTION;  Surgeon: Jose Tyler MD;  Location: [facility] OR    COMBINED  SECTION, SALPINGECTOMY BILATERAL N/A 2020    Procedure: Repeat  SECTION, WITH BILATERAL SALPINGECTOMY;  Surgeon: Octavia Maya MD;  Location: [facility] L[facility]    LAPAROSCOPIC CHOLECYSTECTOMY N/A 2015    Procedure: LAPAROSCOPIC CHOLECYSTECTOMY;  Surgeon: Yuliet Hutton MD;  Location: [facility] OR    ZZC NONSPECIFIC PROCEDURE  age 10     chalazion removal under general        Review of Systems   Constitutional:  Negative for chills and fever.   HENT:  Positive for sore throat. Negative for congestion, ear pain and hearing loss.    Eyes:  Negative for pain and visual disturbance.   Respiratory:  Positive for shortness of breath. Negative for cough.    Cardiovascular:  Negative for chest pain, palpitations and peripheral edema.   Gastrointestinal:  Negative for abdominal pain, constipation, diarrhea, heartburn, hematochezia and nausea.   Breasts:  Negative for tenderness, breast mass and discharge.   Genitourinary:  Positive for frequency. Negative for dysuria, genital sores, hematuria, pelvic pain, urgency, vaginal bleeding and vaginal discharge.   Musculoskeletal:  Negative for arthralgias, joint swelling and myalgias.  "  Skin:  Negative for rash.   Neurological:  Positive for weakness and headaches. Negative for dizziness and paresthesias.   Psychiatric/Behavioral:  Negative for mood changes. The patient is nervous/anxious.         OBJECTIVE:   /78   Pulse 70   Temp 98.8  F (37.1  C) (Tympanic)   Resp 12   Ht 1.6 m (5' 3\")   Wt 68.5 kg (151 lb)   LMP 08/25/2023   SpO2 99%   BMI 26.75 kg/m    Physical Exam  GENERAL: healthy, alert and no distress  EYES: Eyes grossly normal to inspection, PERRL and conjunctivae and sclerae normal  HENT: ear canals and TM's normal, nose and mouth without ulcers or lesions  NECK: no adenopathy, no asymmetry, masses, or scars and thyroid normal to palpation  RESP: lungs clear to auscultation - no rales, rhonchi or wheezes  BREAST: normal without masses, tenderness or nipple discharge and no palpable axillary masses or adenopathy  CV: regular rate and rhythm, normal S1 S2, no S3 or S4, no murmur, click or rub, no peripheral edema and peripheral pulses strong  ABDOMEN: soft, nontender, no hepatosplenomegaly, no masses and bowel sounds normal  MS: no gross musculoskeletal defects noted, no edema  SKIN: no suspicious lesions or rashes  NEURO: Normal strength and tone, mentation intact and speech normal  PSYCH: mentation appears normal, affect normal/bright    Diagnostic Test Results:  Labs reviewed in Epic    ASSESSMENT/PLAN:   Jonelle was seen today for physical.    Diagnoses and all orders for this visit:    Routine general medical examination at a health care facility    Routine screening for STI (sexually transmitted infection)  -     Treponema Abs w Reflex to RPR and Titer; Future  -     Herpes Simplex Virus 1 and 2 IgG; Future  -     HIV Antigen Antibody Combo; Future  -     Hepatitis B Surface Antibody; Future  -     Hepatitis C Screen Reflex to HCV RNA Quant and Genotype; Future  -     Chlamydia trachomatis/Neisseria gonorrhoeae by PCR - Clinic Collect  -     Treponema Abs w Reflex to " RPR and Titer  -     Herpes Simplex Virus 1 and 2 IgG  -     HIV Antigen Antibody Combo  -     Hepatitis B Surface Antibody  -     Hepatitis C Screen Reflex to HCV RNA Quant and Genotype    Screening cholesterol level  -     Lipid panel reflex to direct LDL Fasting; Future  -     Lipid panel reflex to direct LDL Fasting    Screening for deficiency anemia  -     CBC with platelets; Future  -     CBC with platelets    Screening for diabetes mellitus  -     Comprehensive metabolic panel (BMP + Alb, Alk Phos, ALT, AST, Total. Bili, TP); Future  -     Comprehensive metabolic panel (BMP + Alb, Alk Phos, ALT, AST, Total. Bili, TP)    Thyroid disorder screening  -     TSH with free T4 reflex; Future  -     TSH with free T4 reflex    Mild intermittent asthma without complication  Try singulair.   -     Adult Allergy/Asthma  Referral; Future  -     montelukast (SINGULAIR) 10 MG tablet; Take 1 tablet (10 mg) by mouth At Bedtime    Tobacco abuse  Ready to quit-given patches.   -     nicotine (NICODERM CQ) 7 MG/24HR 24 hr patch; Place 1 patch onto the skin every 24 hours    Panic attack  Fills about once per year. Refill provided. Uses sparingly.  -     LORazepam (ATIVAN) 0.5 MG tablet; Take 1 tablet (0.5 mg) by mouth every 6 hours as needed for anxiety    Nicotine dependence, uncomplicated, unspecified nicotine product type  -     SMOKING CESSATION COUNSELING 3-10 MIN        Patient has been advised of split billing requirements and indicates understanding: Yes      COUNSELING:  Reviewed preventive health counseling, as reflected in patient instructions        She reports that she has been smoking vaping device. She has never used smokeless tobacco.  Nicotine/Tobacco Cessation Plan:   Pharmacotherapies : Nicotine patch          Kristal Saravia, [facility]IOR LAKEAnswers submitted by the patient for this visit:  Patient Health Questionnaire (Submitted on 8/31/2023)  If you checked off any problems, how  difficult have these problems made it for you to do your work, take care of things at home, or get along with other people?: Not difficult at all  PHQ9 TOTAL SCORE: 6  LUIS FELIPE-7 (Submitted on 8/31/2023)  LUIS FELIPE 7 TOTAL SCORE: 3

## 2023-08-31 NOTE — PROGRESS NOTES
Alarm to lab Pt became faint while having blood drawn. Pt laying on floor stating she is ok. BP while laying 129/90. Sat up and had some juice and crackers. BP sitting 131/89. Pt felt fine and was escorted to the restroom for a UA. Pt steady on feet. Advised to have blood drawn while laying down in the future.    [place] RN [facility]

## 2023-09-01 LAB
C TRACH DNA SPEC QL PROBE+SIG AMP: NEGATIVE
HBV SURFACE AB SERPL IA-ACNC: 51.63 M[IU]/ML
HBV SURFACE AB SERPL IA-ACNC: REACTIVE M[IU]/ML
HCV AB SERPL QL IA: NONREACTIVE
HIV 1+2 AB+HIV1 P24 AG SERPL QL IA: NONREACTIVE
HSV1 IGG SERPL QL IA: 0.24 INDEX
HSV1 IGG SERPL QL IA: NORMAL
HSV2 IGG SERPL QL IA: 0.1 INDEX
HSV2 IGG SERPL QL IA: NORMAL
N GONORRHOEA DNA SPEC QL NAA+PROBE: NEGATIVE
T PALLIDUM AB SER QL: NONREACTIVE

## 2023-09-26 ENCOUNTER — OFFICE VISIT (OUTPATIENT)
Dept: ALLERGY | Facility: CLINIC | Age: 38
End: 2023-09-26
Payer: COMMERCIAL

## 2023-09-26 VITALS
OXYGEN SATURATION: 98 % | WEIGHT: 151 LBS | HEART RATE: 103 BPM | BODY MASS INDEX: 26.75 KG/M2 | SYSTOLIC BLOOD PRESSURE: 137 MMHG | HEIGHT: 63 IN | DIASTOLIC BLOOD PRESSURE: 93 MMHG

## 2023-09-26 DIAGNOSIS — J31.0 NONALLERGIC RHINITIS: Primary | ICD-10-CM

## 2023-09-26 DIAGNOSIS — J45.40 UNCONTROLLED MODERATE PERSISTENT ASTHMA: ICD-10-CM

## 2023-09-26 LAB
FEF 25/75: NORMAL
FEV-1: NORMAL
FEV1/FVC: NORMAL
FVC: NORMAL

## 2023-09-26 PROCEDURE — 95004 PERQ TESTS W/ALRGNC XTRCS: CPT | Performed by: ALLERGY & IMMUNOLOGY

## 2023-09-26 PROCEDURE — 94010 BREATHING CAPACITY TEST: CPT | Performed by: ALLERGY & IMMUNOLOGY

## 2023-09-26 PROCEDURE — 99204 OFFICE O/P NEW MOD 45 MIN: CPT | Mod: 25 | Performed by: ALLERGY & IMMUNOLOGY

## 2023-09-26 RX ORDER — BUDESONIDE AND FORMOTEROL FUMARATE DIHYDRATE 80; 4.5 UG/1; UG/1
2 AEROSOL RESPIRATORY (INHALATION) 2 TIMES DAILY
Qty: 10.2 G | Refills: 1 | Status: SHIPPED | OUTPATIENT
Start: 2023-09-26 | End: 2024-09-16

## 2023-09-26 NOTE — PROGRESS NOTES
Jonelle Khan was seen in the Allergy Clinic at [facility].    Jonelle Khan is a 38 year old White female being seen today at the request of Kristal Saravia CNP in consultation for allergies and asthma.    Asthma - triggered by allergies, URIs, cold air, humidity, exercise, and anxiety. Diagnosed when she was in high school. No prior ED visits or hospitalizations. No prior oral steroid use. Not on daily medications. Currently using albuterol daily for the past month. Prior to this she was using albuterol a couple of times per week. Prescribed montelukast last month but hasn't started this medication yet.     Allergies - year round symptoms that worsen with seasonal changes. No prior allergy testing. Symptoms include cough, rhinorrhea, and itchy eyes. Doesn't typically take medications for these symptoms.      Past Medical History:   Diagnosis Date    Anxiety     Asthma     Depressive disorder     Kidney infection     LSIL (low grade squamous intraepithelial lesion) on Pap smear 7/20/10    resolved    Mild major depression (H)     Sleep apnea     no cpap     Family History   Problem Relation Age of Onset    Family History Negative Mother     Gallbladder Disease Mother     Cancer Maternal Grandfather         skin CA     Gallbladder Disease Maternal Grandfather     Asthma Father     Gastrointestinal Disease Father         s/p maninder. Also Paunt and Mgf s/p maninder.     Asthma Brother     Asthma Sister     Depression Sister     Anxiety Disorder Sister     Heart Disease Paternal Grandfather         MI in his 60's     Breast Cancer Maternal Grandmother          M great grandmother  in her 60's     Depression Maternal Grandmother     Coronary Artery Disease Maternal Grandmother     Diabetes Paternal Aunt         Dx in her mid to late 30's (sounds like DM2)    Depression Paternal Aunt     Anxiety Disorder Paternal Aunt     Substance Abuse Paternal Aunt     ACOSTA Paternal Grandmother          Stent placed age 63     Depression Paternal Grandmother     Depression Paternal Uncle     Substance Abuse Paternal Uncle     Anxiety Disorder Cousin      Past Surgical History:   Procedure Laterality Date     SECTION  12/10/2013    Procedure:  SECTION;   Section for failure to progress. ;  Surgeon: Monique Can MD;  Location: [facility] L[facility]     SECTION N/A 2018    Procedure: REPEAT  SECTION;  Surgeon: Jose Tyler MD;  Location: [facility] OR    COMBINED  SECTION, SALPINGECTOMY BILATERAL N/A 2020    Procedure: Repeat  SECTION, WITH BILATERAL SALPINGECTOMY;  Surgeon: Octavia Maya MD;  Location: [facility] L[facility]    LAPAROSCOPIC CHOLECYSTECTOMY N/A 2015    Procedure: LAPAROSCOPIC CHOLECYSTECTOMY;  Surgeon: Yuliet Hutton MD;  Location: [facility] OR    ZZC NONSPECIFIC PROCEDURE  age 10     chalazion removal under general        ENVIRONMENTAL HISTORY:   Jonelle lives in a [facility] home [place] The home is heated with a forced air. They do have central air conditioning. The patient's bedroom is furnished with hard kwabena in bedroom.  Pets inside the house include 2 cat(s) and 1 dog(s). There is no history of cockroach or mice infestation. Do you smoke cigarettes or other recreational drugs? No Do you vape or use an e-cigarette? No. There is/are 0 smokers living in the house. There is/are 0 who smoke ecigarettes/vape living in the house. The house does not have a damp basement.     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        Current Outpatient Medications:     albuterol (PROAIR HFA/PROVENTIL HFA/VENTOLIN HFA) 108 (90 Base) MCG/ACT inhaler, Inhale 2 puffs into the lungs every 6 hours, Disp: 18 g, Rfl: 1    budesonide-formoterol (SYMBICORT) 80-4.5 MCG/ACT Inhaler, Inhale 2 puffs into the lungs 2 times daily, Disp: 10.2 g, Rfl: 1    FLUoxetine (PROZAC) 40 MG capsule, Take 1 capsule (40 mg) by  mouth daily, Disp: 90 capsule, Rfl: 3    LORazepam (ATIVAN) 0.5 MG tablet, Take 1 tablet (0.5 mg) by mouth every 6 hours as needed for anxiety, Disp: 30 tablet, Rfl: 0    nicotine (NICODERM CQ) 7 MG/24HR 24 hr patch, Place 1 patch onto the skin every 24 hours, Disp: 28 patch, Rfl: 6    Prenatal Vit-Fe Fumarate-FA (PRENATAL COMPLETE) 14-0.4 MG TABS, Take 1 tablet by mouth daily, Disp: , Rfl:     acetaminophen (TYLENOL) 500 MG tablet, Take 500-1,000 mg by mouth every 6 hours as needed for mild pain, Disp: , Rfl:     benzonatate (TESSALON) 100 MG capsule, Take 1 capsule (100 mg) by mouth 3 times daily as needed for cough (Patient not taking: Reported on 9/26/2023), Disp: 12 capsule, Rfl: 0    prazosin (MINIPRESS) 1 MG capsule, Take 1 capsule (1 mg) by mouth At Bedtime (Patient not taking: Reported on 7/25/2023), Disp: 90 capsule, Rfl: 3    propranolol (INDERAL) 10 MG tablet, Take 1 tablet (10 mg) by mouth 3 times daily (Patient not taking: Reported on 4/24/2023), Disp: 90 tablet, Rfl: 3    traZODone (DESYREL) 50 MG tablet, TAKE 1 TABLET (50 MG) BY MOUTH AT BEDTIME (Patient not taking: Reported on 9/26/2023), Disp: 90 tablet, Rfl: 0  Immunization History   Administered Date(s) Administered    COVID-19 MONOVALENT 12+ (Pfizer) 02/11/2022, 03/15/2022    COVID-19 Monovalent 12+ (Pfizer 2022) 02/11/2022, 03/16/2022    DT (PEDS <7y) 05/25/1999    DTaP, Unspecified 06/12/2020    HIB (PRP-T) 09/16/1988    Hepatitis B, Peds 07/17/1997, 08/18/1997, 01/19/1998    Historical DTP/aP 1986, 01/05/1987, 03/16/1987, 09/16/1988    Influenza (IIV3) PF 09/24/2009, 09/16/2010    Influenza (intradermal) 09/27/2013    Influenza Vaccine >6 months (Alfuria,Fluzone) 11/01/2016, 10/04/2017, 10/26/2018    MMR 12/23/1987, 05/25/1999    OPV, trivalent, live 1986, 01/05/1987, 09/16/1988    TDAP Vaccine (Adacel) 06/09/2009, 09/27/2013    TDAP Vaccine (Boostrix) 10/26/2018, 06/12/2020     Allergies   Allergen Reactions    Ceclor [Cefaclor]  "Rash         EXAM:   BP (!) 137/93 (BP Location: Right arm, Patient Position: Sitting, Cuff Size: Adult Regular)   Pulse 103   Ht 1.6 m (5' 3\")   Wt 68.5 kg (151 lb)   LMP 08/25/2023   SpO2 98%   BMI 26.75 kg/m    Physical Exam  Vitals and nursing note reviewed.   Constitutional:       Appearance: Normal appearance.   HENT:      Head: Normocephalic and atraumatic.      Right Ear: Tympanic membrane, ear canal and external ear normal.      Left Ear: Tympanic membrane, ear canal and external ear normal.      Nose: No mucosal edema or rhinorrhea.      Mouth/Throat:      Mouth: Mucous membranes are moist. No oral lesions.      Pharynx: Oropharynx is clear. Uvula midline. No posterior oropharyngeal erythema.   Eyes:      General: Lids are normal. No scleral icterus.     Extraocular Movements: Extraocular movements intact.      Conjunctiva/sclera: Conjunctivae normal.   Neck:      Comments: No asymmetry, masses, or scars  Cardiovascular:      Rate and Rhythm: Normal rate and regular rhythm.      Heart sounds: S1 normal and S2 normal. No murmur heard.  Pulmonary:      Effort: Pulmonary effort is normal. No respiratory distress.      Breath sounds: Normal breath sounds and air entry.   Musculoskeletal:      Comments: No musculoskeletal defects noted   Skin:     General: Skin is warm and dry.      Findings: No lesion or rash.   Neurological:      General: No focal deficit present.      Mental Status: She is alert.   Psychiatric:         Mood and Affect: Mood and affect normal.           WORKUP: Skin testing, Spirometry    SPIROMETRY       FVC 4.58L (127% of predicted).     FEV1 3.34L (112% of predicted).     FEV1/FVC 73%      I have reviewed and interpreted these results. Testing meets criteria for acceptability and reproducibility. Values are consistent with mild airflow obstruction.      ENVIRONMENTAL PERCUTANEOUS SKIN TESTING: ADULT      9/26/2023     4:00 PM   [facility]   Consent Y   Ordering Physician " Dr. Grace   Interpreting Physician Dr. Grace   Testing Technician Vanessa SARAVIA RN   Location Back   Time start: 16:12   Time End: 16:27   Positive Control: Histatrol*ALK 1 mg/ml 7/26   Negative Control: 50% Glycerin 0   Cat Hair*ALK (10,000 BAU/ml) 0   AP Dog Hair/Dander (1:100 w/v) 0   Dust Mite p. 30,000 AU/ml 0   Dust Mite f. (30,000 AU/ml) 0   Abe (W/F in millimeters) 0   Cory Grass (100,000 BAU/mL) 0   Red Cedar (W/F in millimeters) 0   Maple/[place] (W/F in millimeters) 0   Hackberry (W/F in millimeters) 0   [place] (W/F in millimeters) 0   [place] *ALK (W/F in millimeters) 0   American Elm (W/F in millimeters) 0   [place] (W/F in millimeters) 0   Black [place] (W/F in millimeters) 0   Birch Mix (W/F in millimeters) 0   [place] (W/F in millimeters) 0   Oak (W/F in millimeters) 0   Cockle[place] (W/F in millimeters) 0   [place] (W/F in millimeters) 0   White [place] (W/F in millimeters) 0   Careless (W/F in millimeters) 0   Nettle (W/F in millimeters) 0   English Plantain (W/F in millimeters) 0   Kochia (W/F in millimeters) 0   Lamb's Quarter (W/F in millimeters) 0   Marshelder (W/F in millimeters) 0   Ragweed Mix* ALK (W/F in millimeters) 0   Russian Thistle (W/F in millimeters) 0   Sagebrush/Mugwort (W/F in millimeters) 0   Sheep Sorrel (W/F in millimeters) 0   Feather Mix* ALK (W/F in millimeters) 0   Penicillium Mix (1:10 w/v) 0   Curvularia spicifera (1:10 w/v) 0   Epicoccum (1:10 w/v) 0   Aspergillus fumigatus (1:10 w/v): 0   Alternaria tenius (1:10 w/v) 0   H. Cladosporium (1:10 w/v) 0   Phoma herbarum (1:10 w/v) 0      Appropriate response to controls, all others negative    ASSESSMENT/PLAN:  Jonelle Khan is a 38 year old female here for evaluation of asthma and allergies.    1. Uncontrolled moderate persistent asthma - She reports using albuterol daily for the past month due to ongoing shortness of breath. No prior history of ED visits or hospitalizations for asthma. Spirometry today is  indicative of mild airflow obstruction. Discussed starting maintenance medication. Montelukast was prescribed last month but she has not started taking it. Reviewed the potential side effects and she did not wish to start this therapy. In place of LTRA will start ICS/LABA therapy with Symbicort.    - Spirometry, Breathing Capacity: Normal Order, Clinic Performed  - budesonide-formoterol (SYMBICORT) 80-4.5 MCG/ACT Inhaler; Inhale 2 puffs into the lungs 2 times daily  Dispense: 10.2 g; Refill: 1  - Adult Allergy Clinic Follow-Up Order; Future  - ALLERGY SKIN TESTS,ALLERGENS    2. Nonallergic rhinitis - Skin testing today is negative for evidence of aeroallergen sensitization.    - ALLERGY SKIN TESTS,ALLERGENS      Follow-up in 3 months, sooner if needed      Thank you for allowing me to participate in the care of Jonelle Khan.      David Grace MD, FAAAAI  Allergy/Immunology  [facility]      Chart documentation done in part with Dragon Voice Recognition Software. Although reviewed after completion, some word and grammatical errors may remain.

## 2023-09-26 NOTE — PROGRESS NOTES
Per provider verbal order, placed Adult Environmental Panel scratch test.  Consent was obtained prior to procedure.  Once panels were placed, patient was monitored for 15 minutes in clinic.  Provider read test after 15 minutes..  Pt tolerated procedure well.  All questions and concerns were addressed at office visit.     Vanessa Rivera, ZOILAN, RN

## 2023-09-26 NOTE — LETTER
2023         RE: Jonelle Khan  [id number]  [place] [address]        Dear Colleague,    Thank you for referring your patient, Jonelle Khan, to the [facility]. Please see a copy of my visit note below.    Jonelle Khan was seen in the Allergy Clinic at [facility].    Jonelle Khan is a 38 year old White female being seen today at the request of Kristal Saravia CNP in consultation for allergies and asthma.    Asthma - triggered by allergies, URIs, cold air, humidity, exercise, and anxiety. Diagnosed when she was in high school. No prior ED visits or hospitalizations. No prior oral steroid use. Not on daily medications. Currently using albuterol daily for the past month. Prior to this she was using albuterol a couple of times per week. Prescribed montelukast last month but hasn't started this medication yet.     Allergies - year round symptoms that worsen with seasonal changes. No prior allergy testing. Symptoms include cough, rhinorrhea, and itchy eyes. Doesn't typically take medications for these symptoms.      Past Medical History:   Diagnosis Date     Anxiety      Asthma      Depressive disorder      Kidney infection      LSIL (low grade squamous intraepithelial lesion) on Pap smear 7/20/10    resolved     Mild major depression (H)      Sleep apnea     no cpap     Family History   Problem Relation Age of Onset     Family History Negative Mother      Gallbladder Disease Mother      Cancer Maternal Grandfather         skin CA      Gallbladder Disease Maternal Grandfather      Asthma Father      Gastrointestinal Disease Father         s/p maninder. Also Paunt and Mgf s/p maninder.      Asthma Brother      Asthma Sister      Depression Sister      Anxiety Disorder Sister      Heart Disease Paternal Grandfather         MI in his 60's      Breast Cancer Maternal Grandmother          M great grandmother  in her 60's      Depression Maternal  Grandmother      Coronary Artery Disease Maternal Grandmother      Diabetes Paternal Aunt         Dx in her mid to late 30's (sounds like DM2)     Depression Paternal Aunt      Anxiety Disorder Paternal Aunt      Substance Abuse Paternal Aunt      MONSE.A.D. Paternal Grandmother         Stent placed age 63      Depression Paternal Grandmother      Depression Paternal Uncle      Substance Abuse Paternal Uncle      Anxiety Disorder Cousin      Past Surgical History:   Procedure Laterality Date      SECTION  12/10/2013    Procedure:  SECTION;   Section for failure to progress. ;  Surgeon: Monique Can MD;  Location: [facility] L[facility]      SECTION N/A 2018    Procedure: REPEAT  SECTION;  Surgeon: Jose Tyler MD;  Location: [facility] OR     COMBINED  SECTION, SALPINGECTOMY BILATERAL N/A 2020    Procedure: Repeat  SECTION, WITH BILATERAL SALPINGECTOMY;  Surgeon: Octavia Maya MD;  Location: [facility] L[facility]     LAPAROSCOPIC CHOLECYSTECTOMY N/A 2015    Procedure: LAPAROSCOPIC CHOLECYSTECTOMY;  Surgeon: Yuliet Hutton MD;  Location: [facility] OR     ZZC NONSPECIFIC PROCEDURE  age 10     chalazion removal under general        ENVIRONMENTAL HISTORY:   Jonelle [place] The home is heated with a forced air. They do have central air conditioning. The patient's bedroom is furnished with hard kwabena in bedroom.  Pets inside the house include 2 cat(s) and 1 dog(s). There is no history of cockroach or mice infestation. Do you smoke cigarettes or other recreational drugs? No Do you vape or use an e-cigarette? No. There is/are 0 smokers living in the house. There is/are 0 who smoke ecigarettes/vape living in the house. The house does not have a damp basement.     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        Current Outpatient Medications:      albuterol (PROAIR HFA/PROVENTIL HFA/VENTOLIN  HFA) 108 (90 Base) MCG/ACT inhaler, Inhale 2 puffs into the lungs every 6 hours, Disp: 18 g, Rfl: 1     budesonide-formoterol (SYMBICORT) 80-4.5 MCG/ACT Inhaler, Inhale 2 puffs into the lungs 2 times daily, Disp: 10.2 g, Rfl: 1     FLUoxetine (PROZAC) 40 MG capsule, Take 1 capsule (40 mg) by mouth daily, Disp: 90 capsule, Rfl: 3     LORazepam (ATIVAN) 0.5 MG tablet, Take 1 tablet (0.5 mg) by mouth every 6 hours as needed for anxiety, Disp: 30 tablet, Rfl: 0     nicotine (NICODERM CQ) 7 MG/24HR 24 hr patch, Place 1 patch onto the skin every 24 hours, Disp: 28 patch, Rfl: 6     Prenatal Vit-Fe Fumarate-FA (PRENATAL COMPLETE) 14-0.4 MG TABS, Take 1 tablet by mouth daily, Disp: , Rfl:      acetaminophen (TYLENOL) 500 MG tablet, Take 500-1,000 mg by mouth every 6 hours as needed for mild pain, Disp: , Rfl:      benzonatate (TESSALON) 100 MG capsule, Take 1 capsule (100 mg) by mouth 3 times daily as needed for cough (Patient not taking: Reported on 9/26/2023), Disp: 12 capsule, Rfl: 0     prazosin (MINIPRESS) 1 MG capsule, Take 1 capsule (1 mg) by mouth At Bedtime (Patient not taking: Reported on 7/25/2023), Disp: 90 capsule, Rfl: 3     propranolol (INDERAL) 10 MG tablet, Take 1 tablet (10 mg) by mouth 3 times daily (Patient not taking: Reported on 4/24/2023), Disp: 90 tablet, Rfl: 3     traZODone (DESYREL) 50 MG tablet, TAKE 1 TABLET (50 MG) BY MOUTH AT BEDTIME (Patient not taking: Reported on 9/26/2023), Disp: 90 tablet, Rfl: 0  Immunization History   Administered Date(s) Administered     COVID-19 MONOVALENT 12+ (Pfizer) 02/11/2022, 03/15/2022     COVID-19 Monovalent 12+ (Pfizer 2022) 02/11/2022, 03/16/2022     DT (PEDS <7y) 05/25/1999     DTaP, Unspecified 06/12/2020     HIB (PRP-T) 09/16/1988     Hepatitis B, Peds 07/17/1997, 08/18/1997, 01/19/1998     Historical DTP/aP 1986, 01/05/1987, 03/16/1987, 09/16/1988     Influenza (IIV3) PF 09/24/2009, 09/16/2010     Influenza (intradermal) 09/27/2013     Influenza  "Vaccine >6 months (Alfuria,Fluzone) 11/01/2016, 10/04/2017, 10/26/2018     MMR 12/23/1987, 05/25/1999     OPV, trivalent, live 1986, 01/05/1987, 09/16/1988     TDAP Vaccine (Adacel) 06/09/2009, 09/27/2013     TDAP Vaccine (Boostrix) 10/26/2018, 06/12/2020     Allergies   Allergen Reactions     Ceclor [Cefaclor] Rash         EXAM:   BP (!) 137/93 (BP Location: Right arm, Patient Position: Sitting, Cuff Size: Adult Regular)   Pulse 103   Ht 1.6 m (5' 3\")   Wt 68.5 kg (151 lb)   LMP 08/25/2023   SpO2 98%   BMI 26.75 kg/m    Physical Exam  Vitals and nursing note reviewed.   Constitutional:       Appearance: Normal appearance.   HENT:      Head: Normocephalic and atraumatic.      Right Ear: Tympanic membrane, ear canal and external ear normal.      Left Ear: Tympanic membrane, ear canal and external ear normal.      Nose: No mucosal edema or rhinorrhea.      Mouth/Throat:      Mouth: Mucous membranes are moist. No oral lesions.      Pharynx: Oropharynx is clear. Uvula midline. No posterior oropharyngeal erythema.   Eyes:      General: Lids are normal. No scleral icterus.     Extraocular Movements: Extraocular movements intact.      Conjunctiva/sclera: Conjunctivae normal.   Neck:      Comments: No asymmetry, masses, or scars  Cardiovascular:      Rate and Rhythm: Normal rate and regular rhythm.      Heart sounds: S1 normal and S2 normal. No murmur heard.  Pulmonary:      Effort: Pulmonary effort is normal. No respiratory distress.      Breath sounds: Normal breath sounds and air entry.   Musculoskeletal:      Comments: No musculoskeletal defects noted   Skin:     General: Skin is warm and dry.      Findings: No lesion or rash.   Neurological:      General: No focal deficit present.      Mental Status: She is alert.   Psychiatric:         Mood and Affect: Mood and affect normal.           WORKUP: Skin testing, Spirometry    SPIROMETRY       FVC 4.58L (127% of predicted).     FEV1 3.34L (112% of predicted).    "  FEV1/FVC 73%      I have reviewed and interpreted these results. Testing meets criteria for acceptability and reproducibility. Values are consistent with mild airflow obstruction.      ENVIRONMENTAL PERCUTANEOUS SKIN TESTING: ADULT      9/26/2023     4:00 PM   [facility]   Consent Y   Ordering Physician Dr. Grace   Interpreting Physician Dr. Grace   Testing Technician Vanessa SARAVIA RN   Location Back   Time start: 16:12   Time End: 16:27   Positive Control: Histatrol*ALK 1 mg/ml 7/26   Negative Control: 50% Glycerin 0   Cat Hair*ALK (10,000 BAU/ml) 0   AP Dog Hair/Dander (1:100 w/v) 0   Dust Mite p. 30,000 AU/ml 0   Dust Mite f. (30,000 AU/ml) 0   Abe (W/F in millimeters) 0   Cory Grass (100,000 BAU/mL) 0   Red Cedar (W/F in millimeters) 0   Maple/[place] (W/F in millimeters) 0   [place]berry (W/F in millimeters) 0   [place] (W/F in millimeters) 0   [place] *ALK (W/F in millimeters) 0   American Elm (W/F in millimeters) 0   [place] (W/F in millimeters) 0   Black [place] (W/F in millimeters) 0   Birch Mix (W/F in millimeters) 0   [place] (W/F in millimeters) 0   Oak (W/F in millimeters) 0   [place] (W/F in millimeters) 0   [place] (W/F in millimeters) 0   [place] (W/F in millimeters) 0   Careless (W/F in millimeters) 0   Nettle (W/F in millimeters) 0   English Plantain (W/F in millimeters) 0   Kochia (W/F in millimeters) 0   Lamb's Quarter (W/F in millimeters) 0   Marshelder (W/F in millimeters) 0   Ragweed Mix* ALK (W/F in millimeters) 0   Russian Thistle (W/F in millimeters) 0   Sagebrush/Mugwort (W/F in millimeters) 0   Sheep Sorrel (W/F in millimeters) 0   Feather Mix* ALK (W/F in millimeters) 0   Penicillium Mix (1:10 w/v) 0   Curvularia spicifera (1:10 w/v) 0   Epicoccum (1:10 w/v) 0   Aspergillus fumigatus (1:10 w/v): 0   Alternaria tenius (1:10 w/v) 0   H. Cladosporium (1:10 w/v) 0   Phoma herbarum (1:10 w/v) 0      Appropriate response to controls, all others  negative    ASSESSMENT/PLAN:  Jonelle Khan is a 38 year old female here for evaluation of asthma and allergies.    1. Uncontrolled moderate persistent asthma - She reports using albuterol daily for the past month due to ongoing shortness of breath. No prior history of ED visits or hospitalizations for asthma. Spirometry today is indicative of mild airflow obstruction. Discussed starting maintenance medication. Montelukast was prescribed last month but she has not started taking it. Reviewed the potential side effects and she did not wish to start this therapy. In place of LTRA will start ICS/LABA therapy with Symbicort.    - Spirometry, Breathing Capacity: Normal Order, Clinic Performed  - budesonide-formoterol (SYMBICORT) 80-4.5 MCG/ACT Inhaler; Inhale 2 puffs into the lungs 2 times daily  Dispense: 10.2 g; Refill: 1  - Adult Allergy Clinic Follow-Up Order; Future  - ALLERGY SKIN TESTS,ALLERGENS    2. Nonallergic rhinitis - Skin testing today is negative for evidence of aeroallergen sensitization.    - ALLERGY SKIN TESTS,ALLERGENS      Follow-up in 3 months, sooner if needed      Thank you for allowing me to participate in the care of Jonelle Khan.      David Grace MD, FAAAAI  Allergy/Immunology  [facility]      Chart documentation done in part with Dragon Voice Recognition Software. Although reviewed after completion, some word and grammatical errors may remain.    Per provider verbal order, placed Adult Environmental Panel scratch test.  Consent was obtained prior to procedure.  Once panels were placed, patient was monitored for 15 minutes in clinic.  Provider read test after 15 minutes..  Pt tolerated procedure well.  All questions and concerns were addressed at office visit.     KEYA Kendall, RN      Again, thank you for allowing me to participate in the care of your patient.        Sincerely,        David Grace,  MD

## 2024-01-22 DIAGNOSIS — F41.9 ANXIETY: ICD-10-CM

## 2024-01-23 RX ORDER — FLUOXETINE 40 MG/1
40 CAPSULE ORAL DAILY
Qty: 90 CAPSULE | Refills: 0 | Status: SHIPPED | OUTPATIENT
Start: 2024-01-23 | End: 2024-04-24

## 2024-03-11 NOTE — PROGRESS NOTES
SUBJECTIVE:   Jonelle Khan is a 32 year old female who presents to clinic today for the following health issues:    Migraine Follow-Up    Headaches symptoms:  Stable     Frequency: monthly     Duration of headaches: 1-2 days    Able to do normal daily activities/work with migraines: No -     Rescue/Relief medication: none              Effectiveness: NA relief    Preventative medication: None    Neurologic complications: No new stroke-like symptoms, loss of vision or speech, numbness or weakness    In the past 4 weeks, how often have you gone to Urgent Care or the emergency room because of your headaches?  0      Amount of exercise or physical activity: 2-3 days/week for an average of 15-30 minutes    Problems taking medications regularly: No    Medication side effects: none    Diet: regular (no restrictions)    Migraines: Jonelle reports that her migraines seem to be lining up with her period. She has been tracking triggers and has found them to be hormones, weather, light, sound and stress. Her symptoms include vision disturbance, nausea and headaches.    Work: Jonelle has missed several days of work due to migraines, request FMLA forms be completed.     Anxiety, Bipolar disorder: Jonelle reports that she had a difficult time this winter with increased depression, but recently she has started feeling better. She also thinks that she is getting to a better place with her bipolar disorder.    OB/GYN: She reports that her period is two weeks late and she has been having breast tenderness. She has been trying to get pregnant.    Respiratory: Jonelle also complains that she has been having cough, SOB, heart palpitations and chest pain recently. She states that the pain is not worse when she breaths deeply, and she has been coughing up brown sputum. She notes that the pain feels like it is under her sternum. She has been using her inhaler once/day with moderate relief. She does not cough at night, and she denies  Multiple views of the left coronary artery. "dizziness, sore throat, and wheezing.    Smoking: Jonelle smokes \"a few\" cigarettes/day. She is trying to quit      Problem list and histories reviewed & adjusted, as indicated.  Additional history: as documented    Reviewed and updated as needed this visit by clinical staff  Tobacco  Allergies  Meds  Med Hx  Surg Hx  Fam Hx  Soc Hx      Reviewed and updated as needed this visit by Provider       ROS:  Constitutional, HEENT, cardiovascular, pulmonary, neuro and psych systems are negative, except as otherwise noted.    This document serves as a record of the services and decisions personally performed and made by Susan Haase, CNP. It was created on her behalf by Anthony Cornell, a trained medical scribe. The creation of this document is based the provider's statements to the medical scribe.  Anthony Cornell April 12, 2018 1:07 PM      OBJECTIVE:   /74 (BP Location: Left arm, Patient Position: Chair, Cuff Size: Adult Regular)  Pulse 76  Temp 98.6  F (37  C) (Oral)  Resp 14  Ht 1.588 m (5' 2.5\")  Wt 71.2 kg (157 lb)  SpO2 98%  BMI 28.26 kg/m2  Body mass index is 28.26 kg/(m^2).  GENERAL: healthy, alert and no distress  HENT: ear canals and TM's normal, nose and mouth without ulcers or lesions  NECK: no adenopathy, no asymmetry, masses, or scars and thyroid normal to palpation  RESP: lungs clear to auscultation - no rales, rhonchi or wheezes  CV: regular rate and rhythm, normal S1 S2, no S3 or S4, no murmur, click or rub, no peripheral edema   GI:  Epigastric tenderness  PSYCH: mentation appears normal, affect normal/bright    Diagnostic Test Results:  No results found for this or any previous visit (from the past 24 hour(s)).    ASSESSMENT/PLAN:   Jonelle was seen today for forms, headache, uri and health maintenance.    Diagnoses and all orders for this visit:    Migraine without aura and without status migrainosus, not intractable: occurs 1-2 times per month, relieved with dark room, caffeine " drink.    Anxiety and Bipolar disorder:  PHQ 9 score of 8, denies thoughts of harming self or others.  -     hydrOXYzine (ATARAX) 25 MG tablet; Take 1-2 tablets (25-50 mg) by mouth every 6 hours as needed for anxiety  -     sertraline (ZOLOFT) 100 MG tablet; Take 1 tablet (100 mg) by mouth daily    Irregular periods:  HCG negative, patient informed.   -     Beta HCG qual IFA urine - [facility] and [facility]    Gastroesophageal reflux disease with esophagitis:  Will start on ranitidine to take bid x 2 weeks.    -     ranitidine (ZANTAC) 150 MG tablet; Take 1 tablet (150 mg) by mouth 2 times daily    Follow up in 6 months, sooner as needed.   The information in this document, created by the medical scribe for me, accurately reflects the services I personally performed and the decisions made by me. I have reviewed and approved this document for accuracy.   Susan Haase, CNP Susan Haase, JUAN [facility]  Answers for HPI/ROS submitted by the patient on 4/12/2018   If you checked off any problems, how difficult have these problems made it for you to do your work, take care of things at home, or get along with other people?: Somewhat difficult  PHQ9 TOTAL SCORE: 8

## 2024-04-03 ENCOUNTER — HOSPITAL ENCOUNTER (EMERGENCY)
Facility: CLINIC | Age: 39
Discharge: HOME | End: 2024-04-03
Attending: EMERGENCY MEDICINE | Admitting: EMERGENCY MEDICINE
Payer: COMMERCIAL

## 2024-04-03 ENCOUNTER — APPOINTMENT (OUTPATIENT)
Dept: GENERAL RADIOLOGY | Facility: CLINIC | Age: 39
End: 2024-04-03
Attending: EMERGENCY MEDICINE
Payer: COMMERCIAL

## 2024-04-03 ENCOUNTER — NURSE TRIAGE (OUTPATIENT)
Dept: FAMILY MEDICINE | Facility: CLINIC | Age: 39
End: 2024-04-03
Payer: COMMERCIAL

## 2024-04-03 VITALS
SYSTOLIC BLOOD PRESSURE: 124 MMHG | HEART RATE: 79 BPM | WEIGHT: 145 LBS | TEMPERATURE: 98.4 F | DIASTOLIC BLOOD PRESSURE: 87 MMHG | OXYGEN SATURATION: 100 % | RESPIRATION RATE: 20 BRPM | HEIGHT: 62 IN | BODY MASS INDEX: 26.68 KG/M2

## 2024-04-03 DIAGNOSIS — F41.8 DEPRESSION WITH ANXIETY: ICD-10-CM

## 2024-04-03 DIAGNOSIS — F12.10 CANNABIS ABUSE, CONTINUOUS: ICD-10-CM

## 2024-04-03 PROBLEM — F12.20 CANNABIS DEPENDENCE, DAILY USE (H): Status: ACTIVE | Noted: 2024-04-03

## 2024-04-03 LAB
AMPHETAMINES UR QL SCN: ABNORMAL
ANION GAP SERPL CALCULATED.3IONS-SCNC: 15 MMOL/L (ref 7–15)
ATRIAL RATE - MUSE: 100 BPM
BARBITURATES UR QL SCN: ABNORMAL
BASOPHILS # BLD AUTO: 0.1 10E3/UL (ref 0–0.2)
BASOPHILS NFR BLD AUTO: 2 %
BENZODIAZ UR QL SCN: ABNORMAL
BUN SERPL-MCNC: 5.7 MG/DL (ref 6–20)
BZE UR QL SCN: ABNORMAL
CALCIUM SERPL-MCNC: 9.8 MG/DL (ref 8.6–10)
CANNABINOIDS UR QL SCN: ABNORMAL
CHLORIDE SERPL-SCNC: 104 MMOL/L (ref 98–107)
CREAT SERPL-MCNC: 0.69 MG/DL (ref 0.51–0.95)
D DIMER PPP FEU-MCNC: 0.46 UG/ML FEU (ref 0–0.5)
DEPRECATED HCO3 PLAS-SCNC: 21 MMOL/L (ref 22–29)
DIASTOLIC BLOOD PRESSURE - MUSE: NORMAL MMHG
EGFRCR SERPLBLD CKD-EPI 2021: >90 ML/MIN/1.73M2
EOSINOPHIL # BLD AUTO: 0.1 10E3/UL (ref 0–0.7)
EOSINOPHIL NFR BLD AUTO: 3 %
ERYTHROCYTE [DISTWIDTH] IN BLOOD BY AUTOMATED COUNT: 12.4 % (ref 10–15)
FENTANYL UR QL: ABNORMAL
FLUAV RNA SPEC QL NAA+PROBE: NEGATIVE
FLUBV RNA RESP QL NAA+PROBE: NEGATIVE
GLUCOSE SERPL-MCNC: 110 MG/DL (ref 70–99)
HCT VFR BLD AUTO: 41.6 % (ref 35–47)
HGB BLD-MCNC: 14.1 G/DL (ref 11.7–15.7)
IMM GRANULOCYTES # BLD: 0 10E3/UL
IMM GRANULOCYTES NFR BLD: 0 %
INTERPRETATION ECG - MUSE: NORMAL
LYMPHOCYTES # BLD AUTO: 1.7 10E3/UL (ref 0.8–5.3)
LYMPHOCYTES NFR BLD AUTO: 39 %
MCH RBC QN AUTO: 30.1 PG (ref 26.5–33)
MCHC RBC AUTO-ENTMCNC: 33.9 G/DL (ref 31.5–36.5)
MCV RBC AUTO: 89 FL (ref 78–100)
MONOCYTES # BLD AUTO: 0.3 10E3/UL (ref 0–1.3)
MONOCYTES NFR BLD AUTO: 7 %
NEUTROPHILS # BLD AUTO: 2.1 10E3/UL (ref 1.6–8.3)
NEUTROPHILS NFR BLD AUTO: 49 %
NRBC # BLD AUTO: 0 10E3/UL
NRBC BLD AUTO-RTO: 0 /100
OPIATES UR QL SCN: ABNORMAL
P AXIS - MUSE: 66 DEGREES
PCP QUAL URINE (ROCHE): ABNORMAL
PLATELET # BLD AUTO: 195 10E3/UL (ref 150–450)
POTASSIUM SERPL-SCNC: 3.8 MMOL/L (ref 3.4–5.3)
PR INTERVAL - MUSE: 130 MS
QRS DURATION - MUSE: 76 MS
QT - MUSE: 346 MS
QTC - MUSE: 446 MS
R AXIS - MUSE: 79 DEGREES
RBC # BLD AUTO: 4.68 10E6/UL (ref 3.8–5.2)
RSV RNA SPEC NAA+PROBE: NEGATIVE
SARS-COV-2 RNA RESP QL NAA+PROBE: NEGATIVE
SODIUM SERPL-SCNC: 140 MMOL/L (ref 135–145)
SYSTOLIC BLOOD PRESSURE - MUSE: NORMAL MMHG
T AXIS - MUSE: 60 DEGREES
VENTRICULAR RATE- MUSE: 100 BPM
WBC # BLD AUTO: 4.3 10E3/UL (ref 4–11)

## 2024-04-03 PROCEDURE — 87637 SARSCOV2&INF A&B&RSV AMP PRB: CPT | Performed by: EMERGENCY MEDICINE

## 2024-04-03 PROCEDURE — 99285 EMERGENCY DEPT VISIT HI MDM: CPT | Mod: 25

## 2024-04-03 PROCEDURE — 250N000011 HC RX IP 250 OP 636: Performed by: EMERGENCY MEDICINE

## 2024-04-03 PROCEDURE — 36415 COLL VENOUS BLD VENIPUNCTURE: CPT | Performed by: EMERGENCY MEDICINE

## 2024-04-03 PROCEDURE — 80048 BASIC METABOLIC PNL TOTAL CA: CPT | Performed by: EMERGENCY MEDICINE

## 2024-04-03 PROCEDURE — 85025 COMPLETE CBC W/AUTO DIFF WBC: CPT | Performed by: EMERGENCY MEDICINE

## 2024-04-03 PROCEDURE — 85379 FIBRIN DEGRADATION QUANT: CPT | Performed by: EMERGENCY MEDICINE

## 2024-04-03 PROCEDURE — 71046 X-RAY EXAM CHEST 2 VIEWS: CPT

## 2024-04-03 PROCEDURE — 93005 ELECTROCARDIOGRAM TRACING: CPT

## 2024-04-03 PROCEDURE — 96374 THER/PROPH/DIAG INJ IV PUSH: CPT

## 2024-04-03 PROCEDURE — 80307 DRUG TEST PRSMV CHEM ANLYZR: CPT | Performed by: EMERGENCY MEDICINE

## 2024-04-03 RX ORDER — LORAZEPAM 2 MG/ML
1 INJECTION INTRAMUSCULAR ONCE
Status: COMPLETED | OUTPATIENT
Start: 2024-04-03 | End: 2024-04-03

## 2024-04-03 RX ADMIN — LORAZEPAM 1 MG: 2 INJECTION, SOLUTION INTRAMUSCULAR; INTRAVENOUS at 10:23

## 2024-04-03 ASSESSMENT — COLUMBIA-SUICIDE SEVERITY RATING SCALE - C-SSRS
2. HAVE YOU ACTUALLY HAD ANY THOUGHTS OF KILLING YOURSELF IN THE PAST MONTH?: NO
6. HAVE YOU EVER DONE ANYTHING, STARTED TO DO ANYTHING, OR PREPARED TO DO ANYTHING TO END YOUR LIFE?: NO
1. IN THE PAST MONTH, HAVE YOU WISHED YOU WERE DEAD OR WISHED YOU COULD GO TO SLEEP AND NOT WAKE UP?: YES

## 2024-04-03 ASSESSMENT — ACTIVITIES OF DAILY LIVING (ADL)
ADLS_ACUITY_SCORE: 39

## 2024-04-03 NOTE — ED TRIAGE NOTES
"Pt arrives ambulatory for chest pain/pressure, lightheadedness and shortness of breath for months. Has bee \"putting it off\" and self-medicating with nicotine and cannabis but reports being unable to control symptoms. Of note, pt reports cannabis dependence and bipolar depression and is tearful in triage. Denies HI at this time.         "

## 2024-04-03 NOTE — TELEPHONE ENCOUNTER
"Situation     [facility], no pcp     \"I think I have a lung infection.\"    Background   Patient states she is a daily cannabis smoker      Assessment   Patient sounds teary.     I feel shaky, I don't feel good, my chest and lungs hurt.\"     Patient states she has had pain in the chest that has been worse over the last few days.     The patient states her body aches and fatigue have been going on for months.     Asked how long she has had s/s of a lung infection- \" a couple weeks\"     Patient has not seen anyone for the symptoms she is having.     Patient states Mild  difficulty  at rest and walking.     No audible wheezing on the phone. Talking in full sentences, alert and answering appropriately .     Recommendations.   Patient declined 911,  but states she will go to ER now.       Reason for Disposition   Chest pain lasting longer than 5 minutes and ANY of the following:         Pain is crushing, pressure-like, or heavy         Over 44 years old          Over 30 years old and one cardiac risk factor (e.g diabetes, high blood pressure, high cholesterol, smoker, or family history of heart disease)         History of heart disease (e.g. angina, heart attack, heart failure, bypass surgery, takes nitroglycerin)     Patient states she is a daily cannabis   smoker   Difficulty breathing    Additional Information   Negative: SEVERE difficulty breathing (e.g., struggling for each breath, speaks in single words)   Negative: Difficult to awaken or acting confused (e.g., disoriented, slurred speech)   Negative: Shock suspected (e.g., cold/pale/clammy skin, too weak to stand, low BP, rapid pulse)   Negative: Passed out (i.e., lost consciousness, collapsed and was not responding)    Protocols used: Chest Pain-A-OH    "

## 2024-04-03 NOTE — DISCHARGE INSTRUCTIONS
If you are intoxicated, you may be required to detox at a detox facility before starting treatment. The following are detox facilities that you can self present to. All detox facilities are able to help you complete an assessment prior to discharge if you choose:    [facility]: Arrive at a [facility] for immediate medical evaluation ( Does not treat cannabis withdrawal)     [facility]: [address] [place], [place], [address].         [phone]    [facility]: [address] [place], [place], [address]  [phone]     [facility]): [address] [place], [place], [address]  [phone]     [facility]: [address], [place], [address], [phone]     Ways to help cope with sobriety:  -- Take prescribed medicines as scheduled  -- Keep follow-up appointments  -- Talk to others about your concerns  -- Get regular exercise  -- Practice deep breathing skills  -- Eat a healthy diet  -- Use community resources, including hotline numbers, [facility] crisis and support meetings  -- Stay sober and avoid places/people/things associated with substance use  --Maintain a daily schedule/routine  --Get at least 7-8 hours of sleep per night  --Create a list 10--20 healthy activities that you can do that are enjoyable and do not involve substance use  --Create daily goals (approx. 1-4 goals) per day and work to achieve them throughout the day.     Free Resources:  [place] [facility] ([facility])  [facility] connects people seeking recovery to resources that help foster and sustain long-term recovery. Whether you are seeking resources for treatment, transportation, housing, job training, education, health care or other pathways to recovery, [facility] is a great place to start.  Phone: 117.127.2963. www.[place].org (Great listing of all types of recovery and non-recovery related resources)    Alcoholics Anonymous  Phone:  1-800-ALCOHOL  Website: HTTP://WWW.AA.ORG/  [facility] [place] (329-203-9798 or http://a[place].org)  [facility] [place] (261-795-2178 or www.aastpaul.org)     Narcotics Anonymous  Phone: 445.954.6328  Website: www.Ad.IQ.    [facility] [address], [place], [place],  Phone: [phone]  Drop-in Hours: Monday-Friday 9-11:30 am. By appointment at other times.  Provides: [facility] is a drop-in center on the [place] that provides a safe space for individuals who are homeless and have a history of chemical use. Sobriety is not a requirement but drugs and alcohol are not allowed on the property.  Services: Non-clients can access drop-in services such as Recovery and Harm Reduction Groups, referrals to case management, community activities, shower facilities, and a pool table. Individuals who are homeless and have chemical health needs may be eligible for enrollment into [facility]'s case management program. Clients and  work together to access benefits, treatment, health care, shelter, and external housing resources.    Substance Use Disorder Direct Access Resources    It is recommended that you abstain from all mood altering chemicals. Please contact the sober support hotline (191-289-2791) as needed; phones are answered 24 hours a day, 7 days a week.    To access substance use treatment you must have a comprehensive assessment completed to begin any treatment program.     If uninsured, please contact your county of residence for eligibility screen to substance use disorder evaluation and treatment:    [place] - [phone]   [facility] [phone]   [place] - [phone]   [place] - [phone]   [place] - [phone]   [place] - [phone]   [facility] [phone]   [place] - [phone]     If you have private insurance, call the customer service number on the back of your insurance card to find an in-network substance abuse use disorder  assessment. The ideal provider will be a treatment facility, licensed in the state of [place].     Community [facility] Evaluations: Clients may call their county for a full list of providers - Availability and services listed belo are subject to change, please call the provider to confirm    [facility]  [phone]  [facility] [place], [place], [address]  *Please call the above number to schedule a comprehensive assessment for determination of level of care needs. In person and virtual appointments available Mon-Fri.    [facility], [address], [address], [place] [address] (next to the outpatient lab)    Phone: [phone]   Provides bridging services to people with Opiate Use Disorders (OUD) seeking care. This is a front door to Medication Assisted Treatments (MAT), ages 16+  Walk In hours: Monday-Friday 9:00am-3:00pm    [facility]  [phone]  Walk in Assessments: Mon-Friday 7a-1:45p  [address], [place], [address]    [facility]  Central Access [phone]  [address] [place] [address], [address]  *by appointment only    [facility]  [phone] (phone consultation available )  Locations in: [place], [place], [facility], and [place], [place]  [place] virtual IOP [phone] or visit [email or web address]   Also offers LGBTQ programming     [facility]  [phone]  [address] [place] [address], [address]  [place], [place], [address]  *Currently only offered via telehealth - call to set up an appointment    [facility]  [address] [place], [place], [address]  [facility]  For more information to to make a referral call:  [phone]  Walk-in on   9-11 a.m.    [facility]  [phone]  [address], [place], [address]  *available by appointments only    [facility]  specific  [phone]  [address] [place], [place], [address]  *available by appointment only    [facility]  [phone]  [address] [place], [place], [address]  *walk in assessments available M-F starting at 7 am.    [facility]  [phone]  Locations: [facility], [facility], and [place]  *Walk in assessments availble M-F starting at 8 am -virtual only    [facility]  [phone]  [address] [place], [place] [address]    [facility]  Virtual + Locations: [place], [place], [place], [place], [facility], [facility], [place], [place]   [phone]  *available by appointment only    [facility]  [phone]  [address]  [place], [place], [address]    [facility]  [phone]  [address], [place], [address]  *available by appointment    [facility]  [phone]  [address] [place], [place], [address]  *walk ins available M-TH from 9-3    [facility] program: [phone]  [address] [place], [place], [address]    [place]  [phone]  Same day substance use disorder assessments are available Monday - Friday, via walk-in or by appointment at the [place] location.  [address], [address], [place] [address]     [facility] - adolescent and adult SUDs services  [phone]  Offer services Monday through Friday, as well as evening hours Monday through Thursday. Normally, a first appointment will be scheduled within one week  [email or web address]  Locations all over [place]

## 2024-04-03 NOTE — CONSULTS
Diagnostic Evaluation Consultation  Crisis Assessment    Patient Name: Jonelle Khan  Age:  38 year old  Legal Sex: female  Gender Identity: female  Pronouns:   Race: White  Ethnicity: Not  or   Language: English    Patient was assessed: Virtual: Open-Plug   Crisis Assessment Start Time: 1106 Crisis Assessment Stop Time: 1138  Patient location: [facility] EMERGENCY DEPT ED37    Referral Data and Chief Complaint  Jonelle Khan presents to the ED by  self. Patient is presenting to the ED for the following concerns: Anxiety, Substance use, Health stressors.   Factors that make the mental health crisis life threatening or complex are:  Pt states she has been dealing with a lung infection and anxiety. Pt states she feels that she wants to sleep and smoke marijuana. Pt wants to be detoxed from marijuana and can't do it on her own and finds herself going back to using. Pt states she feels she needs an inpatient detox would be helpful. Pt states when she is feeling any up or down emotions she uses marijuana or nicotine. Pt reports passive suicidal thoughts without any active intent or plans to harm. Pt denied any HI or NSSI. Pt was given resources for KYLAH programs and detox resources.    Informed Consent and Assessment Methods  Explained the crisis assessment process, including applicable information disclosures and limits to confidentiality, assessed understanding of the process, and obtained consent to proceed with the assessment.  Assessment methods included conducting a formal interview with patient, review of medical records, collaboration with medical staff, and obtaining relevant collateral information from family and community providers when available.  : done     Patient response to interventions: acceptance expressed, verbalizes understanding  Coping skills were attempted to reduce the crisis:  Using cannabis. Drawing, colroing, watching TV, music, Playing children and pets.      History of the Crisis   Pt reports hx of anxiety and depression. Pt states in college she did more testing and was diagnosed with bipolar. Pt states when she attended treatment at [facility] in November 2022 she was diagnosed with PTSD. Pt was prescribed Prozac and takes that daily and as prescribed. Pt states she tried therapy in the past and did not find it to be helpful. Last attending in 2022. Pt denied ever trying day treatment, PHP, KYLAH programing or detox.    Brief Psychosocial History  Family:  , Children yes (3 children (10, 5, 3))  Support System:    Employment Status:  employed part-time, homemaker  Source of Income:  salary/wages  Financial Environmental Concerns:  other (see comments) (Doesn't have ton of money, manages.)  Current Hobbies:  exercise/fitness, family functions, social media/computer activities, music, television/movies/videos, meditation, interaction with pets (Dog and 2 cats.)  Barriers in Personal Life:  lack of motivation    Significant Clinical History  Current Anxiety Symptoms:  anxious, excessive worry, shortness of breath or racing heart, racing thoughts, panic attack  Current Depression/Trauma:  excessive guilt, crying or feels like crying, sadness, difficulty concentrating, impaired decision making, irritable, withdrawl/isolation, low self esteem, helplessness, hopelessness, thoughts of death/suicide  Current Somatic Symptoms:  excessive worry, racing thoughts, anxious, shortness of breath or racing heart  Current Psychosis/Thought Disturbance:   (None endorsed)  Current Eating Symptoms:  loss of appetite  Chemical Use History:  Alcohol: None  Benzodiazepines: None  Opiates: None  Cocaine: None  Marijuana: Daily (Everyday all day use.)  Last Use: 04/03/24  Other Use: None  Withdrawal Symptoms: Nausea, Tremors  Addictions:  (None endorsed)   Past diagnosis:  Anxiety Disorder, Depression, PTSD, Substance Use Disorder  Family history:  Anxiety Disorder, Substance Use  Disorder  Past treatment:  Individual therapy, Psychiatric Medication Management  Details of most recent treatment:  Therapy and medication management only. No active therapy. Pt has a PCP who prescribes pt's medications. Last appointment within the past year.  Other relevant history:  Pt denied any legal issues.     Collateral Information  Is there collateral information: Yes     Collateral information name, relationship, phone number:  Johann Reynolds [phone]    What happened today: Hoping she will get set up with someone that can help her and she can talk to but she is going to need to want it.     What is different about patient's functioning: Unknown     Has patient made comments about wanting to kill themselves/others: no    If d/c is recommended, can they take part in safety/aftercare planning:yes     Risk Assessment  Garden Suicide Severity Rating Scale Full Clinical Version:  Suicidal Ideation  Q1 Wish to be Dead (Lifetime): Yes  Q2 Non-Specific Active Suicidal Thoughts (Lifetime): Yes  3. Active Suicidal Ideation with any Methods (Not Plan) Without Intent to Act (Lifetime): No  Q4 Active Suicidal Ideation with Some Intent to Act, Without Specific Plan (Lifetime): No  Q5 Active Suicidal Ideation with Specific Plan and Intent (Lifetime): No  Q6 Suicide Behavior (Lifetime): no     Suicidal Behavior (Lifetime)  Actual Attempt (Lifetime): No  Has subject engaged in non-suicidal self-injurious behavior? (Lifetime): No  Interrupted Attempts (Lifetime): No  Aborted or Self-Interrupted Attempt (Lifetime): No  Preparatory Acts or Behavior (Lifetime): No    Garden Suicide Severity Rating Scale Recent:   Suicidal Ideation (Recent)  Q1 Wished to be Dead (Past Month): yes  Q2 Suicidal Thoughts (Past Month): no  Level of Risk per Screen: low risk  Intensity of Ideation (Recent)  Most Severe Ideation Rating (Past 1 Month): 3  Description of Most Severe Ideation (Past 1 Month): 3 in past month, 1 now  Frequency (Past  1 Month): Daily or almost daily  Duration (Past 1 Month): Fleeting, few seconds or minutes  Controllability (Past 1 Month): Can control thoughts with little difficulty  Deterrents (Past 1 Month): Deterrents definitely stopped you from attempting suicide  Reasons for Ideation (Past 1 Month): Does not apply  Suicidal Behavior (Recent)  Actual Attempt (Past 3 Months): No  Total Number of Actual Attempts (Past 3 Months): 0  Has subject engaged in non-suicidal self-injurious behavior? (Past 3 Months): No  Interrupted Attempts (Past 3 Months): No  Total Number of Interrupted Attempts (Past 3 Months): 0  Aborted or Self-Interrupted Attempt (Past 3 Months): No  Total Number of Aborted or Self-Interrupted Attempts (Past 3 Months): 0  Preparatory Acts or Behavior (Past 3 Months): No  Total Number of Preparatory Acts (Past 3 Months): 0    Environmental or Psychosocial Events: ongoing abuse of substances, challenging interpersonal relationships, worsening chronic illness, other (see comment) (Managing kids and working part time is stressful.)  Protective Factors: Protective Factors: strong bond to family unit, community support, or employment, lives in a responsibly safe and stable environment, responsibilities and duties to others, including pets and children, sense of belonging, cultural, spiritual , or Jewish beliefs associated with meaning and value in life, help seeking, constructive use of leisure time, enjoyable activities, resilience    Does the patient have thoughts of harming others? Feels Like Hurting Others: no  Previous Attempt to Hurt Others: no  Current presentation:  (None endorsed.)  Is the patient engaging in sexually inappropriate behavior?: no    Is the patient engaging in sexually inappropriate behavior?  no        Mental Status Exam   Affect:  (Crying and tearful)  Appearance: Appropriate  Attention Span/Concentration: Attentive  Eye Contact: Engaged    Fund of Knowledge: Appropriate   Language /Speech  Content: Fluent  Language /Speech Volume: Normal  Language /Speech Rate/Productions: Normal  Recent Memory: Intact  Remote Memory: Intact  Mood: Anxious  Orientation to Person: Yes   Orientation to Place: Yes  Orientation to Time of Day: Yes  Orientation to Date: Yes     Situation (Do they understand why they are here?): Yes  Psychomotor Behavior: Agitated  Thought Content: Clear  Thought Form: Intact      Medication  Psychotropic medications: None     Current Care Team  Patient Care Team:  No Ref-Primary, Physician as PCP - Kristal Bush CNP as Assigned PCP  Sanjay Ballesteros APRN CNP as Nurse Practitioner (Family Medicine)  David Grace MD as Assigned Allergy Provider    Diagnosis  Patient Active Problem List   Diagnosis    Nonallergic rhinitis    Health Care Home    Bipolar 2 disorder (H)    Migraine without aura and without status migrainosus, not intractable    Mild intermittent asthma without complication    Anxiety    ONELIA (obstructive sleep apnea)    Vitamin D deficiency    S/P  section    Cannabis dependence, daily use (H)     Primary Problem This Admission  Active Hospital Problems  F12.2  Cannabis dependence, daily use (H)    F41.1  Anxiety    Clinical Summary and Substantiation of Recommendations   After therapeutic assessment, intervention, and aftercare planning by ED care team and LM and in consultation with attending provider, the patient's circumstances and mental state were appropriate for outpatient management. It is the recommendation of this clinician that pt discharge with OP MH support. Currently the pt is not presenting as an acute risk to self or others due to the following factors: Pt reports having suicidal thoughts to be dead with no intent or plans to follow through. Pt states she feels she needs to detox of cannabis and start medications to help stay off cannabis and is willing to do so now. Pt developed a safety plan.    Patient coping skills attempted to reduce  the crisis:  Using cannabis. Drawing, colroing, watching TV, music, Playing children and pets.    Disposition  Recommended disposition: Detox, Rule 25/KYLAH Assessment        Reviewed case and recommendations with attending provider. Attending Name: Dr Willard       Attending concurs with disposition: yes       Patient and/or validated legal guardian concurs with disposition: yes       Final disposition:  discharge    Legal status on admission: Voluntary/Patient has signed consent for treatment    Assessment Details   Total duration spent with the patient: 32 min     CPT code(s) utilized: 80954 - Psychotherapy for Crisis - 60 (30-74*) min    Madalyn Rodríguez [facility], Psychotherapist  [facility] - Triage & Transition Services  Callback: [phone]

## 2024-04-03 NOTE — ED PROVIDER NOTES
History     Chief Complaint:  Chest Pain     The history is provided by the patient.      Jonelle Khan is a 38 year old female with history of MDD, LUIS FELIPE, BPII, PTSD, CUD, pneumonia, and asthma who presents to the ED for evaluation of chest pain. Jonelle reports worsening depression symptoms and struggles with cannabis and nicotine use. She uses cannabis multiple times daily since she was hospitalized 22-22 for severe depression, anxiety, and suicidal ideation. She has tried many times to wean her cannabis and nicotine use but develops panic, diaphoresis, and vomiting, which resolves when she uses again. She endorses excessive sleep, social withdrawal, and chest pain with inhalation or exertion ongoing for months. She works as a /PCA. Today she called her doctor to schedule an appointment and was directed to the ED. Patient feels that she needs chemical detox and a mood stabilizer. She is currently taking 40 mg fluoxetine (Prozac).     Independent Historian:   None - Patient Only    Review of External Notes:   I reviewed patient's [facility] discharge note from 22.    Medications:    Albuterol  Budesonide-formoterol  Fluoxetine  Lorazepam    Past Medical History:    LUIS FELIPE  MDD  Bipolar 2 disorder  Asthma  Nonallergic rhinitis  Migraine  ONELIA  Vitamin D deficiency   Kidney infection    Past Surgical History:     section x3  BSO  Cholecystectomy  Chalazion removal under general    Physical Exam   Patient Vitals for the past 24 hrs:   BP Temp Temp src Pulse Resp SpO2 Height Weight   24 1134 -- -- -- 95 -- 100 % -- --   24 1133 -- -- -- 90 -- 99 % -- --   24 1132 -- -- -- 84 -- 98 % -- --   24 1131 -- -- -- 87 -- 100 % -- --   24 1130 (!) 142/93 -- -- 83 -- 95 % -- --   24 1030 (!) 147/100 -- -- 74 -- 100 % -- --   24 1004 (!) 146/89 97.4  F (36.3  C) Oral 91 23 100 % -- --   24 0955 (!) 153/103 98.3  F (36.8  C) Temporal (!) 125  "25 99 % 1.575 m (5' 2\") 65.8 kg (145 lb)     Physical Exam  General: Patient is anxious, tearful, cooperative.   HENT:  Normal nose, oropharynx.  Eyes: EOMI. Normal conjunctiva.  Neck:  Normal range of motion and appearance.   Cardiovascular:  tachycardic rate, regular rhythm and normal heart sounds.   Pulmonary/Chest:  Effort normal. No wheezing or crackles.  Abdominal: Soft. No distension or tenderness.     Musculoskeletal: Normal range of motion. No edema or tenderness.   Neurological: oriented, normal strength, sensation, and coordination.   Skin: Warm and dry. No rash or bruising.   Psychiatric: no psychosis; anxious, crying.      Emergency Department Course   ECG  ECG taken at 0957, ECG read at 1000  Normal sinus rhythm  Normal ECG   Rate 100 bpm. NH interval 130 ms. QRS duration 76 ms. QT/QTc 346/446 ms. P-R-T axes 66 79 60.     Imaging:  XR Chest 2 Views   Final Result   IMPRESSION: Cardiopericardial silhouette is within normal limits. No   focal airspace consolidation. No pleural effusion. No discernible   pneumothorax. No acute displaced fracture.      PASHA GOODRICH MD            SYSTEM ID:  [id number]         Report per radiology    Laboratory:  Labs Ordered and Resulted from Time of ED Arrival to Time of ED Departure   BASIC METABOLIC PANEL - Abnormal       Result Value    Sodium 140      Potassium 3.8      Chloride 104      Carbon Dioxide (CO2) 21 (*)     Anion Gap 15      Urea Nitrogen 5.7 (*)     Creatinine 0.69      GFR Estimate >90      Calcium 9.8      Glucose 110 (*)    URINE DRUG SCREEN PANEL - Abnormal    Amphetamines Urine Screen Negative      Barbituates Urine Screen Negative      Benzodiazepine Urine Screen Negative      Cannabinoids Urine Screen Positive (*)     Cocaine Urine Screen Negative      Fentanyl Qual Urine Screen Negative      Opiates Urine Screen Negative      PCP Urine Screen Negative     D DIMER QUANTITATIVE - Normal    D-Dimer Quantitative 0.46     INFLUENZA A/B, RSV, & " SARS-COV2 PCR - Normal    Influenza A PCR Negative      Influenza B PCR Negative      RSV PCR Negative      SARS CoV2 PCR Negative     CBC WITH PLATELETS AND DIFFERENTIAL    WBC Count 4.3      RBC Count 4.68      Hemoglobin 14.1      Hematocrit 41.6      MCV 89      MCH 30.1      MCHC 33.9      RDW 12.4      Platelet Count 195      % Neutrophils 49      % Lymphocytes 39      % Monocytes 7      % Eosinophils 3      % Basophils 2      % Immature Granulocytes 0      NRBCs per 100 WBC 0      Absolute Neutrophils 2.1      Absolute Lymphocytes 1.7      Absolute Monocytes 0.3      Absolute Eosinophils 0.1      Absolute Basophils 0.1      Absolute Immature Granulocytes 0.0      Absolute NRBCs 0.0          Procedures   None    Emergency Department Course & Assessments:  Assessments/Consultations/Discussion of Management or Tests:  ED Course as of 24 1225      1007 I obtained history and examined the patient as noted above.    1140 I spoke with DEC regarding their assessment of the patient. They are working on inpatient chemical treatment placement.    1147 I spoke with DEC again. There is no availability for cannabis detox.   1218 I rechecked the patient and explained findings. We discussed plans for discharge and the patient is comfortable with this plan.        Interventions:  Medications   LORazepam (ATIVAN) injection 1 mg (1 mg Intravenous $Given 4/3/24 1023)        Independent Interpretation (X-rays, CTs, rhythm strip):  None    Social Determinants of Health affecting care:   None and Stress/Adjustment Disorders    Disposition:  The patient was discharged to home.     Impression & Plan    CMS Diagnoses: None    MIPS (If applicable):  N/A    Medical Decision Makin-year-old female with a history of anxiety, depression, PTSD, and daily marijuana use arrives with complaints of chest discomfort and shortness of breath ongoing for couple months.  She is afebrile with a normal cardiopulmonary exam  and workup, which is included a chest x-ray, EKG, and labs including a D-dimer.  She is medically cleared. A DEC eval was offered.  Patient is requesting assistance with her ongoing daily marijuana use and is requesting detoxification and treatment.  The DEC is in agreement.  She does have some passive suicidal thoughts, but no active plan.  A urine drug screen is pending;  [facility] has reported that they do not do inpatient marijuana detoxification; DEC has provided numerous outpatient options, which were also discussed with patient.     Diagnosis:    ICD-10-CM    1. Cannabis abuse, continuous  F12.10       2. Depression with anxiety  F41.8           Discharge Medications:  New Prescriptions    No medications on file        Scribe Disclosure:  Ivon JACKSON Hailie, am serving as a scribe at 10:04 AM on 4/3/2024 to document services personally performed by Shar Willard MD based on my observations and the provider's statements to me.  4/3/2024   Shar Willard MD Isaacson, Brian A, MD  04/03/24 1531

## 2024-04-24 DIAGNOSIS — F41.9 ANXIETY: ICD-10-CM

## 2024-04-24 RX ORDER — FLUOXETINE 40 MG/1
40 CAPSULE ORAL DAILY
Qty: 90 CAPSULE | Refills: 0 | Status: SHIPPED | OUTPATIENT
Start: 2024-04-24 | End: 2024-09-16

## 2024-04-24 NOTE — TELEPHONE ENCOUNTER
Prescription approved per [facility] Refill Protocol.  Herminia Jackson, RN  [facility] Triage Nurse

## 2024-04-27 ENCOUNTER — OFFICE VISIT (OUTPATIENT)
Dept: URGENT CARE | Facility: URGENT CARE | Age: 39
End: 2024-04-27
Payer: COMMERCIAL

## 2024-04-27 VITALS
HEART RATE: 98 BPM | DIASTOLIC BLOOD PRESSURE: 87 MMHG | BODY MASS INDEX: 26.8 KG/M2 | TEMPERATURE: 99.1 F | OXYGEN SATURATION: 98 % | RESPIRATION RATE: 16 BRPM | SYSTOLIC BLOOD PRESSURE: 122 MMHG | WEIGHT: 146.5 LBS

## 2024-04-27 DIAGNOSIS — R07.0 THROAT PAIN: Primary | ICD-10-CM

## 2024-04-27 DIAGNOSIS — R52 BODY ACHES: ICD-10-CM

## 2024-04-27 DIAGNOSIS — K21.00 GASTROESOPHAGEAL REFLUX DISEASE WITH ESOPHAGITIS, UNSPECIFIED WHETHER HEMORRHAGE: ICD-10-CM

## 2024-04-27 DIAGNOSIS — R53.81 MALAISE: ICD-10-CM

## 2024-04-27 LAB
BASOPHILS # BLD AUTO: 0.1 10E3/UL (ref 0–0.2)
BASOPHILS NFR BLD AUTO: 1 %
DEPRECATED S PYO AG THROAT QL EIA: NEGATIVE
EOSINOPHIL # BLD AUTO: 0.1 10E3/UL (ref 0–0.7)
EOSINOPHIL NFR BLD AUTO: 2 %
ERYTHROCYTE [DISTWIDTH] IN BLOOD BY AUTOMATED COUNT: 11.8 % (ref 10–15)
HCT VFR BLD AUTO: 40.5 % (ref 35–47)
HGB BLD-MCNC: 13.5 G/DL (ref 11.7–15.7)
IMM GRANULOCYTES # BLD: 0 10E3/UL
IMM GRANULOCYTES NFR BLD: 0 %
LYMPHOCYTES # BLD AUTO: 1.8 10E3/UL (ref 0.8–5.3)
LYMPHOCYTES NFR BLD AUTO: 33 %
MCH RBC QN AUTO: 29.9 PG (ref 26.5–33)
MCHC RBC AUTO-ENTMCNC: 33.3 G/DL (ref 31.5–36.5)
MCV RBC AUTO: 90 FL (ref 78–100)
MONOCYTES # BLD AUTO: 0.6 10E3/UL (ref 0–1.3)
MONOCYTES NFR BLD AUTO: 11 %
NEUTROPHILS # BLD AUTO: 3 10E3/UL (ref 1.6–8.3)
NEUTROPHILS NFR BLD AUTO: 54 %
PLATELET # BLD AUTO: 196 10E3/UL (ref 150–450)
RBC # BLD AUTO: 4.51 10E6/UL (ref 3.8–5.2)
WBC # BLD AUTO: 5.6 10E3/UL (ref 4–11)

## 2024-04-27 PROCEDURE — 87651 STREP A DNA AMP PROBE: CPT | Performed by: NURSE PRACTITIONER

## 2024-04-27 PROCEDURE — 36415 COLL VENOUS BLD VENIPUNCTURE: CPT | Performed by: NURSE PRACTITIONER

## 2024-04-27 PROCEDURE — 85025 COMPLETE CBC W/AUTO DIFF WBC: CPT | Performed by: NURSE PRACTITIONER

## 2024-04-27 PROCEDURE — 99214 OFFICE O/P EST MOD 30 MIN: CPT | Performed by: NURSE PRACTITIONER

## 2024-04-27 RX ORDER — SUCRALFATE ORAL 1 G/10ML
1 SUSPENSION ORAL 4 TIMES DAILY
Qty: 414 ML | Refills: 0 | Status: SHIPPED | OUTPATIENT
Start: 2024-04-27 | End: 2024-09-16

## 2024-04-27 NOTE — PATIENT INSTRUCTIONS
Results for orders placed or performed in visit on 04/27/24   CBC with platelets and differential     Status: None   Result Value Ref Range    WBC Count 5.6 4.0 - 11.0 10e3/uL    RBC Count 4.51 3.80 - 5.20 10e6/uL    Hemoglobin 13.5 11.7 - 15.7 g/dL    Hematocrit 40.5 35.0 - 47.0 %    MCV 90 78 - 100 fL    MCH 29.9 26.5 - 33.0 pg    MCHC 33.3 31.5 - 36.5 g/dL    RDW 11.8 10.0 - 15.0 %    Platelet Count 196 150 - 450 10e3/uL    % Neutrophils 54 %    % Lymphocytes 33 %    % Monocytes 11 %    % Eosinophils 2 %    % Basophils 1 %    % Immature Granulocytes 0 %    Absolute Neutrophils 3.0 1.6 - 8.3 10e3/uL    Absolute Lymphocytes 1.8 0.8 - 5.3 10e3/uL    Absolute Monocytes 0.6 0.0 - 1.3 10e3/uL    Absolute Eosinophils 0.1 0.0 - 0.7 10e3/uL    Absolute Basophils 0.1 0.0 - 0.2 10e3/uL    Absolute Immature Granulocytes 0.0 <=0.4 10e3/uL   Streptococcus A Rapid Screen w/Reflex to PCR - Clinic Collect     Status: Normal    Specimen: Throat; Swab   Result Value Ref Range    Group A Strep antigen Negative Negative   CBC with platelets and differential     Status: None    Narrative    The following orders were created for panel order CBC with platelets and differential.  Procedure                               Abnormality         Status                     ---------                               -----------         ------                     CBC with platelets and d...[[id number]]                      Final result                 Please view results for these tests on the individual orders.     RST negative  TC swab pending.   CBC normal.        Push fluids  Lots of handwashing.   Ibuprofen as needed for fever or pain  Delsymor dayquil/nyquil for cough as needed     Rest as able.   Will call if any other labs positive.    F/u in the clinic if symptoms persist or worsen.

## 2024-04-27 NOTE — PROGRESS NOTES
Assessment & Plan     Throat pain  - Streptococcus A Rapid Screen w/Reflex to PCR - Clinic Collect  - Group A Streptococcus PCR Throat Swab    Malaise    Body aches  - CBC with platelets and differential  - CBC with platelets and differential    Gastroesophageal reflux disease with esophagitis, unspecified whether hemorrhage  - sucralfate (CARAFATE) 1 GM/10ML suspension  Dispense: 414 mL; Refill: 0     Patient Instructions     Results for orders placed or performed in visit on 04/27/24   CBC with platelets and differential     Status: None   Result Value Ref Range    WBC Count 5.6 4.0 - 11.0 10e3/uL    RBC Count 4.51 3.80 - 5.20 10e6/uL    Hemoglobin 13.5 11.7 - 15.7 g/dL    Hematocrit 40.5 35.0 - 47.0 %    MCV 90 78 - 100 fL    MCH 29.9 26.5 - 33.0 pg    MCHC 33.3 31.5 - 36.5 g/dL    RDW 11.8 10.0 - 15.0 %    Platelet Count 196 150 - 450 10e3/uL    % Neutrophils 54 %    % Lymphocytes 33 %    % Monocytes 11 %    % Eosinophils 2 %    % Basophils 1 %    % Immature Granulocytes 0 %    Absolute Neutrophils 3.0 1.6 - 8.3 10e3/uL    Absolute Lymphocytes 1.8 0.8 - 5.3 10e3/uL    Absolute Monocytes 0.6 0.0 - 1.3 10e3/uL    Absolute Eosinophils 0.1 0.0 - 0.7 10e3/uL    Absolute Basophils 0.1 0.0 - 0.2 10e3/uL    Absolute Immature Granulocytes 0.0 <=0.4 10e3/uL   Streptococcus A Rapid Screen w/Reflex to PCR - Clinic Collect     Status: Normal    Specimen: Throat; Swab   Result Value Ref Range    Group A Strep antigen Negative Negative   CBC with platelets and differential     Status: None    Narrative    The following orders were created for panel order CBC with platelets and differential.  Procedure                               Abnormality         Status                     ---------                               -----------         ------                     CBC with platelets and d...[[id number]]                      Final result                 Please view results for these tests on the individual orders.     RST  negative  TC swab pending.   CBC normal.        Push fluids  Lots of handwashing.   Ibuprofen as needed for fever or pain  Delsymor dayquil/nyquil for cough as needed     Rest as able.   Will call if any other labs positive.    F/u in the clinic if symptoms persist or worsen.        Return in about 1 week (around 5/4/2024) for with regular provider if symptoms persist.    JUAN Schneider CNP  [facility]KATERINA Sal is a 38 year old female who presents to clinic today for the following health issues:  Chief Complaint   Patient presents with    Throat Pain     39 yo F presents with the following complaint headaches, throat pain, abdominal pain , neck and shoulder pain, sweats , body ache onset 1 month          HPI    URI Adult    Onset of symptoms was 1 month(s) ago.  Course of illness is waxing and waning.    Severity moderate  Current and Associated symptoms: chills, sweats, headache, body aches, fatigue, and nausea  Treatment measures tried include Tylenol/Ibuprofen, Fluids, and Rest.  Predisposing factors include None.    Review of Systems  Constitutional, HEENT, cardiovascular, pulmonary, gi and gu systems are negative, except as otherwise noted.      Objective    /87   Pulse 98   Temp 99.1  F (37.3  C)   Resp 16   Wt 66.5 kg (146 lb 8 oz)   LMP 04/02/2024 (Exact Date)   SpO2 98%   BMI 26.80 kg/m    Physical Exam   GENERAL: alert and no distress  EYES: Eyes grossly normal to inspection, PERRL and conjunctivae and sclerae normal  HENT: ear canals and TM's normal, nose and mouth without ulcers or lesions  NECK: no adenopathy, no asymmetry, masses, or scars  RESP: lungs clear to auscultation - no rales, rhonchi or wheezes  CV: regular rate and rhythm, normal S1 S2, no S3 or S4, no murmur, click or rub, no peripheral edema  ABDOMEN: soft, nontender, no hepatosplenomegaly, no masses and bowel sounds normal  MS: no gross musculoskeletal defects noted, no  edema

## 2024-04-28 ENCOUNTER — MYC REFILL (OUTPATIENT)
Dept: FAMILY MEDICINE | Facility: CLINIC | Age: 39
End: 2024-04-28
Payer: COMMERCIAL

## 2024-04-28 DIAGNOSIS — J45.20 MILD INTERMITTENT ASTHMA WITHOUT COMPLICATION: ICD-10-CM

## 2024-04-28 LAB — GROUP A STREP BY PCR: NOT DETECTED

## 2024-04-28 ASSESSMENT — PATIENT HEALTH QUESTIONNAIRE - PHQ9
10. IF YOU CHECKED OFF ANY PROBLEMS, HOW DIFFICULT HAVE THESE PROBLEMS MADE IT FOR YOU TO DO YOUR WORK, TAKE CARE OF THINGS AT HOME, OR GET ALONG WITH OTHER PEOPLE: VERY DIFFICULT
SUM OF ALL RESPONSES TO PHQ QUESTIONS 1-9: 20
SUM OF ALL RESPONSES TO PHQ QUESTIONS 1-9: 20

## 2024-04-28 ASSESSMENT — ASTHMA QUESTIONNAIRES
QUESTION_4 LAST FOUR WEEKS HOW OFTEN HAVE YOU USED YOUR RESCUE INHALER OR NEBULIZER MEDICATION (SUCH AS ALBUTEROL): NOT AT ALL
QUESTION_3 LAST FOUR WEEKS HOW OFTEN DID YOUR ASTHMA SYMPTOMS (WHEEZING, COUGHING, SHORTNESS OF BREATH, CHEST TIGHTNESS OR PAIN) WAKE YOU UP AT NIGHT OR EARLIER THAN USUAL IN THE MORNING: ONCE OR TWICE
ACT_TOTALSCORE: 23
QUESTION_5 LAST FOUR WEEKS HOW WOULD YOU RATE YOUR ASTHMA CONTROL: WELL CONTROLLED
QUESTION_2 LAST FOUR WEEKS HOW OFTEN HAVE YOU HAD SHORTNESS OF BREATH: NOT AT ALL
QUESTION_1 LAST FOUR WEEKS HOW MUCH OF THE TIME DID YOUR ASTHMA KEEP YOU FROM GETTING AS MUCH DONE AT WORK, SCHOOL OR AT HOME: NONE OF THE TIME
ACT_TOTALSCORE: 23

## 2024-04-28 ASSESSMENT — ANXIETY QUESTIONNAIRES
8. IF YOU CHECKED OFF ANY PROBLEMS, HOW DIFFICULT HAVE THESE MADE IT FOR YOU TO DO YOUR WORK, TAKE CARE OF THINGS AT HOME, OR GET ALONG WITH OTHER PEOPLE?: SOMEWHAT DIFFICULT
2. NOT BEING ABLE TO STOP OR CONTROL WORRYING: NOT AT ALL
6. BECOMING EASILY ANNOYED OR IRRITABLE: NEARLY EVERY DAY
4. TROUBLE RELAXING: MORE THAN HALF THE DAYS
GAD7 TOTAL SCORE: 7
7. FEELING AFRAID AS IF SOMETHING AWFUL MIGHT HAPPEN: NOT AT ALL
GAD7 TOTAL SCORE: 7
GAD7 TOTAL SCORE: 7
5. BEING SO RESTLESS THAT IT IS HARD TO SIT STILL: NOT AT ALL
IF YOU CHECKED OFF ANY PROBLEMS ON THIS QUESTIONNAIRE, HOW DIFFICULT HAVE THESE PROBLEMS MADE IT FOR YOU TO DO YOUR WORK, TAKE CARE OF THINGS AT HOME, OR GET ALONG WITH OTHER PEOPLE: SOMEWHAT DIFFICULT
3. WORRYING TOO MUCH ABOUT DIFFERENT THINGS: SEVERAL DAYS
1. FEELING NERVOUS, ANXIOUS, OR ON EDGE: SEVERAL DAYS
7. FEELING AFRAID AS IF SOMETHING AWFUL MIGHT HAPPEN: NOT AT ALL

## 2024-04-29 ENCOUNTER — OFFICE VISIT (OUTPATIENT)
Dept: FAMILY MEDICINE | Facility: CLINIC | Age: 39
End: 2024-04-29
Payer: COMMERCIAL

## 2024-04-29 VITALS
WEIGHT: 149.5 LBS | SYSTOLIC BLOOD PRESSURE: 119 MMHG | TEMPERATURE: 99.5 F | DIASTOLIC BLOOD PRESSURE: 84 MMHG | HEART RATE: 120 BPM | BODY MASS INDEX: 26.49 KG/M2 | OXYGEN SATURATION: 99 % | RESPIRATION RATE: 22 BRPM | HEIGHT: 63 IN

## 2024-04-29 DIAGNOSIS — R10.12 LUQ ABDOMINAL PAIN: ICD-10-CM

## 2024-04-29 DIAGNOSIS — R20.2 TINGLING OF LEFT UPPER EXTREMITY: Primary | ICD-10-CM

## 2024-04-29 DIAGNOSIS — F41.0 PANIC ATTACK: ICD-10-CM

## 2024-04-29 DIAGNOSIS — F41.9 ANXIETY: ICD-10-CM

## 2024-04-29 DIAGNOSIS — F31.81 BIPOLAR 2 DISORDER (H): ICD-10-CM

## 2024-04-29 DIAGNOSIS — Z72.0 TOBACCO ABUSE: ICD-10-CM

## 2024-04-29 DIAGNOSIS — Z20.818 STREP THROAT EXPOSURE: ICD-10-CM

## 2024-04-29 LAB — ERYTHROCYTE [SEDIMENTATION RATE] IN BLOOD BY WESTERGREN METHOD: 8 MM/HR (ref 0–20)

## 2024-04-29 PROCEDURE — 86431 RHEUMATOID FACTOR QUANT: CPT

## 2024-04-29 PROCEDURE — 83690 ASSAY OF LIPASE: CPT

## 2024-04-29 PROCEDURE — 86038 ANTINUCLEAR ANTIBODIES: CPT

## 2024-04-29 PROCEDURE — 99214 OFFICE O/P EST MOD 30 MIN: CPT

## 2024-04-29 PROCEDURE — 85652 RBC SED RATE AUTOMATED: CPT

## 2024-04-29 PROCEDURE — 86200 CCP ANTIBODY: CPT

## 2024-04-29 PROCEDURE — 36415 COLL VENOUS BLD VENIPUNCTURE: CPT

## 2024-04-29 PROCEDURE — 86140 C-REACTIVE PROTEIN: CPT

## 2024-04-29 PROCEDURE — 82150 ASSAY OF AMYLASE: CPT

## 2024-04-29 RX ORDER — PROPRANOLOL HYDROCHLORIDE 10 MG/1
10 TABLET ORAL 3 TIMES DAILY
Qty: 90 TABLET | Refills: 3 | Status: SHIPPED | OUTPATIENT
Start: 2024-04-29

## 2024-04-29 RX ORDER — FLUOXETINE 10 MG/1
CAPSULE ORAL
Qty: 12 CAPSULE | Refills: 0 | Status: SHIPPED | OUTPATIENT
Start: 2024-04-29 | End: 2024-09-16

## 2024-04-29 RX ORDER — DULOXETIN HYDROCHLORIDE 30 MG/1
30 CAPSULE, DELAYED RELEASE ORAL 2 TIMES DAILY
Qty: 60 CAPSULE | Refills: 1 | Status: SHIPPED | OUTPATIENT
Start: 2024-04-29 | End: 2024-09-16

## 2024-04-29 RX ORDER — ALBUTEROL SULFATE 90 UG/1
2 AEROSOL, METERED RESPIRATORY (INHALATION) EVERY 6 HOURS
Qty: 18 G | Refills: 1 | Status: SHIPPED | OUTPATIENT
Start: 2024-04-29

## 2024-04-29 RX ORDER — AMOXICILLIN 500 MG/1
500 CAPSULE ORAL 2 TIMES DAILY
Qty: 20 CAPSULE | Refills: 0 | Status: SHIPPED | OUTPATIENT
Start: 2024-04-29 | End: 2024-05-09

## 2024-04-29 NOTE — PROGRESS NOTES
Assessment & Plan     (R20.2) Tingling of left upper extremity  (primary encounter diagnosis)  Unclear etiology. Has had intermittent, mostly affecting the top of the left forearm and hand. Most likely due to inflammation or overuse given improvement with biofreeze and anti-inflammatories; however will check autoimmune labs and she requested Orthopedic evaluation to discuss possible impingement vs carpal tunnel vs need for EMG.   Plan: Anti Nuclear Cecilia IgG by IFA with Reflex,         Rheumatoid factor, Cyclic Citrullinated Peptide        Antibody IgG, ESR: Erythrocyte sedimentation         rate, CRP, inflammation, Orthopedic          Referral          (Z72.0) Tobacco abuse  Quit smoking but continues to use nicotine patches. Refilled today. No acute concerns at this time. Encouraged ongoing cessation from tobacco. Does report ongoing use of cannabis. Encouraged she try to abstain from cannabis use as well.   Plan: nicotine (NICODERM CQ) 7 MG/24HR 24 hr patch          (F41.0) Panic attack  Has not been using propranolol as was not sure if she should continue taking. Did notice improvement in anxiety symptoms while on medication. Will restart propranolol today and re-assess in 1 month. See below as well for other medication changes.   Plan: propranolol (INDERAL) 10 MG tablet          (F31.81) Bipolar 2 disorder (H)  Currently on Fluoxetine only. She is not sure of bipolar diagnosis but was told she had PTSD and severe anxiety when she was in the hospital in the past. She has never been assess by Psychiatry as far as she is aware, no recent assessments. Discussed benefits of Psychiatry assessment for medication management given persistent uncontrolled symptoms with plan to return to PCP when on regimen. She is interested in this option. Uses Fluoxetine currently but feels this is not helping with mood. Discussed benefits and risks of changing medication, would prefer to try medication now and follow up with  "Psychiatry. Discussed options and ultimately will trial duloxetine. Given taper instructions for fluoxetine to duloxetine. Follow up in 1 month for recheck; sooner with any acute concerns.   Plan: REVIEW OF HEALTH MAINTENANCE PROTOCOL ORDERS,         DULoxetine (CYMBALTA) 30 MG capsule, FLUoxetine        (PROZAC) 10 MG capsule, Adult Mental Health          Referral          (F41.9) Anxiety  See above.   Plan: REVIEW OF HEALTH MAINTENANCE PROTOCOL ORDERS,         DULoxetine (CYMBALTA) 30 MG capsule, FLUoxetine        (PROZAC) 10 MG capsule, Adult Mental [facility]          Referral          (Z20.818) Strep throat exposure  Known strep exposure with daughter. Seen when symptoms first started with negative strep but ongoing symptoms. With known exposure will treat for likely strep pharyngitis with amoxicillin. Follow up if worsening or no improvement.   Plan: amoxicillin (AMOXIL) 500 MG capsule          (R10.12) LUQ abdominal pain  Unclear etiology. Could be anxiety related or possible reflux. Was given reflux medication which she has not been taking. Check lipase and amylase today as well to assess pancreas. Recommendations pending lab results.   Plan: Lipase, Amylase          BMI  Estimated body mass index is 26.91 kg/m  as calculated from the following:    Height as of this encounter: 1.588 m (5' 2.5\").    Weight as of this encounter: 67.8 kg (149 lb 8 oz).       Follow up in 1 month for recheck given adjustment to medications; sooner with any acute concerns.    Shayne Sal is a 38 year old, presenting for the following health issues:  No chief complaint on file.        4/29/2024     2:00 PM   Additional Questions   Roomed by Argelia Krishnan     History of Present Illness       Mental Health Follow-up:  Patient presents to follow-up on Depression & Anxiety.Patient's depression since last visit has been:  Bad  The patient is having other symptoms associated with depression.  Patient's anxiety " "since last visit has been:  Medium  The patient is having other symptoms associated with anxiety.  Any significant life events: job concerns, grief or loss and health concerns  Patient is not feeling anxious or having panic attacks.  Patient has no concerns about alcohol or drug use.    Headaches:   Since the patient's last clinic visit, headaches are: worsened  The patient is getting headaches:  Daily headache and 2 migraines this month  She is not able to do normal daily activities when she has a migraine.  The patient is taking the following rescue/relief medications:  Tylenol   Patient states \"I get only a small amount of relief\" from the rescue/relief medications.   The patient is taking the following medications to prevent migraines:  No medications to prevent migraines  In the past 4 weeks, the patient has gone to an Urgent Care or Emergency Room 2 times times due to headaches.    Reason for visit:  Medication Consult and fibromyalgia blood tests    She eats 2-3 servings of fruits and vegetables daily.She consumes 1 sweetened beverage(s) daily.She exercises with enough effort to increase her heart rate 9 or less minutes per day.  She exercises with enough effort to increase her heart rate 3 or less days per week.   She is taking medications regularly.     -Has been seen by multiple providers for multiple conditions but doesn't have one person as a point of contact.     Depression: Was on Sertraline in the past, now on fluoxetine 40 mg daily.     Muscle Spasms: Flexeril PRN.    Nicotine Patches: Currently using nicotine patches 7 mg daily. Stopped smoking and using cannabis.     Trazodone, Prazosin 1 mg for PTSD and sleep-has not been using these medications for several months.    Biofreeze for tingling/numbness in both hands and wrists with some improvement.       Review of Systems  Constitutional, HEENT, cardiovascular, pulmonary, gi and gu systems are negative, except as otherwise noted.        Objective  " "  /84 (BP Location: Right arm, Patient Position: Sitting, Cuff Size: Adult Regular)   Pulse 120   Temp 99.5  F (37.5  C) (Oral)   Resp 22   Ht 1.588 m (5' 2.5\")   Wt 67.8 kg (149 lb 8 oz)   LMP 04/02/2024 (Exact Date)   SpO2 99%   BMI 26.91 kg/m    Body mass index is 26.91 kg/m .  Physical Exam   GENERAL: alert and no distress  RESP: lungs clear to auscultation - no rales, rhonchi or wheezes  CV: regular rate and rhythm, normal S1 S2, no S3 or S4, no murmur, click or rub, no peripheral edema  MS: no gross musculoskeletal defects noted, no edema, normal ROM of bilateral upper extremities      Signed Electronically by: Mary Wilcox PA-C    "

## 2024-04-29 NOTE — PATIENT INSTRUCTIONS
When doing taper of the fluoxetine I want you to take the evening dose of the cymbalta for the 4 days you are on the 20 mg of fluoxetine. When you drop to 10 mg of fluoxetine you can take the normal dosage of the Cymbalta as prescribed (30 mg twice daily).

## 2024-04-30 LAB
AMYLASE SERPL-CCNC: 46 U/L (ref 28–100)
CRP SERPL-MCNC: <3 MG/L
LIPASE SERPL-CCNC: 37 U/L (ref 13–60)
RHEUMATOID FACT SERPL-ACNC: <10 IU/ML

## 2024-05-01 LAB — ANA SER QL IF: NEGATIVE

## 2024-05-04 LAB — CCP AB SER IA-ACNC: 0.8 U/ML

## 2024-09-16 ENCOUNTER — TELEPHONE (OUTPATIENT)
Dept: FAMILY MEDICINE | Facility: CLINIC | Age: 39
End: 2024-09-16

## 2024-09-16 ENCOUNTER — ANCILLARY PROCEDURE (OUTPATIENT)
Dept: GENERAL RADIOLOGY | Facility: CLINIC | Age: 39
End: 2024-09-16
Attending: FAMILY MEDICINE
Payer: COMMERCIAL

## 2024-09-16 ENCOUNTER — OFFICE VISIT (OUTPATIENT)
Dept: FAMILY MEDICINE | Facility: CLINIC | Age: 39
End: 2024-09-16
Payer: COMMERCIAL

## 2024-09-16 VITALS
OXYGEN SATURATION: 99 % | WEIGHT: 171.3 LBS | RESPIRATION RATE: 16 BRPM | BODY MASS INDEX: 30.35 KG/M2 | HEIGHT: 63 IN | TEMPERATURE: 99.3 F | HEART RATE: 103 BPM | SYSTOLIC BLOOD PRESSURE: 100 MMHG | DIASTOLIC BLOOD PRESSURE: 64 MMHG

## 2024-09-16 DIAGNOSIS — M79.672 LEFT FOOT PAIN: ICD-10-CM

## 2024-09-16 DIAGNOSIS — M79.672 LEFT FOOT PAIN: Primary | ICD-10-CM

## 2024-09-16 PROCEDURE — 90677 PCV20 VACCINE IM: CPT | Performed by: FAMILY MEDICINE

## 2024-09-16 PROCEDURE — 90656 IIV3 VACC NO PRSV 0.5 ML IM: CPT | Performed by: FAMILY MEDICINE

## 2024-09-16 PROCEDURE — 90472 IMMUNIZATION ADMIN EACH ADD: CPT | Performed by: FAMILY MEDICINE

## 2024-09-16 PROCEDURE — 73630 X-RAY EXAM OF FOOT: CPT | Mod: TC | Performed by: INTERNAL MEDICINE

## 2024-09-16 PROCEDURE — 90471 IMMUNIZATION ADMIN: CPT | Performed by: FAMILY MEDICINE

## 2024-09-16 PROCEDURE — 99213 OFFICE O/P EST LOW 20 MIN: CPT | Mod: 25 | Performed by: FAMILY MEDICINE

## 2024-09-16 RX ORDER — NAPROXEN SODIUM 220 MG
440 TABLET ORAL 2 TIMES DAILY WITH MEALS
COMMUNITY
Start: 2024-09-16 | End: 2024-09-23

## 2024-09-16 RX ORDER — PREDNISONE 20 MG/1
TABLET ORAL
Qty: 20 TABLET | Refills: 0 | Status: SHIPPED | OUTPATIENT
Start: 2024-09-16 | End: 2024-09-17

## 2024-09-16 NOTE — PROGRESS NOTES
"  Assessment & Plan   Left foot pain  Persistent pain in her left forefoot that is most consistent with Parrish's neuroma versus metatarsalgia, gout is a possibility although no prior history of self.  Already taking ibuprofen 800 and will try prednisone.  Once she is done with the prednisone to try naproxen 40 twice daily as needed, Tylenol okay to take with this.  Metatarsal pad such as Dr. Treviño's custom fit would be helpful and wearing supportive shoes as well.  - XR Foot Left G/E 3 Views  - naproxen sodium (ALEVE) 220 MG tablet  - predniSONE (DELTASONE) 20 MG tablet  Dispense: 20 tablet; Refill: 0        BMI  Estimated body mass index is 30.34 kg/m  as calculated from the following:    Height as of this encounter: 1.6 m (5' 3\").    Weight as of this encounter: 77.7 kg (171 lb 4.8 oz).   Weight management plan: Discussed healthy diet and exercise guidelines      Return in about 2 weeks (around 9/30/2024) for symptoms failing to improve or sooner if worsening.          Jeremy Merritt MD      [address] [address]  [email or web address]   Office: [phone]         Shayne Sal is a 39 year old, presenting for the following health issues:  Foot Injury    History of Present Illness       Reason for visit:  Left foot injury  Symptom onset:  3-7 days ago  Symptoms include:  Pain contunios and moderate  Symptom intensity:  Moderate  Symptom progression:  Worsening  Had these symptoms before:  No  What makes it worse:  Walking and stairs  What makes it better:  No   She is taking medications regularly.     Taking 800mg ibuprofen for pain in foot for     There has been swelling on the top/forefoot of the foot and into her toes.  She has tried icing, elevating.  Pain has been waking her up at night and shoots into achilles tendon and up left side of leg    No history of gout, no trauma        Objective    /64   Pulse 103   Temp 99.3  F (37.4  C) " "(Tympanic)   Resp 16   Ht 1.6 m (5' 3\")   Wt 77.7 kg (171 lb 4.8 oz)   LMP 08/16/2024   SpO2 99%   BMI 30.34 kg/m    Body mass index is 30.34 kg/m .  Physical Exam   GENERAL: alert and no distress  NECK: no adenopathy, no asymmetry, masses, or scars  CV: regular rate and rhythm, normal S1 S2, no S3 or S4, no murmur, click or rub, no peripheral edema  MS: Left foot has tenderness across the 2nd through 4th metatarsals in particular.  No crepitus, mild swelling otherwise no gross musculoskeletal defects noted, no edema  SKIN: no suspicious lesions or rashes  NEURO: Normal strength and tone, mentation intact and speech normal        X-ray of foot does not show any fractures and some soft tissue swelling noted.    Signed Electronically by: Satya Merritt MD    "

## 2024-09-16 NOTE — TELEPHONE ENCOUNTER
Medication Question or Refill    Contacts       Contact Date/Time Type Contact Phone/Fax    09/16/2024 02:55 PM CDT In Person (Incoming) Jonelle Khan (Self)             What medication are you calling about (include dose and sig)?: naproxen sodium (ALEVE) 220 MG tablet  and predniSONE (DELTASONE) 20 MG tablet     Preferred Pharmacy:   [place] [facility] [id number] [facility] [address]  Phone: [phone] Fax: [phone]        Controlled Substance Agreement on file:   CSA -- Patient Level:    CSA: None found at the patient level.       Who prescribed the medication?: Dr. Merritt    Do you need a refill? Yes    When did you use the medication last? Never    Patient offered an appointment? No    Do you have any questions or concerns?  Yes: This was sent to the [facility] pharmacy in [place] but Jonelle needs it sent to [facility] instead.      Could we send this information to you in [facility] or would you prefer to receive a phone call?:   Patient would prefer a phone call   Okay to leave a detailed message?: Yes at Home number on file [phone] (home)

## 2024-09-17 RX ORDER — PREDNISONE 20 MG/1
TABLET ORAL
Qty: 20 TABLET | Refills: 0 | Status: SHIPPED | OUTPATIENT
Start: 2024-09-17 | End: 2024-09-29

## 2024-09-17 NOTE — TELEPHONE ENCOUNTER
Sent Prednisone to [facility] per Pt request. Naproxen is OTC.    [place] RN [facility]

## 2024-09-17 NOTE — TELEPHONE ENCOUNTER
Please call pharmacy to switch scripts     Thank you     Tawana Foley RN, BSN  [facility]

## 2025-01-14 ENCOUNTER — LAB REQUISITION (OUTPATIENT)
Dept: LAB | Facility: CLINIC | Age: 40
End: 2025-01-14

## 2025-01-14 ENCOUNTER — TELEPHONE (OUTPATIENT)
Dept: FAMILY MEDICINE | Facility: CLINIC | Age: 40
End: 2025-01-14
Payer: MEDICAID

## 2025-01-14 ENCOUNTER — LAB REQUISITION (OUTPATIENT)
Dept: LAB | Facility: CLINIC | Age: 40
End: 2025-01-14
Payer: MEDICAID

## 2025-01-14 DIAGNOSIS — Z12.4 ENCOUNTER FOR SCREENING FOR MALIGNANT NEOPLASM OF CERVIX: ICD-10-CM

## 2025-01-14 DIAGNOSIS — R53.83 OTHER FATIGUE: ICD-10-CM

## 2025-01-14 DIAGNOSIS — Z13.1 ENCOUNTER FOR SCREENING FOR DIABETES MELLITUS: ICD-10-CM

## 2025-01-14 LAB
ALBUMIN SERPL BCG-MCNC: 4.3 G/DL (ref 3.5–5.2)
ALP SERPL-CCNC: 53 U/L (ref 40–150)
ALT SERPL W P-5'-P-CCNC: 28 U/L (ref 0–50)
ANION GAP SERPL CALCULATED.3IONS-SCNC: 10 MMOL/L (ref 7–15)
AST SERPL W P-5'-P-CCNC: 26 U/L (ref 0–45)
BILIRUB SERPL-MCNC: 0.2 MG/DL
BUN SERPL-MCNC: 9.7 MG/DL (ref 6–20)
CALCIUM SERPL-MCNC: 9.6 MG/DL (ref 8.8–10.4)
CHLORIDE SERPL-SCNC: 106 MMOL/L (ref 98–107)
CREAT SERPL-MCNC: 0.74 MG/DL (ref 0.51–0.95)
EGFRCR SERPLBLD CKD-EPI 2021: >90 ML/MIN/1.73M2
ERYTHROCYTE [DISTWIDTH] IN BLOOD BY AUTOMATED COUNT: 12.7 % (ref 10–15)
EST. AVERAGE GLUCOSE BLD GHB EST-MCNC: 105 MG/DL
FERRITIN SERPL-MCNC: 18 NG/ML (ref 6–175)
GLUCOSE SERPL-MCNC: 94 MG/DL (ref 70–99)
HBA1C MFR BLD: 5.3 %
HCO3 SERPL-SCNC: 25 MMOL/L (ref 22–29)
HCT VFR BLD AUTO: 42.4 % (ref 35–47)
HGB BLD-MCNC: 14 G/DL (ref 11.7–15.7)
MCH RBC QN AUTO: 30.1 PG (ref 26.5–33)
MCHC RBC AUTO-ENTMCNC: 33 G/DL (ref 31.5–36.5)
MCV RBC AUTO: 91 FL (ref 78–100)
PLATELET # BLD AUTO: 221 10E3/UL (ref 150–450)
POTASSIUM SERPL-SCNC: 4.7 MMOL/L (ref 3.4–5.3)
PROT SERPL-MCNC: 7.2 G/DL (ref 6.4–8.3)
RBC # BLD AUTO: 4.65 10E6/UL (ref 3.8–5.2)
SODIUM SERPL-SCNC: 141 MMOL/L (ref 135–145)
T4 FREE SERPL-MCNC: 0.66 NG/DL (ref 0.9–1.7)
TSH SERPL DL<=0.005 MIU/L-ACNC: 5.88 UIU/ML (ref 0.3–4.2)
VIT D+METAB SERPL-MCNC: 30 NG/ML (ref 20–50)
WBC # BLD AUTO: 7.5 10E3/UL (ref 4–11)

## 2025-01-14 PROCEDURE — 84439 ASSAY OF FREE THYROXINE: CPT | Performed by: ADVANCED PRACTICE MIDWIFE

## 2025-01-14 PROCEDURE — 84443 ASSAY THYROID STIM HORMONE: CPT | Performed by: ADVANCED PRACTICE MIDWIFE

## 2025-01-14 PROCEDURE — 80053 COMPREHEN METABOLIC PANEL: CPT | Performed by: ADVANCED PRACTICE MIDWIFE

## 2025-01-14 PROCEDURE — 87624 HPV HI-RISK TYP POOLED RSLT: CPT | Performed by: ADVANCED PRACTICE MIDWIFE

## 2025-01-14 PROCEDURE — 82306 VITAMIN D 25 HYDROXY: CPT | Performed by: ADVANCED PRACTICE MIDWIFE

## 2025-01-14 PROCEDURE — 84155 ASSAY OF PROTEIN SERUM: CPT | Performed by: ADVANCED PRACTICE MIDWIFE

## 2025-01-14 PROCEDURE — 85014 HEMATOCRIT: CPT | Performed by: ADVANCED PRACTICE MIDWIFE

## 2025-01-14 PROCEDURE — 83036 HEMOGLOBIN GLYCOSYLATED A1C: CPT | Performed by: ADVANCED PRACTICE MIDWIFE

## 2025-01-14 PROCEDURE — 82728 ASSAY OF FERRITIN: CPT | Performed by: ADVANCED PRACTICE MIDWIFE

## 2025-01-14 PROCEDURE — G0145 SCR C/V CYTO,THINLAYER,RESCR: HCPCS | Performed by: ADVANCED PRACTICE MIDWIFE

## 2025-01-14 PROCEDURE — 84132 ASSAY OF SERUM POTASSIUM: CPT | Performed by: ADVANCED PRACTICE MIDWIFE

## 2025-01-14 NOTE — TELEPHONE ENCOUNTER
Needs of attention regarding:  -multiple concerns    Health Maintenance Topics with due status: Overdue       Topic Date Due    ASTHMA ACTION PLAN 01/13/2023    ADVANCE CARE PLANNING 12/20/2023    NICOTINE/TOBACCO CESSATION COUNSELING Q 1 YR 08/31/2024    YEARLY PREVENTIVE VISIT 08/31/2024    COVID-19 Vaccine 09/01/2024    ASTHMA CONTROL TEST 10/29/2024    PHQ-2 (once per calendar year) 01/01/2025     Health Maintenance Topics with due status: Due Soon       Topic Date Due    ANNUAL REVIEW OF HM ORDERS 04/29/2025    HPV TEST 07/01/2025    PAP 07/01/2025       Communication:  See MyChart message

## 2025-01-15 LAB
HPV HR 12 DNA CVX QL NAA+PROBE: NEGATIVE
HPV16 DNA CVX QL NAA+PROBE: NEGATIVE
HPV18 DNA CVX QL NAA+PROBE: NEGATIVE
HUMAN PAPILLOMA VIRUS FINAL DIAGNOSIS: NORMAL

## 2025-01-17 LAB
BKR AP ASSOCIATED HPV REPORT: NORMAL
BKR LAB AP GYN ADEQUACY: NORMAL
BKR LAB AP GYN INTERPRETATION: NORMAL
BKR LAB AP LMP: NORMAL
BKR LAB AP PREVIOUS ABNL DX: NORMAL
BKR LAB AP PREVIOUS ABNORMAL: NORMAL
PATH REPORT.COMMENTS IMP SPEC: NORMAL
PATH REPORT.COMMENTS IMP SPEC: NORMAL
PATH REPORT.RELEVANT HX SPEC: NORMAL

## 2025-01-28 ENCOUNTER — TRANSFERRED RECORDS (OUTPATIENT)
Dept: HEALTH INFORMATION MANAGEMENT | Facility: CLINIC | Age: 40
End: 2025-01-28

## 2025-01-29 ENCOUNTER — TRANSFERRED RECORDS (OUTPATIENT)
Dept: HEALTH INFORMATION MANAGEMENT | Facility: CLINIC | Age: 40
End: 2025-01-29
Payer: MEDICAID

## 2025-06-02 DIAGNOSIS — Z72.0 TOBACCO ABUSE: ICD-10-CM

## 2025-06-02 NOTE — TELEPHONE ENCOUNTER
Patient is requesting refills on the nicotine 7mg patches.    If approved, please send a new prescription for these patches.    Thanks,  Kristal Curiel Deirdre  [facility]  [phone]

## (undated) DEVICE — GLOVE PROTEXIS W/NEU-THERA 6.5  2D73TE65

## (undated) DEVICE — LINEN HALF SHEET 5512

## (undated) DEVICE — DRSG STERI STRIP 1/2X4" R1547

## (undated) DEVICE — TRANSFER DEVICE BLOOD NDL HOLDER 364880

## (undated) DEVICE — SU VICRYL 2-0 CT-1 36" J345H

## (undated) DEVICE — SU VICRYL 0 CT-1 36" J346H

## (undated) DEVICE — SOL NACL 0.9% IRRIG 1000ML BOTTLE 2F7124

## (undated) DEVICE — LINEN BABY BLANKET 5434

## (undated) DEVICE — SU MONOCRYL 1 CT-1 36" Y947H

## (undated) DEVICE — SUCTION CANISTER MEDIVAC LINER 3000ML W/LID 65651-530

## (undated) DEVICE — BAG CLEAR TRASH 1.3M 39X33" P4040C

## (undated) DEVICE — ESU GROUND PAD ADULT W/CORD E7507

## (undated) DEVICE — SU CHROMIC 1 54" TIE S115H

## (undated) DEVICE — STOCKING SLEEVE VASOPRESS COMPRESSION CALF MED 18" VP501M

## (undated) DEVICE — PREP POVIDONE IODINE SOLUTION 10% 4OZ

## (undated) DEVICE — BLADE CLIPPER SGL USE 9680

## (undated) DEVICE — CAP BABY PINK/BLUE IC-2

## (undated) DEVICE — PREP CHLORAPREP 26ML TINTED ORANGE  260815

## (undated) DEVICE — PREP SKIN SCRUB TRAY 4461A

## (undated) DEVICE — LINEN DRAPE 54X72" 5467

## (undated) DEVICE — LINEN FULL SHEET 5511

## (undated) DEVICE — SU PDS II 0 CTX 60" Z990G

## (undated) DEVICE — SOLIDIFIER FLUID RED 1500ML LIQUID KIT SYS POWDER ISOB1500

## (undated) DEVICE — SOL ADH LIQUID BENZOIN SWAB 0.6ML C1544

## (undated) DEVICE — GLOVE PROTEXIS POWDER FREE 7.5 ORTHOPEDIC 2D73ET75

## (undated) DEVICE — CATH TRAY FOLEY 16FR SILICONE 907416

## (undated) DEVICE — GLOVE PROTEXIS MICRO 7.5  2D73PM75

## (undated) DEVICE — SU MONOCRYL 4-0 PS-2 27" UND Y426H

## (undated) DEVICE — ESU LIGASURE OPEN SEALER/DIVIDER SM JAW 16.5MM LF1212A

## (undated) DEVICE — SU MONOCRYL 0 CTX 36" Y398H

## (undated) DEVICE — SU PLAIN 3-0 CT 27" 852H

## (undated) DEVICE — GLOVE PROTEXIS BLUE W/NEU-THERA 6.0  2D73EB60

## (undated) DEVICE — Device

## (undated) DEVICE — SOL WATER IRRIG 1000ML BOTTLE 2F7114

## (undated) DEVICE — LINEN TOWEL PACK X10 5473

## (undated) DEVICE — STOCKING SLEEVE VASOPRESS COMPRESSION CALF MED VP501M

## (undated) DEVICE — SU MONOCRYL 3-0 SH 27" Y316H

## (undated) DEVICE — GLOVE PROTEXIS W/NEU-THERA 6.0  2D73TE60

## (undated) DEVICE — ESU PENCIL W/SMOKE EVAC CVPLP2000

## (undated) DEVICE — SU PLAIN 3-0 SH 27" G322H

## (undated) DEVICE — PACK C-SECTION LF PL15OTA83B

## (undated) DEVICE — GLOVE PROTEXIS BLUE W/NEU-THERA 6.5  2D73EB65

## (undated) DEVICE — PAD CHUX UNDERPAD 30X36" P3036C

## (undated) DEVICE — GLOVE PROTEXIS BLUE W/NEU-THERA 7.0  2D73EB70

## (undated) DEVICE — SU MONOCRYL 2-0 CT-1 36" UND Y945H

## (undated) DEVICE — STPL SKIN 35W APPOSE 8886803712

## (undated) DEVICE — ADH SKIN CLOSURE PREMIERPRO EXOFIN 1.0ML 3470

## (undated) DEVICE — GLOVE PROTEXIS MICRO 6.5  2D73PM65

## (undated) DEVICE — SU VICRYL 3-0 CT-1 36" J344H

## (undated) DEVICE — CATH TRAY FOLEY SURESTEP 16FR DRAIN BAG STATOCK A899916

## (undated) RX ORDER — BUPIVACAINE HYDROCHLORIDE 5 MG/ML
INJECTION, SOLUTION EPIDURAL; INTRACAUDAL
Status: DISPENSED
Start: 2018-12-20

## (undated) RX ORDER — OXYTOCIN/0.9 % SODIUM CHLORIDE 30/500 ML
PLASTIC BAG, INJECTION (ML) INTRAVENOUS
Status: DISPENSED
Start: 2018-12-20

## (undated) RX ORDER — MORPHINE SULFATE 1 MG/ML
INJECTION, SOLUTION EPIDURAL; INTRATHECAL; INTRAVENOUS
Status: DISPENSED
Start: 2018-12-20

## (undated) RX ORDER — BUPIVACAINE HYDROCHLORIDE 7.5 MG/ML
INJECTION, SOLUTION INTRASPINAL
Status: DISPENSED
Start: 2018-12-20

## (undated) RX ORDER — EPHEDRINE SULFATE 50 MG/ML
INJECTION, SOLUTION INTRAMUSCULAR; INTRAVENOUS; SUBCUTANEOUS
Status: DISPENSED
Start: 2018-12-20

## (undated) RX ORDER — OXYTOCIN/0.9 % SODIUM CHLORIDE 30/500 ML
PLASTIC BAG, INJECTION (ML) INTRAVENOUS
Status: DISPENSED
Start: 2020-08-14

## (undated) RX ORDER — FENTANYL CITRATE 50 UG/ML
INJECTION, SOLUTION INTRAMUSCULAR; INTRAVENOUS
Status: DISPENSED
Start: 2020-08-14

## (undated) RX ORDER — ONDANSETRON 2 MG/ML
INJECTION INTRAMUSCULAR; INTRAVENOUS
Status: DISPENSED
Start: 2018-12-20

## (undated) RX ORDER — PHENYLEPHRINE HCL IN 0.9% NACL 1 MG/10 ML
SYRINGE (ML) INTRAVENOUS
Status: DISPENSED
Start: 2018-12-20

## (undated) RX ORDER — MORPHINE SULFATE 1 MG/ML
INJECTION, SOLUTION EPIDURAL; INTRATHECAL; INTRAVENOUS
Status: DISPENSED
Start: 2020-08-14